# Patient Record
Sex: MALE | Race: WHITE | NOT HISPANIC OR LATINO | Employment: FULL TIME | URBAN - METROPOLITAN AREA
[De-identification: names, ages, dates, MRNs, and addresses within clinical notes are randomized per-mention and may not be internally consistent; named-entity substitution may affect disease eponyms.]

---

## 2017-03-06 DIAGNOSIS — F33.1 MODERATE EPISODE OF RECURRENT MAJOR DEPRESSIVE DISORDER (H): ICD-10-CM

## 2017-03-06 NOTE — TELEPHONE ENCOUNTER
Medication Detail      Disp Refills Start End CRISTELA   buPROPion (WELLBUTRIN SR) 150 MG 12 hr tablet 60 tablet 3 9/29/2016  --   Sig: Take 1 tablet once daily and increase to 1 tablet twice daily after 4 to 7 days   Class: Fax     Last Office Visit with [facility], [facility] or [facility] prescribing provider:  12/22/2016   Next 5 appointments (look out 90 days)     Mar 23, 2017  6:00 PM CDT   SHORT with Clarence Park MD   [facility]    [address] [place] [id number] [phone]                   Last PHQ-9 score on record=   PHQ-9 SCORE 12/22/2016   Total Score 3       No results found for: AST  Lab Results   Component Value Date    ALT 35 05/23/2011

## 2017-03-07 RX ORDER — BUPROPION HYDROCHLORIDE 150 MG/1
TABLET, EXTENDED RELEASE ORAL
Qty: 60 TABLET | Refills: 0 | Status: SHIPPED | OUTPATIENT
Start: 2017-03-07 | End: 2017-03-23

## 2017-03-23 ENCOUNTER — OFFICE VISIT (OUTPATIENT)
Dept: FAMILY MEDICINE | Facility: CLINIC | Age: 47
End: 2017-03-23

## 2017-03-23 VITALS
HEIGHT: 70 IN | SYSTOLIC BLOOD PRESSURE: 122 MMHG | DIASTOLIC BLOOD PRESSURE: 70 MMHG | HEART RATE: 72 BPM | TEMPERATURE: 97.9 F

## 2017-03-23 DIAGNOSIS — G89.21 CHRONIC PAIN DUE TO TRAUMA: ICD-10-CM

## 2017-03-23 DIAGNOSIS — M54.9 BACK PAIN WITH RADIATION: ICD-10-CM

## 2017-03-23 DIAGNOSIS — F33.1 MODERATE EPISODE OF RECURRENT MAJOR DEPRESSIVE DISORDER (H): ICD-10-CM

## 2017-03-23 DIAGNOSIS — M12.579 TRAUMATIC ARTHRITIS OF ANKLE, UNSPECIFIED LATERALITY: ICD-10-CM

## 2017-03-23 DIAGNOSIS — I10 ESSENTIAL HYPERTENSION WITH GOAL BLOOD PRESSURE LESS THAN 140/90: ICD-10-CM

## 2017-03-23 DIAGNOSIS — K21.00 GASTROESOPHAGEAL REFLUX DISEASE WITH ESOPHAGITIS: ICD-10-CM

## 2017-03-23 PROCEDURE — 99213 OFFICE O/P EST LOW 20 MIN: CPT | Performed by: FAMILY MEDICINE

## 2017-03-23 RX ORDER — HYDROCODONE BITARTRATE AND ACETAMINOPHEN 5; 325 MG/1; MG/1
1 TABLET ORAL EVERY 6 HOURS PRN
Qty: 60 TABLET | Refills: 0 | Status: SHIPPED | OUTPATIENT
Start: 2017-05-24 | End: 2017-07-06

## 2017-03-23 RX ORDER — METOPROLOL SUCCINATE 50 MG/1
50 TABLET, EXTENDED RELEASE ORAL DAILY
Qty: 30 TABLET | Refills: 12 | Status: SHIPPED | OUTPATIENT
Start: 2017-03-23 | End: 2018-03-08

## 2017-03-23 RX ORDER — HYDROCODONE BITARTRATE AND ACETAMINOPHEN 5; 325 MG/1; MG/1
1 TABLET ORAL EVERY 6 HOURS PRN
Qty: 60 TABLET | Refills: 0 | Status: SHIPPED | OUTPATIENT
Start: 2017-03-23 | End: 2017-07-06

## 2017-03-23 RX ORDER — BUPROPION HYDROCHLORIDE 150 MG/1
150 TABLET, EXTENDED RELEASE ORAL 2 TIMES DAILY
Qty: 180 TABLET | Refills: 3 | Status: CANCELLED | OUTPATIENT
Start: 2017-03-23

## 2017-03-23 RX ORDER — HYDROCODONE BITARTRATE AND ACETAMINOPHEN 5; 325 MG/1; MG/1
1 TABLET ORAL EVERY 6 HOURS PRN
Qty: 60 TABLET | Refills: 0 | Status: SHIPPED | OUTPATIENT
Start: 2017-04-24 | End: 2017-07-06

## 2017-03-23 NOTE — PATIENT INSTRUCTIONS
-Try taking extra strength tylenol two tabs three times a day. You can take up to 3000 mg a day    -Follow up in three months

## 2017-03-23 NOTE — PROGRESS NOTES
"  SUBJECTIVE:                                                    Rakesh Carey is a 47 year old male who presents to clinic today for the following health issues:    Depression. Patient states that he has started decreasing dose of Wellbutrin. He had started taking Wellbutrin after a \"meltdown\" secondary to several situational stressors. He confirms that he's completely over that period of aguish.     Past/recent records reviewed and discussed for -- medications.      Chronic Pain Follow-Up       Analgesia/pain control:       Recent changes:  same      Overall control: Comfortably manageable  Activity level/function:      Daily activities:  Able to do all daily activities    Work:  Patient is working full time  Adverse effects:  No  Adherance    Taking medication as directed?  Yes    Participating in other treatments: yes  Risk Factors:    Sleep:  Good    Mood/anxiety:  controlled    Recent family or social stressors:  none noted    Other aggravating factors: none  PHQ-9 SCORE 12/17/2015 9/29/2016 12/22/2016   Total Score 1 15 3     THOMAS-7 SCORE 12/17/2015 12/22/2016   Total Score 3 3     Encounter-Level CSA - 03/24/2016:                 Controlled Substance Agreement - Scan on 4/14/2016  5:08 PM : CONTROLLED SUBSTANCE AGREEMENT, 03/24/16 (below)               Controlled Substance Agreement - Scan on 3/25/2016 11:19 AM : CONTROLLED SUBSTANCE AGREEMENT (below)                       Problem list and histories reviewed & adjusted, as indicated.  Additional history: as documented    Labs reviewed in EPIC    Reviewed and updated as needed this visit by clinical staff       Reviewed and updated as needed this visit by Provider         ROS:  Constitutional, HEENT, cardiovascular, pulmonary, GI, , musculoskeletal, neuro, skin, endocrine and psych systems are negative, except as otherwise noted.    This document serves as a record of the services and decisions personally performed and made by Chao Park MD. It was " "created on their behalf by Mauricio Brownlee, a trained medical scribe. The creation of this document is based the provider's statements to the medical scribe.  Mauricio Brownlee March 23, 2017 5:32 PM         OBJECTIVE:                                                    /70 (BP Location: Right arm, Cuff Size: Adult Large)  Pulse 72  Temp 97.9  F (36.6  C) (Oral)  Ht 1.778 m (5' 10\")  There is no height or weight on file to calculate BMI.  GENERAL: healthy, alert and no distress  HENT: ear canals and TM's normal, nose and mouth without ulcers or lesions  RESP: lungs clear to auscultation - no rales, rhonchi or wheezes  CV: regular rate and rhythm, normal S1 S2, no S3 or S4, no murmur, click or rub, no peripheral edema   MS: no gross musculoskeletal defects noted, no edema -- severe arthritis of right ankle  SKIN: no suspicious lesions or rashes  PSYCH: mentation appears normal, affect normal/bright    Diagnostic Test Results:  none      ASSESSMENT/PLAN:                                                    (M12.579) Traumatic arthritis of ankle, right  Comment: stable  Plan: HYDROcodone-acetaminophen (NORCO) 5-325 MG per         tablet, HYDROcodone-acetaminophen (NORCO) 5-325        MG per tablet        Medications refilled, continue present medications    (G89.21) Chronic pain due to trauma  Comment: stable  Plan: HYDROcodone-acetaminophen (NORCO) 5-325 MG per         tablet, HYDROcodone-acetaminophen (NORCO) 5-325        MG per tablet, HYDROcodone-acetaminophen         (NORCO) 5-325 MG per tablet        Continue present medications    (M54.9) Back pain with radiation  Comment: Stable  Plan: HYDROcodone-acetaminophen (NORCO) 5-325 MG per         tablet, HYDROcodone-acetaminophen (NORCO) 5-325        MG per tablet        Continue present medications    (I10) Essential hypertension with goal blood pressure less than 140/90  Comment: controlled  Plan: metoprolol (TOPROL-XL) 50 MG 24 hr tablet        Continue present " medications, medications refilled    (F33.1) Moderate episode of recurrent major depressive disorder (H)  Comment: stable.   Plan: Follow up as needed    (M12.579) Traumatic arthritis of ankle, unspecified laterality  Comment: stable  Plan: HYDROcodone-acetaminophen (NORCO) 5-325 MG per         tablet        Continue present medications, medications refilled    (K21.0) Gastroesophageal reflux disease with esophagitis  Comment: stable  Plan: omeprazole (PRILOSEC) 20 MG CR capsule        Continue present medications, medications refilled    Patient Instructions   -Try taking extra strength tylenol two tabs three times a day. You can take up to 3000 mg a day    -Follow up in three months      Clarence Park MD, MD  [facility]

## 2017-03-23 NOTE — MR AVS SNAPSHOT
After Visit Summary   3/23/2017    Rakesh Carey    MRN: [id number]           Patient Information     Date Of Birth          1970        Visit Information        Provider Department      3/23/2017 6:00 PM Clarence Park MD [facility]        Today's Diagnoses     Traumatic arthritis of ankle, right        Chronic pain due to trauma        Back pain with radiation        Essential hypertension with goal blood pressure less than 140/90        Moderate episode of recurrent major depressive disorder (H)        Traumatic arthritis of ankle, unspecified laterality        Gastroesophageal reflux disease with esophagitis          Care Instructions    -Try taking extra strength tylenol two tabs three times a day. You can take up to 3000 mg a day    -Follow up in three months        Follow-ups after your visit        Your next 10 appointments already scheduled     Mar 23, 2017  6:00 PM CDT   SHORT with Clarence Park MD   [facility]    [address] [place] [id number] [phone]              Who to contact     If you have questions or need follow up information about today's clinic visit or your schedule please contact [facility] directly at [phone].  Normal or non-critical lab and imaging results will be communicated to you by MyChart, letter or phone within 4 business days after the clinic has received the results. If you do not hear from us within 7 days, please contact the clinic through [facility] or phone. If you have a critical or abnormal lab result, we will notify you by phone as soon as possible.  Submit refill requests through [facility] or call your pharmacy and they will forward the refill request to us. Please allow 3 business days for your refill to be completed.          Additional Information About Your Visit        MyChart Information     [facility] lets you send  "messages to your doctor, view your test results, renew your prescriptions, schedule appointments and more. To sign up, go to [email or web address] . Click on \"Log in\" on the left side of the screen, which will take you to the Welcome page. Then click on \"Sign up Now\" on the right side of the page.     You will be asked to enter the access code listed below, as well as some personal information. Please follow the directions to create your username and password.     Your access code is: [email or web address]  Expires: 2017  5:41 PM     Your access code will  in 90 days. If you need help or a new code, please call your [facility] clinic or [phone].        Care EveryWhere ID     This is your Care EveryWhere ID. This could be used by other organizations to access your [facility] medical records  [id number]        Your Vitals Were     Pulse Temperature Height             72 97.9  F (36.6  C) (Oral) 5' 10\" (1.778 m)          Blood Pressure from Last 3 Encounters:   17 122/70   16 122/84   16 126/74    Weight from Last 3 Encounters:   16 275 lb (124.7 kg)   16 278 lb (126.1 kg)   16 285 lb (129.3 kg)              Today, you had the following     No orders found for display         Today's Medication Changes          These changes are accurate as of: 3/23/17  5:41 PM.  If you have any questions, ask your nurse or doctor.               These medicines have changed or have updated prescriptions.        Dose/Directions    * HYDROcodone-acetaminophen 5-325 MG per tablet   Commonly known as:  NORCO   This may have changed:  You were already taking a medication with the same name, and this prescription was added. Make sure you understand how and when to take each.   Used for:  Back pain with radiation, Traumatic arthritis of ankle, unspecified laterality, Chronic pain due to trauma   Changed by:  Clarence Park MD        Dose:  1 tablet   Take 1 tablet by mouth every 6 hours as " needed for pain   Quantity:  60 tablet   Refills:  0       * HYDROcodone-acetaminophen 5-325 MG per tablet   Commonly known as:  NORCO   This may have changed:  Another medication with the same name was added. Make sure you understand how and when to take each.   Used for:  Back pain with radiation, Traumatic arthritis of ankle, unspecified laterality, Chronic pain due to trauma   Changed by:  Clarence Park MD        Dose:  1 tablet   Start taking on:  4/24/2017   Take 1 tablet by mouth every 6 hours as needed for pain   Quantity:  60 tablet   Refills:  0       * HYDROcodone-acetaminophen 5-325 MG per tablet   Commonly known as:  NORCO   This may have changed:  Another medication with the same name was added. Make sure you understand how and when to take each.   Used for:  Traumatic arthritis of ankle, unspecified laterality, Chronic pain due to trauma   Changed by:  Clarence Park MD        Dose:  1 tablet   Start taking on:  5/24/2017   Take 1 tablet by mouth every 6 hours as needed for pain   Quantity:  60 tablet   Refills:  0       * Notice:  This list has 3 medication(s) that are the same as other medications prescribed for you. Read the directions carefully, and ask your doctor or other care provider to review them with you.         Where to get your medicines      These medications were sent to [facility]pharmacy [id number]  [facility] [place] [facility] [address] [address]     Phone:  [phone]     metoprolol 50 MG 24 hr tablet    omeprazole 20 MG CR capsule         Some of these will need a paper prescription and others can be bought over the counter.  Ask your nurse if you have questions.     Bring a paper prescription for each of these medications     HYDROcodone-acetaminophen 5-325 MG per tablet    HYDROcodone-acetaminophen 5-325 MG per tablet    HYDROcodone-acetaminophen 5-325 MG per tablet                Primary Care Provider Office Phone # Fax #    Clarence  Tawanda Park [phone]       [facility] [address] [facility] [address]        Thank you!     Thank you for choosing [facility]  for your care. Our goal is always to provide you with excellent care. Hearing back from our patients is one way we can continue to improve our services. Please take a few minutes to complete the written survey that you may receive in the mail after your visit with us. Thank you!             Your Updated Medication List - Protect others around you: Learn how to safely use, store and throw away your medicines at www.disposemymeds.org.          This list is accurate as of: 3/23/17  5:41 PM.  Always use your most recent med list.                   Brand Name Dispense Instructions for use    cyclobenzaprine 10 MG tablet    FLEXERIL    90 tablet    Take 1 tablet (10 mg) by mouth 3 times daily as needed for muscle spasms       * HYDROcodone-acetaminophen 5-325 MG per tablet    NORCO    60 tablet    Take 1 tablet by mouth every 6 hours as needed for pain       * HYDROcodone-acetaminophen 5-325 MG per tablet   Start taking on:  4/24/2017    NORCO    60 tablet    Take 1 tablet by mouth every 6 hours as needed for pain       * HYDROcodone-acetaminophen 5-325 MG per tablet   Start taking on:  5/24/2017    NORCO    60 tablet    Take 1 tablet by mouth every 6 hours as needed for pain       ibuprofen 800 MG tablet    ADVIL/MOTRIN    100 tablet    Take 1 tablet by mouth 3 times daily as needed for pain       metoprolol 50 MG 24 hr tablet    TOPROL-XL    30 tablet    Take 1 tablet (50 mg) by mouth daily       omeprazole 20 MG CR capsule    priLOSEC    60 capsule    Take 1 capsule (20 mg) by mouth 2 times daily       * Notice:  This list has 3 medication(s) that are the same as other medications prescribed for you. Read the directions carefully, and ask your doctor or other care provider to review them with you.

## 2017-03-23 NOTE — NURSING NOTE
"Chief Complaint   Patient presents with     Pain Management       Initial /70 (BP Location: Right arm, Cuff Size: Adult Large)  Pulse 72  Temp 97.9  F (36.6  C) (Oral)  Ht 5' 10\" (1.778 m) Estimated body mass index is 39.46 kg/(m^2) as calculated from the following:    Height as of 9/8/16: 5' 10\" (1.778 m).    Weight as of 9/8/16: 275 lb (124.7 kg).  Medication Reconciliation: complete     Marcela Gallegos MA     "

## 2017-07-06 ENCOUNTER — OFFICE VISIT (OUTPATIENT)
Dept: FAMILY MEDICINE | Facility: CLINIC | Age: 47
End: 2017-07-06
Payer: COMMERCIAL

## 2017-07-06 VITALS
HEIGHT: 70 IN | HEART RATE: 80 BPM | SYSTOLIC BLOOD PRESSURE: 138 MMHG | WEIGHT: 295 LBS | TEMPERATURE: 98.3 F | BODY MASS INDEX: 42.23 KG/M2 | DIASTOLIC BLOOD PRESSURE: 88 MMHG

## 2017-07-06 DIAGNOSIS — Z00.00 ROUTINE HISTORY AND PHYSICAL EXAMINATION OF ADULT: ICD-10-CM

## 2017-07-06 DIAGNOSIS — L97.521 FOOT ULCER, LEFT, LIMITED TO BREAKDOWN OF SKIN (H): ICD-10-CM

## 2017-07-06 DIAGNOSIS — I10 HYPERTENSION GOAL BP (BLOOD PRESSURE) < 140/90: ICD-10-CM

## 2017-07-06 DIAGNOSIS — E78.5 HYPERLIPIDEMIA WITH TARGET LDL LESS THAN 130: ICD-10-CM

## 2017-07-06 DIAGNOSIS — G89.21 CHRONIC PAIN DUE TO TRAUMA: ICD-10-CM

## 2017-07-06 DIAGNOSIS — M54.9 BACK PAIN WITH RADIATION: ICD-10-CM

## 2017-07-06 DIAGNOSIS — M12.579 TRAUMATIC ARTHRITIS OF ANKLE, UNSPECIFIED LATERALITY: ICD-10-CM

## 2017-07-06 DIAGNOSIS — E66.01 MORBID OBESITY DUE TO EXCESS CALORIES (H): ICD-10-CM

## 2017-07-06 DIAGNOSIS — Z00.01 ENCOUNTER FOR ROUTINE ADULT MEDICAL EXAM WITH ABNORMAL FINDINGS: Primary | ICD-10-CM

## 2017-07-06 PROCEDURE — 99213 OFFICE O/P EST LOW 20 MIN: CPT | Mod: 25 | Performed by: FAMILY MEDICINE

## 2017-07-06 PROCEDURE — 36415 COLL VENOUS BLD VENIPUNCTURE: CPT | Performed by: FAMILY MEDICINE

## 2017-07-06 PROCEDURE — 99396 PREV VISIT EST AGE 40-64: CPT | Performed by: FAMILY MEDICINE

## 2017-07-06 PROCEDURE — 80048 BASIC METABOLIC PNL TOTAL CA: CPT | Performed by: FAMILY MEDICINE

## 2017-07-06 PROCEDURE — 80061 LIPID PANEL: CPT | Performed by: FAMILY MEDICINE

## 2017-07-06 RX ORDER — CEPHALEXIN 500 MG/1
500 CAPSULE ORAL 3 TIMES DAILY
Qty: 30 CAPSULE | Refills: 0 | Status: SHIPPED | OUTPATIENT
Start: 2017-07-06 | End: 2017-09-28

## 2017-07-06 RX ORDER — HYDROCODONE BITARTRATE AND ACETAMINOPHEN 5; 325 MG/1; MG/1
1 TABLET ORAL EVERY 6 HOURS PRN
Qty: 60 TABLET | Refills: 0 | Status: SHIPPED | OUTPATIENT
Start: 2017-07-06 | End: 2017-09-28

## 2017-07-06 RX ORDER — HYDROCODONE BITARTRATE AND ACETAMINOPHEN 5; 325 MG/1; MG/1
1 TABLET ORAL EVERY 6 HOURS PRN
Qty: 60 TABLET | Refills: 0 | Status: SHIPPED | OUTPATIENT
Start: 2017-09-04 | End: 2017-09-28

## 2017-07-06 RX ORDER — HYDROCODONE BITARTRATE AND ACETAMINOPHEN 5; 325 MG/1; MG/1
1 TABLET ORAL EVERY 6 HOURS PRN
Qty: 60 TABLET | Refills: 0 | Status: SHIPPED | OUTPATIENT
Start: 2017-08-05 | End: 2017-09-28

## 2017-07-06 NOTE — LETTER
"[facility]  [address] [place] [id number] [phone]      July 14, 2017      Rakesh Carey  [address]   [facility] [address]          Dear Rakesh,      The results of your recent lab tests showed a normal blood sugar and kidney test. Your cholesterol was mildly elevated. Diet and weight loss is very important.  Lipid goals:    Cholesterol: Desirable is less than 200, Borderline is 200-240  HDL (Good Cholesterol): Desirable is greater than 40  LDL (Bad Cholesterol): Desirable is less than 130, Borderline 130-160  Triglycerides: Desirable is less than 150,  Borderline is 150-400      Ways to improve your cholesterol...    1- Eats less saturated fats (including avoiding \"trans\" fats).    2 - Eat more unsaturated fats  - found in vegetables, grains, and tree nuts.  Also by replacing butter with canola oil or olive oil.    3 - Eat more nuts.  1-2 ounces (a small handful) of almonds, walnuts, hazelnuts or pecans once a day in place of other less healthy snacks.    4 - Eat more high fiber foods - vegetables and whole grains including oat bran, oats, beans, peas, and flax seed.    5 - Eat more fish - such as salmon, tuna, mackerel, and sardines.  1 or 2 six ounce servings per week is a healthy replacement for other proteins.    6 - Exercise for at least 120 minutes per week - which is equal to 30 minutes 4 days per week.       Enclosed is a copy of these results.  If you have any further questions or problems, please contact our office.    Sincerely,    Clarence Park MD    Results for orders placed or performed in visit on 07/06/17   Basic metabolic panel   Result Value Ref Range    Sodium 142 133 - 144 mmol/L    Potassium 3.9 3.4 - 5.3 mmol/L    Chloride 106 94 - 109 mmol/L    Carbon Dioxide 30 20 - 32 mmol/L    Anion Gap 6 3 - 14 mmol/L    Glucose 81 70 - 99 mg/dL    Urea Nitrogen 12 7 - 30 mg/dL    Creatinine 0.88 0.66 - 1.25 mg/dL    GFR Estimate >90  Non  " American GFR Calc   >60 mL/min/1.7m2    GFR Estimate If Black >90   GFR Calc   >60 mL/min/1.7m2    Calcium 9.3 8.5 - 10.1 mg/dL   Lipid panel reflex to direct LDL   Result Value Ref Range    Cholesterol 223 (H) <200 mg/dL    Triglycerides 152 (H) <150 mg/dL    HDL Cholesterol 46 >39 mg/dL    LDL Cholesterol Calculated 147 (H) <100 mg/dL    Non HDL Cholesterol 177 (H) <130 mg/dL

## 2017-07-06 NOTE — PATIENT INSTRUCTIONS
Preventive Health Recommendations  Male Ages 40 to 49    Yearly exam:             See your health care provider every year in order to  o   Review health changes.   o   Discuss preventive care.    o   Review your medicines if your doctor has prescribed any.    You should be tested each year for STDs (sexually transmitted diseases) if you re at risk.     Have a cholesterol test every 5 years.     Have a colonoscopy (test for colon cancer) if someone in your family has had colon cancer or polyps before age 50.     After age 45, have a diabetes test (fasting glucose). If you are at risk for diabetes, you should have this test every 3 years.      Talk with your health care provider about whether or not a prostate cancer screening test (PSA) is right for you.    Shots: Get a flu shot each year. Get a tetanus shot every 10 years.     Nutrition:    Eat at least 5 servings of fruits and vegetables daily.     Eat whole-grain bread, whole-wheat pasta and brown rice instead of white grains and rice.     Talk to your provider about Calcium and Vitamin D.     Lifestyle    Exercise for at least 150 minutes a week (30 minutes a day, 5 days a week). This will help you control your weight and prevent disease.     Limit alcohol to one drink per day.     No smoking.     Wear sunscreen to prevent skin cancer.     See your dentist every six months for an exam and cleaning.                                     Dietary Approaches to Stop Hypertension (The DASH Diet)   What is hypertension?   Hypertension is blood pressure that keeps being higher than normal. Blood pressure is the force of blood against artery walls as the heart pumps blood through the body. Blood pressure can be unhealthy if it is above 120/80. The higher your blood pressure, the greater the health risk.   High blood pressure can be controlled if you take these steps:   Maintain a healthy weight.   Are physically active.   Follow a healthy eating plan, which includes  foods that do not have a lot of salt and sodium.   Do not drink a lot of alcohol.   Diet affects high blood pressure. Following the DASH diet and reducing the amount of sodium in your diet will help lower your blood pressure. It will also help prevent high blood pressure.   What is the DASH diet?   Dietary Approaches to Stop Hypertension (DASH) is a diet that is low in saturated fat, cholesterol, and total fat. It emphasizes fruits, vegetables, and low-fat dairy foods. The DASH diet also includes whole-grain products, fish, poultry, and nuts. It encourages fewer servings of red meat, sweets, and sugar-containing beverages. It is rich in magnesium, potassium, and calcium, as well as protein and fiber.   How do I get started on the DASH diet?   The DASH diet requires no special foods and has no hard-to-follow recipes. Start by seeing how DASH compares with your current eating habits.   The DASH eating plan illustrated below is based on a diet of 2,000 calories a day. Your healthcare provider or a dietitian can help you determine how many calories a day you need. Most adults need somewhere between 1600 and 2800 calories a day. Serving sizes for different foods vary from 1/2 cup to 1 and 1/4 cups. Check product nutrition labels for serving sizes and the number of calories per serving.                      Number of        Examples of  Food Group      servings         serving size  ----------------------------------------------------------------    Grains and      7 to 8 per day   1 slice of bread  grain products                   1 cup ready-to-eat cold cereal                                   1/2 cup cooked rice, pasta,                                   or cereal    Vegetables      4 to 5 per day   1 cup raw leafy vegetable                                   1/2 cup cooked vegetable                                   6 ounces (oz) vegetable juice      Fruits          4 to 5 per day   1 medium fruit                                    1/4 cup dried fruit                                   1/2 cup fresh, frozen, or                                   canned fruit                                   6 oz fruit juice      Low-fat or      2 to 3 per day   8 oz milk  fat-free                         1 cup yogurt  dairy foods                      1 and 1/2 oz cheese    Lean meats,  poultry,        2 or fewer per   3 oz cooked lean meat,  or fish         day              skinless poultry, or fish    Nuts, seeds,    4 to 5 per week  1/3 cup or 1 and 1/2 oz nuts  and dry beans                    1 tablespoon or 1/2 oz seeds                                   1/2 cup cooked dry beans    Fats and oils   2 to 3 per day   1 teaspoon soft margarine                                   1 tablespoon low-fat mayonnaise                                   2 tablespoons light salad                                   dressing                                   1 teaspoon vegetable oil    Sweets          5 per week       1 tablespoon sugar                                   1 tablespoon jelly or jam                                   1/2 oz jelly beans                                   8 oz lemonade  ----------------------------------------------------------------  Make changes gradually. Here are some suggestions that might help:   If you now eat 1 or 2 servings of vegetables a day, add a serving at lunch and another at dinner.   If you have not been eating fruit regularly, or have only juice at breakfast, add a serving to your meals or have it as a snack.   Drink milk or water with lunch or dinner instead of soda, sugar-sweetened tea, or alcohol. Choose low-fat (1%) or fat-free (nonfat) dairy products so that you are eating fewer calories and less saturated fat, total fat, and cholesterol.   Read food labels on margarines and salad dressings to choose products lowest in fat.   If you now eat large portions of meat, slowly cut back--by a half or a third at each meal.  Limit meat to 6 ounces a day (two 3-ounce servings). Three to 4 ounces is about the size of a deck of cards.   Have 2 or more meatless meals each week. Increase servings of vegetables, rice, pasta, and beans in all meals. Try casseroles, pasta, and stir-fontanez dishes, which have less meat and more vegetables, grains, and beans.   Use fruits canned in their own juice. Fresh fruits require little or no preparation. Dried fruits are a good choice to carry with you or to have ready in the car.   Try these snacks ideas: unsalted pretzels or nuts mixed with raisins, sonal crackers, low-fat and fat-free yogurt or frozen yogurt, popcorn with no salt or butter added, and raw vegetables.   Choose whole-grain foods to get more nutrients, including minerals and fiber. For example, choose whole-wheat bread, whole-grain cereals, or brown rice.   Use fresh, frozen, or no-salt-added canned vegetables.   Remember to also reduce the salt and sodium in your diet. Try to have no more than 2000 milligrams (mg) of sodium per day, with a goal of further reducing the sodium to 1500 mg per day. Three important ways to reduce sodium are:   Eat food products with reduced-sodium or no salt added.   Use less salt when you prepare foods and do not add salt to your food at the table.   Read food labels. Aim for foods that contain less than 5% of the daily value of sodium.   The DASH eating plan is not designed for weight loss. But it contains many lower-calorie foods, such as fruits and vegetables. You can make it lower in calories by replacing high-calorie foods with more fruits and vegetables. Some ideas to increase fruits and vegetables and decrease calories include:   Eat a medium apple instead of 4 shortbread cookies. You'll save 80 calories.   Eat 1/4 cup of dried apricots instead of a 2-ounce bag of pork rinds. You'll save 230 calories.   Have a hamburger that's 3 ounces instead of 6 ounces. Add a 1/2 cup serving of carrots and a 1/2 cup  serving of spinach. You'll save more than 200 calories.   Instead of 5 ounces of chicken, have a stir fontanez with 2 ounces of chicken and 1 and 1/2 cups of raw vegetables. Use just a small amount of vegetable oil. You'll save 50 calories.   Have a 1/2 cup serving of low-fat frozen yogurt instead of a 1-and-1/2-ounce chocolate bar. You'll save about 110 calories.   Use low-fat or fat-free condiments, such as fat-free salad dressings.   Eat smaller portions. Cut back gradually.   Use food labels to compare fat and calorie content in packaged foods. Items marked low fat or fat free may be lower in fat but not lower in calories than their regular versions.   Limit foods with lots of added sugar, such as pies, flavored yogurts, candy bars, ice cream, sherbet, regular soft drinks, and fruit drinks.   Drink water or club soda instead of cola or other soda drinks.     For more information, see the National Heart, Lung, and [employer] [place] Web site at: http://www.nhlbi.nih.gov/health/public/heart/hbp/dash/.   [facility]   Discharged by : Lorena LERMA CMA ([facility])    Paper scripts provided to patient : [employer] (3)   If you have any questions regarding to your visit please contact your care team:   [facility] Hours Telephone Number   DORENE Gil Dr., PA-C    7am-7pm Monday - Thursday   7am-5pm Fridays  [phone]   (Appointment scheduling available 24/7)   RN Line   [phone] option 2       What options do I have for visits at the clinic other than the traditional office visit?   To expand how we care for you, many of our providers are utilizing electronic visits (e-visits) and telephone visits, when medically appropriate, for interactions with their patients rather than a visit in the clinic. We also offer nurse visits for many medical concerns. Just like any other service, we will bill your insurance company for this type of visit based on time  spent on the phone with your provider. Not all insurance companies cover these visits. Please check with your medical insurance if this type of visit is covered. You will be responsible for any charges that are not paid by your insurance.     E-visits via [employer]: generally incur a $35.00 fee.     Telephone visits:   Time spent on the phone: *charged based on time that is spent on the phone in increments of 10 minutes. Estimated cost:   5-10 mins $30.00   11-20 mins. $59.00   21-30 mins. $85.00   Use [employer] (secure email communication and access to your chart) to send your primary care provider a message or make an appointment. Ask someone on your Team how to sign up for [employer].   For a Price Quote for your services, please call our Consumer Price Line at [phone].   As always, Thank you for trusting us with your health care needs!                [facility]:    Scheduling Appointments  [place]  Call: [phone]    [place] [facility] [facility]  Call: [phone]    [facility]  Call: [phone]

## 2017-07-06 NOTE — PROGRESS NOTES
SUBJECTIVE:   CC: Rakesh Carey is an 47 year old male who presents for preventative health visit.     Healthy Habits:    Do you get at least three servings of calcium containing foods daily (dairy, green leafy vegetables, etc.)? yes    Amount of exercise or daily activities, outside of work: 5-7 day(s) per week    Problems taking medications regularly No    Medication side effects: No    Have you had an eye exam in the past two years? no    Do you see a dentist twice per year? yes    Do you have sleep apnea, excessive snoring or daytime drowsiness?no    Wife had been diagnosed with a fatty liver consequently adopting a very grueling no-carbohydrate diet. He's planning on imitating her dietary habits.     Skin changes. He notes new development of lesion on his foot about a month ago. Patient has been using neosporin with some improvement, but notes occasional purulent drainage. He thinks it was irritation from steel toe boot.       Today's PHQ-2 Score:   PHQ-2 ( 1999 [employer]) 7/6/2017 6/30/2016   Q1: Little interest or pleasure in doing things 0 0   Q2: Feeling down, depressed or hopeless 0 0   PHQ-2 Score 0 0       Abuse: Current or Past(Physical, Sexual or Emotional)- No  Do you feel safe in your environment - Yes    Social History   Substance Use Topics     Smoking status: Former Smoker     Packs/day: 0.25     Years: 20.00     Types: Cigarettes     Quit date: 5/14/2013     Smokeless tobacco: Never Used      Comment: <1/2 ppd     Alcohol use Yes      Comment: 5 glasses of wine on weekend     The patient does not drink >3 drinks per day nor >7 drinks per week.    Last PSA: No results found for: PSA    Reviewed orders with patient. Reviewed health maintenance and updated orders accordingly - Yes  Labs reviewed in EPIC    Reviewed and updated as needed this visit by clinical staff  Tobacco  Allergies  Meds  Med Hx  Surg Hx  Fam Hx  Soc Hx        Reviewed and updated as needed this visit by Provider       "      ROS:  C: NEGATIVE for fever, chills, change in weight  I: NEGATIVE for worrisome moles. Positive for left foot lesion.   E: NEGATIVE for vision changes or irritation  ENT: NEGATIVE for ear, mouth and throat problems  R: NEGATIVE for significant cough or SOB  CV: NEGATIVE for chest pain, palpitations or peripheral edema  GI: NEGATIVE for nausea, abdominal pain, heartburn, or change in bowel habits   male: negative for dysuria, hematuria, decreased urinary stream, erectile dysfunction, urethral discharge  M: NEGATIVE for significant arthralgias or myalgia  N: NEGATIVE for weakness, dizziness or paresthesias  P: NEGATIVE for changes in mood or affect    This document serves as a record of the services and decisions personally performed and made by Chao Park MD. It was created on their behalf by Mauricio Brownlee, a trained medical scribe. The creation of this document is based the provider's statements to the medical scribe.  Mauricio Brownlee July 6, 2017 6:20 PM         OBJECTIVE:   /88  Pulse 80  Temp 98.3  F (36.8  C) (Oral)  Ht 1.778 m (5' 10\")  Wt 133.8 kg (295 lb)  BMI 42.33 kg/m2  EXAM:  GENERAL: overweight, alert and no distress  EYES: Eyes grossly normal to inspection, PERRL and conjunctivae and sclerae normal  HENT: ear canals and TM's normal, nose and mouth without ulcers or lesions  NECK: no adenopathy, no asymmetry, masses, or scars and thyroid normal to palpation  RESP: lungs clear to auscultation - no rales, rhonchi or wheezes  CV: regular rate and rhythm, normal S1 S2, no S3 or S4, no murmur, click or rub, no peripheral edema and peripheral pulses strong  ABDOMEN: soft, nontender, no hepatosplenomegaly, no masses and bowel sounds normal  MS: no gross musculoskeletal defects noted, no edema  SKIN: 3-4 mm ulceration at the 4th MTP joint with minimal purulent discharge  NEURO: Normal strength and tone, mentation intact and speech normal  PSYCH: mentation appears normal, affect " normal/bright    ASSESSMENT/PLAN:   (Z00.01) Encounter for routine adult medical exam with abnormal findings  (primary encounter diagnosis)  Comment: Morbidly obese based on BMI. Gave counseling on achieving positive behavioral eating habits and portion size control. Patient committed to incorporating a more robust diet. Outline of DASH diet was provided which could inversely improve his blood pressure.   Plan: Basic metabolic panel, Lipid panel reflex to         direct LDL        -follow up, pending results        -lifestyle modifications    (I10) Hypertension goal BP (blood pressure) < 140/90  Comment: see above  Plan: Basic metabolic panel, Lipid panel reflex to         direct LDL        See above    (E66.01) Overweigiht BMI>40  Comment:   Plan: wife has lost weight with low carb. Diet. He is planning to start. Advised follow up 3 months for recheck    (E78.5) Hyperlipidemia with target LDL less than 130  Comment: stable from previous workups  Plan: Lipid panel reflex to direct LDL        -follow up, pending results    (M12.579) Traumatic arthritis of ankle, right  Comment: stable  Plan: HYDROcodone-acetaminophen (NORCO) 5-325 MG per         tablet, HYDROcodone-acetaminophen (NORCO) 5-325        MG per tablet        Medications refilled, continue present medications    (G89.21) Chronic pain due to trauma  Comment:   Plan: HYDROcodone-acetaminophen (NORCO) 5-325 MG per         tablet, HYDROcodone-acetaminophen (NORCO) 5-325        MG per tablet, HYDROcodone-acetaminophen         (NORCO) 5-325 MG per tablet            (M54.9) Back pain with radiation  Comment: chronic  Plan: HYDROcodone-acetaminophen (NORCO) 5-325 MG per         tablet, HYDROcodone-acetaminophen (NORCO) 5-325        MG per tablet        -medications refilled, continue present medications    (M12.579) Traumatic arthritis of ankle, unspecified laterality  Comment:   Plan: HYDROcodone-acetaminophen (NORCO) 5-325 MG per         tablet             (D27.52) Foot ulcer, left, limited to breakdown of skin (H)  Comment: 3-4 mm ulceration at the 4th MTP joint with minimal purulent discharge  Plan: cephALEXin (KEFLEX) 500 MG capsule        -follow up if not improved    Patient Instructions       Preventive Health Recommendations  Male Ages 40 to 49    Yearly exam:             See your health care provider every year in order to  o   Review health changes.   o   Discuss preventive care.    o   Review your medicines if your doctor has prescribed any.    You should be tested each year for STDs (sexually transmitted diseases) if you re at risk.     Have a cholesterol test every 5 years.     Have a colonoscopy (test for colon cancer) if someone in your family has had colon cancer or polyps before age 50.     After age 45, have a diabetes test (fasting glucose). If you are at risk for diabetes, you should have this test every 3 years.      Talk with your health care provider about whether or not a prostate cancer screening test (PSA) is right for you.    Shots: Get a flu shot each year. Get a tetanus shot every 10 years.     Nutrition:    Eat at least 5 servings of fruits and vegetables daily.     Eat whole-grain bread, whole-wheat pasta and brown rice instead of white grains and rice.     Talk to your provider about Calcium and Vitamin D.     Lifestyle    Exercise for at least 150 minutes a week (30 minutes a day, 5 days a week). This will help you control your weight and prevent disease.     Limit alcohol to one drink per day.     No smoking.     Wear sunscreen to prevent skin cancer.     See your dentist every six months for an exam and cleaning.                                     Dietary Approaches to Stop Hypertension (The DASH Diet)   What is hypertension?   Hypertension is blood pressure that keeps being higher than normal. Blood pressure is the force of blood against artery walls as the heart pumps blood through the body. Blood pressure can be unhealthy if  it is above 120/80. The higher your blood pressure, the greater the health risk.   High blood pressure can be controlled if you take these steps:   Maintain a healthy weight.   Are physically active.   Follow a healthy eating plan, which includes foods that do not have a lot of salt and sodium.   Do not drink a lot of alcohol.   Diet affects high blood pressure. Following the DASH diet and reducing the amount of sodium in your diet will help lower your blood pressure. It will also help prevent high blood pressure.   What is the DASH diet?   Dietary Approaches to Stop Hypertension (DASH) is a diet that is low in saturated fat, cholesterol, and total fat. It emphasizes fruits, vegetables, and low-fat dairy foods. The DASH diet also includes whole-grain products, fish, poultry, and nuts. It encourages fewer servings of red meat, sweets, and sugar-containing beverages. It is rich in magnesium, potassium, and calcium, as well as protein and fiber.   How do I get started on the DASH diet?   The DASH diet requires no special foods and has no hard-to-follow recipes. Start by seeing how DASH compares with your current eating habits.   The DASH eating plan illustrated below is based on a diet of 2,000 calories a day. Your healthcare provider or a dietitian can help you determine how many calories a day you need. Most adults need somewhere between 1600 and 2800 calories a day. Serving sizes for different foods vary from 1/2 cup to 1 and 1/4 cups. Check product nutrition labels for serving sizes and the number of calories per serving.                      Number of        Examples of  Food Group      servings         serving size  ----------------------------------------------------------------    Grains and      7 to 8 per day   1 slice of bread  grain products                   1 cup ready-to-eat cold cereal                                   1/2 cup cooked rice, pasta,                                   or cereal    Vegetables       4 to 5 per day   1 cup raw leafy vegetable                                   1/2 cup cooked vegetable                                   6 ounces (oz) vegetable juice      Fruits          4 to 5 per day   1 medium fruit                                   1/4 cup dried fruit                                   1/2 cup fresh, frozen, or                                   canned fruit                                   6 oz fruit juice      Low-fat or      2 to 3 per day   8 oz milk  fat-free                         1 cup yogurt  dairy foods                      1 and 1/2 oz cheese    Lean meats,  poultry,        2 or fewer per   3 oz cooked lean meat,  or fish         day              skinless poultry, or fish    Nuts, seeds,    4 to 5 per week  1/3 cup or 1 and 1/2 oz nuts  and dry beans                    1 tablespoon or 1/2 oz seeds                                   1/2 cup cooked dry beans    Fats and oils   2 to 3 per day   1 teaspoon soft margarine                                   1 tablespoon low-fat mayonnaise                                   2 tablespoons light salad                                   dressing                                   1 teaspoon vegetable oil    Sweets          5 per week       1 tablespoon sugar                                   1 tablespoon jelly or jam                                   1/2 oz jelly beans                                   8 oz lemonade  ----------------------------------------------------------------  Make changes gradually. Here are some suggestions that might help:   If you now eat 1 or 2 servings of vegetables a day, add a serving at lunch and another at dinner.   If you have not been eating fruit regularly, or have only juice at breakfast, add a serving to your meals or have it as a snack.   Drink milk or water with lunch or dinner instead of soda, sugar-sweetened tea, or alcohol. Choose low-fat (1%) or fat-free (nonfat) dairy products so that you are eating  fewer calories and less saturated fat, total fat, and cholesterol.   Read food labels on margarines and salad dressings to choose products lowest in fat.   If you now eat large portions of meat, slowly cut back--by a half or a third at each meal. Limit meat to 6 ounces a day (two 3-ounce servings). Three to 4 ounces is about the size of a deck of cards.   Have 2 or more meatless meals each week. Increase servings of vegetables, rice, pasta, and beans in all meals. Try casseroles, pasta, and stir-fontanez dishes, which have less meat and more vegetables, grains, and beans.   Use fruits canned in their own juice. Fresh fruits require little or no preparation. Dried fruits are a good choice to carry with you or to have ready in the car.   Try these snacks ideas: unsalted pretzels or nuts mixed with raisins, sonal crackers, low-fat and fat-free yogurt or frozen yogurt, popcorn with no salt or butter added, and raw vegetables.   Choose whole-grain foods to get more nutrients, including minerals and fiber. For example, choose whole-wheat bread, whole-grain cereals, or brown rice.   Use fresh, frozen, or no-salt-added canned vegetables.   Remember to also reduce the salt and sodium in your diet. Try to have no more than 2000 milligrams (mg) of sodium per day, with a goal of further reducing the sodium to 1500 mg per day. Three important ways to reduce sodium are:   Eat food products with reduced-sodium or no salt added.   Use less salt when you prepare foods and do not add salt to your food at the table.   Read food labels. Aim for foods that contain less than 5% of the daily value of sodium.   The DASH eating plan is not designed for weight loss. But it contains many lower-calorie foods, such as fruits and vegetables. You can make it lower in calories by replacing high-calorie foods with more fruits and vegetables. Some ideas to increase fruits and vegetables and decrease calories include:   Eat a medium apple instead of 4  shortbread cookies. You'll save 80 calories.   Eat 1/4 cup of dried apricots instead of a 2-ounce bag of pork rinds. You'll save 230 calories.   Have a hamburger that's 3 ounces instead of 6 ounces. Add a 1/2 cup serving of carrots and a 1/2 cup serving of spinach. You'll save more than 200 calories.   Instead of 5 ounces of chicken, have a stir fontanez with 2 ounces of chicken and 1 and 1/2 cups of raw vegetables. Use just a small amount of vegetable oil. You'll save 50 calories.   Have a 1/2 cup serving of low-fat frozen yogurt instead of a 1-and-1/2-ounce chocolate bar. You'll save about 110 calories.   Use low-fat or fat-free condiments, such as fat-free salad dressings.   Eat smaller portions. Cut back gradually.   Use food labels to compare fat and calorie content in packaged foods. Items marked low fat or fat free may be lower in fat but not lower in calories than their regular versions.   Limit foods with lots of added sugar, such as pies, flavored yogurts, candy bars, ice cream, sherbet, regular soft drinks, and fruit drinks.   Drink water or club soda instead of cola or other soda drinks.     For more information, see the National Heart, Lung, and Blood [place] Web site at: http://www.nhlbi.nih.gov/health/public/heart/hbp/dash/.   [facility]   Discharged by : Lorena LERMA CMA ([facility])    Paper scripts provided to patient : Norco (3)   If you have any questions regarding to your visit please contact your care team:   [facility] Hours Telephone Number   DORENE Gil Dr., PA-C    7am-7pm Monday - Thursday   7am-5pm Fridays  [phone]   (Appointment scheduling available 24/7)   RN Line   [phone] option 2       What options do I have for visits at the clinic other than the traditional office visit?   To expand how we care for you, many of our providers are utilizing electronic visits (e-visits) and telephone visits, when  "medically appropriate, for interactions with their patients rather than a visit in the clinic. We also offer nurse visits for many medical concerns. Just like any other service, we will bill your insurance company for this type of visit based on time spent on the phone with your provider. Not all insurance companies cover these visits. Please check with your medical insurance if this type of visit is covered. You will be responsible for any charges that are not paid by your insurance.     E-visits via [employer]: generally incur a $35.00 fee.     Telephone visits:   Time spent on the phone: *charged based on time that is spent on the phone in increments of 10 minutes. Estimated cost:   5-10 mins $30.00   11-20 mins. $59.00   21-30 mins. $85.00   Use e(ye)BRAIN (secure email communication and access to your chart) to send your primary care provider a message or make an appointment. Ask someone on your Team how to sign up for e(ye)BRAIN.   For a Price Quote for your services, please call our Consumer Price Line at [phone].   As always, Thank you for trusting us with your health care needs!                [facility]:    Scheduling Appointments  [facility]  Call: [phone]    [facility]  Call: [phone]    [facility]  Call: [phone]                      COUNSELING:  Reviewed preventive health counseling, as reflected in patient instructions       Regular exercise       Healthy diet/nutrition         reports that he quit smoking about 4 years ago. His smoking use included Cigarettes. He has a 5.00 pack-year smoking history. He has never used smokeless tobacco.    Estimated body mass index is 42.33 kg/(m^2) as calculated from the following:    Height as of this encounter: 5' 10\" (1.778 m).    Weight as of this encounter: 295 lb (133.8 kg).   Weight management plan: Discussed healthy diet and exercise guidelines and " patient will follow up in 12 months in clinic to re-evaluate.    Counseling Resources:  ATP IV Guidelines  Pooled Cohorts Equation Calculator  FRAX Risk Assessment  ICSI Preventive Guidelines  Dietary Guidelines for Americans, 2010  USDA's MyPlate  ASA Prophylaxis  Lung CA Screening    The information in this document, created by the medical scribe for me, accurately reflects the services I personally performed and the decisions made by me. I have reviewed and approved this document for accuracy prior to leaving the patient care area.    Clarence Park MD, MD  [facility]

## 2017-07-06 NOTE — NURSING NOTE
"Chief Complaint   Patient presents with     Physical       Initial /88  Pulse 80  Temp 98.3  F (36.8  C) (Oral)  Ht 5' 10\" (1.778 m)  Wt 295 lb (133.8 kg)  BMI 42.33 kg/m2 Estimated body mass index is 42.33 kg/(m^2) as calculated from the following:    Height as of this encounter: 5' 10\" (1.778 m).    Weight as of this encounter: 295 lb (133.8 kg).  Medication Reconciliation: complete    "

## 2017-07-06 NOTE — MR AVS SNAPSHOT
After Visit Summary   7/6/2017    Rakesh Carey    MRN: [id number]           Patient Information     Date Of Birth          1970        Visit Information        Provider Department      7/6/2017 6:00 PM Clarence Prak MD [facility]        Today's Diagnoses     Routine history and physical examination of adult    -  1    Hypertension goal BP (blood pressure) < 140/90        Overweigiht BMI>40        Hyperlipidemia with target LDL less than 130        Traumatic arthritis of ankle, right        Chronic pain due to trauma        Back pain with radiation        Traumatic arthritis of ankle, unspecified laterality        Foot ulcer, left, limited to breakdown of skin (H)          Care Instructions      Preventive Health Recommendations  Male Ages 40 to 49    Yearly exam:             See your health care provider every year in order to  o   Review health changes.   o   Discuss preventive care.    o   Review your medicines if your doctor has prescribed any.    You should be tested each year for STDs (sexually transmitted diseases) if you re at risk.     Have a cholesterol test every 5 years.     Have a colonoscopy (test for colon cancer) if someone in your family has had colon cancer or polyps before age 50.     After age 45, have a diabetes test (fasting glucose). If you are at risk for diabetes, you should have this test every 3 years.      Talk with your health care provider about whether or not a prostate cancer screening test (PSA) is right for you.    Shots: Get a flu shot each year. Get a tetanus shot every 10 years.     Nutrition:    Eat at least 5 servings of fruits and vegetables daily.     Eat whole-grain bread, whole-wheat pasta and brown rice instead of white grains and rice.     Talk to your provider about Calcium and Vitamin D.     Lifestyle    Exercise for at least 150 minutes a week (30 minutes a day, 5 days a week). This will help you control your weight and  prevent disease.     Limit alcohol to one drink per day.     No smoking.     Wear sunscreen to prevent skin cancer.     See your dentist every six months for an exam and cleaning.                                     Dietary Approaches to Stop Hypertension (The DASH Diet)   What is hypertension?   Hypertension is blood pressure that keeps being higher than normal. Blood pressure is the force of blood against artery walls as the heart pumps blood through the body. Blood pressure can be unhealthy if it is above 120/80. The higher your blood pressure, the greater the health risk.   High blood pressure can be controlled if you take these steps:   Maintain a healthy weight.   Are physically active.   Follow a healthy eating plan, which includes foods that do not have a lot of salt and sodium.   Do not drink a lot of alcohol.   Diet affects high blood pressure. Following the DASH diet and reducing the amount of sodium in your diet will help lower your blood pressure. It will also help prevent high blood pressure.   What is the DASH diet?   Dietary Approaches to Stop Hypertension (DASH) is a diet that is low in saturated fat, cholesterol, and total fat. It emphasizes fruits, vegetables, and low-fat dairy foods. The DASH diet also includes whole-grain products, fish, poultry, and nuts. It encourages fewer servings of red meat, sweets, and sugar-containing beverages. It is rich in magnesium, potassium, and calcium, as well as protein and fiber.   How do I get started on the DASH diet?   The DASH diet requires no special foods and has no hard-to-follow recipes. Start by seeing how DASH compares with your current eating habits.   The DASH eating plan illustrated below is based on a diet of 2,000 calories a day. Your healthcare provider or a dietitian can help you determine how many calories a day you need. Most adults need somewhere between 1600 and 2800 calories a day. Serving sizes for different foods vary from 1/2 cup to 1  and 1/4 cups. Check product nutrition labels for serving sizes and the number of calories per serving.                      Number of        Examples of  Food Group      servings         serving size  ----------------------------------------------------------------    Grains and      7 to 8 per day   1 slice of bread  grain products                   1 cup ready-to-eat cold cereal                                   1/2 cup cooked rice, pasta,                                   or cereal    Vegetables      4 to 5 per day   1 cup raw leafy vegetable                                   1/2 cup cooked vegetable                                   6 ounces (oz) vegetable juice      Fruits          4 to 5 per day   1 medium fruit                                   1/4 cup dried fruit                                   1/2 cup fresh, frozen, or                                   canned fruit                                   6 oz fruit juice      Low-fat or      2 to 3 per day   8 oz milk  fat-free                         1 cup yogurt  dairy foods                      1 and 1/2 oz cheese    Lean meats,  poultry,        2 or fewer per   3 oz cooked lean meat,  or fish         day              skinless poultry, or fish    Nuts, seeds,    4 to 5 per week  1/3 cup or 1 and 1/2 oz nuts  and dry beans                    1 tablespoon or 1/2 oz seeds                                   1/2 cup cooked dry beans    Fats and oils   2 to 3 per day   1 teaspoon soft margarine                                   1 tablespoon low-fat mayonnaise                                   2 tablespoons light salad                                   dressing                                   1 teaspoon vegetable oil    Sweets          5 per week       1 tablespoon sugar                                   1 tablespoon jelly or jam                                   1/2 oz jelly beans                                   8 oz  lemonade  ----------------------------------------------------------------  Make changes gradually. Here are some suggestions that might help:   If you now eat 1 or 2 servings of vegetables a day, add a serving at lunch and another at dinner.   If you have not been eating fruit regularly, or have only juice at breakfast, add a serving to your meals or have it as a snack.   Drink milk or water with lunch or dinner instead of soda, sugar-sweetened tea, or alcohol. Choose low-fat (1%) or fat-free (nonfat) dairy products so that you are eating fewer calories and less saturated fat, total fat, and cholesterol.   Read food labels on margarines and salad dressings to choose products lowest in fat.   If you now eat large portions of meat, slowly cut back--by a half or a third at each meal. Limit meat to 6 ounces a day (two 3-ounce servings). Three to 4 ounces is about the size of a deck of cards.   Have 2 or more meatless meals each week. Increase servings of vegetables, rice, pasta, and beans in all meals. Try casseroles, pasta, and stir-fontanez dishes, which have less meat and more vegetables, grains, and beans.   Use fruits canned in their own juice. Fresh fruits require little or no preparation. Dried fruits are a good choice to carry with you or to have ready in the car.   Try these snacks ideas: unsalted pretzels or nuts mixed with raisins, sonal crackers, low-fat and fat-free yogurt or frozen yogurt, popcorn with no salt or butter added, and raw vegetables.   Choose whole-grain foods to get more nutrients, including minerals and fiber. For example, choose whole-wheat bread, whole-grain cereals, or brown rice.   Use fresh, frozen, or no-salt-added canned vegetables.   Remember to also reduce the salt and sodium in your diet. Try to have no more than 2000 milligrams (mg) of sodium per day, with a goal of further reducing the sodium to 1500 mg per day. Three important ways to reduce sodium are:   Eat food products with  reduced-sodium or no salt added.   Use less salt when you prepare foods and do not add salt to your food at the table.   Read food labels. Aim for foods that contain less than 5% of the daily value of sodium.   The DASH eating plan is not designed for weight loss. But it contains many lower-calorie foods, such as fruits and vegetables. You can make it lower in calories by replacing high-calorie foods with more fruits and vegetables. Some ideas to increase fruits and vegetables and decrease calories include:   Eat a medium apple instead of 4 shortbread cookies. You'll save 80 calories.   Eat 1/4 cup of dried apricots instead of a 2-ounce bag of pork rinds. You'll save 230 calories.   Have a hamburger that's 3 ounces instead of 6 ounces. Add a 1/2 cup serving of carrots and a 1/2 cup serving of spinach. You'll save more than 200 calories.   Instead of 5 ounces of chicken, have a stir fontanez with 2 ounces of chicken and 1 and 1/2 cups of raw vegetables. Use just a small amount of vegetable oil. You'll save 50 calories.   Have a 1/2 cup serving of low-fat frozen yogurt instead of a 1-and-1/2-ounce chocolate bar. You'll save about 110 calories.   Use low-fat or fat-free condiments, such as fat-free salad dressings.   Eat smaller portions. Cut back gradually.   Use food labels to compare fat and calorie content in packaged foods. Items marked low fat or fat free may be lower in fat but not lower in calories than their regular versions.   Limit foods with lots of added sugar, such as pies, flavored yogurts, candy bars, ice cream, sherbet, regular soft drinks, and fruit drinks.   Drink water or club soda instead of cola or other soda drinks.     For more information, see the National Heart, Lung, and Blood [place] Web site at: http://www.nhlbi.nih.gov/health/public/heart/hbp/dash/.   [facility]   Discharged by : Lorena LERMA CMA ([facility])    Paper scripts provided to patient : Norco (3)   If you have any  questions regarding to your visit please contact your care team:   [facility] Hours Telephone Number   DORENE Gil Dr., PA-C    7am-7pm Monday - Thursday   7am-5pm Fridays  [phone]   (Appointment scheduling available 24/7)   RN Line   [phone] option 2       What options do I have for visits at the clinic other than the traditional office visit?   To expand how we care for you, many of our providers are utilizing electronic visits (e-visits) and telephone visits, when medically appropriate, for interactions with their patients rather than a visit in the clinic. We also offer nurse visits for many medical concerns. Just like any other service, we will bill your insurance company for this type of visit based on time spent on the phone with your provider. Not all insurance companies cover these visits. Please check with your medical insurance if this type of visit is covered. You will be responsible for any charges that are not paid by your insurance.     E-visits via fromAtoB: generally incur a $35.00 fee.     Telephone visits:   Time spent on the phone: *charged based on time that is spent on the phone in increments of 10 minutes. Estimated cost:   5-10 mins $30.00   11-20 mins. $59.00   21-30 mins. $85.00   Use Go800t (secure email communication and access to your chart) to send your primary care provider a message or make an appointment. Ask someone on your Team how to sign up for fromAtoB.   For a Price Quote for your services, please call our Consumer Price Line at [phone].   As always, Thank you for trusting us with your health care needs!                [facility]:    Scheduling Appointments  [facility]  Call: [phone]    [facility]  Call: [phone]    [facility]  Call: [phone]                    Follow-ups after your visit    "     Who to contact     If you have questions or need follow up information about today's clinic visit or your schedule please contact [facility] directly at [phone].  Normal or non-critical lab and imaging results will be communicated to you by MyChart, letter or phone within 4 business days after the clinic has received the results. If you do not hear from us within 7 days, please contact the clinic through MyChart or phone. If you have a critical or abnormal lab result, we will notify you by phone as soon as possible.  Submit refill requests through Tamago or call your pharmacy and they will forward the refill request to us. Please allow 3 business days for your refill to be completed.          Additional Information About Your Visit        [facility] Information     Tamago lets you send messages to your doctor, view your test results, renew your prescriptions, schedule appointments and more. To sign up, go to [email or web address] . Click on \"Log in\" on the left side of the screen, which will take you to the Welcome page. Then click on \"Sign up Now\" on the right side of the page.     You will be asked to enter the access code listed below, as well as some personal information. Please follow the directions to create your username and password.     Your access code is: [email or web address]  Expires: 10/4/2017  6:24 PM     Your access code will  in 90 days. If you need help or a new code, please call your [place] clinic or [phone].        Care EveryWhere ID     This is your Care EveryWhere ID. This could be used by other organizations to access your [place] medical records  [id number]        Your Vitals Were     Pulse Temperature Height BMI (Body Mass Index)          80 98.3  F (36.8  C) (Oral) 5' 10\" (1.778 m) 42.33 kg/m2         Blood Pressure from Last 3 Encounters:   17 138/88   17 122/70   16 122/84    Weight from Last 3 Encounters:   17 295 lb (133.8 kg) "   09/08/16 275 lb (124.7 kg)   06/30/16 278 lb (126.1 kg)              We Performed the Following     Basic metabolic panel     Lipid panel reflex to direct LDL          Today's Medication Changes          These changes are accurate as of: 7/6/17  6:24 PM.  If you have any questions, ask your nurse or doctor.               Start taking these medicines.        Dose/Directions    cephALEXin 500 MG capsule   Commonly known as:  KEFLEX   Used for:  Foot ulcer, left, limited to breakdown of skin (H)   Started by:  Clarence Park MD        Dose:  500 mg   Take 1 capsule (500 mg) by mouth 3 times daily   Quantity:  30 capsule   Refills:  0            Where to get your medicines      These medications were sent to [facility]/pharmacy #[id number] - [place], [place] - [address]  [address] [address]     Phone:  [phone]     cephALEXin 500 MG capsule         Some of these will need a paper prescription and others can be bought over the counter.  Ask your nurse if you have questions.     Bring a paper prescription for each of these medications     HYDROcodone-acetaminophen 5-325 MG per tablet    HYDROcodone-acetaminophen 5-325 MG per tablet    HYDROcodone-acetaminophen 5-325 MG per tablet                Primary Care Provider Office Phone # Fax #    Clarence Park [phone]       [address] [address]        Equal Access to Services     JASMIN ZEE AH: Hadjacob nava hadasho Somikeali, waaxda luqadaha, qaybta kaalmada tom, humza trujillo. So [facility] [phone].    ATENCIÓN: Si habla español, tiene a lovett disposición servicios gratuitos de asistencia lingüística. Hawa al [phone].    We comply with applicable federal civil rights laws and [place] laws. We do not discriminate on the basis of race, color, national origin, age, disability sex, sexual orientation or gender identity.             Thank you!     Thank you for choosing [facility]  for your care. Our goal is always to provide you with excellent care. Hearing back from our patients is one way we can continue to improve our services. Please take a few minutes to complete the written survey that you may receive in the mail after your visit with us. Thank you!             Your Updated Medication List - Protect others around you: Learn how to safely use, store and throw away your medicines at www.disposemymeds.org.          This list is accurate as of: 7/6/17  6:24 PM.  Always use your most recent med list.                   Brand Name Dispense Instructions for use Diagnosis    cephALEXin 500 MG capsule    KEFLEX    30 capsule    Take 1 capsule (500 mg) by mouth 3 times daily    Foot ulcer, left, limited to breakdown of skin (H)       cyclobenzaprine 10 MG tablet    FLEXERIL    90 tablet    Take 1 tablet (10 mg) by mouth 3 times daily as needed for muscle spasms    Back pain with radiation       * HYDROcodone-acetaminophen 5-325 MG per tablet    NORCO    60 tablet    Take 1 tablet by mouth every 6 hours as needed for pain    Back pain with radiation, Traumatic arthritis of ankle, unspecified laterality, Chronic pain due to trauma       * HYDROcodone-acetaminophen 5-325 MG per tablet   Start taking on:  8/5/2017    NORCO    60 tablet    Take 1 tablet by mouth every 6 hours as needed for pain    Back pain with radiation, Traumatic arthritis of ankle, unspecified laterality, Chronic pain due to trauma       * HYDROcodone-acetaminophen 5-325 MG per tablet   Start taking on:  9/4/2017    NORCO    60 tablet    Take 1 tablet by mouth every 6 hours as needed for pain    Traumatic arthritis of ankle, unspecified laterality, Chronic pain due to trauma       ibuprofen 800 MG tablet    ADVIL/MOTRIN    100 tablet    Take 1 tablet by mouth 3 times daily as needed for pain    Traumatic arthritis of ankle, right       metoprolol 50 MG 24 hr  tablet    TOPROL-XL    30 tablet    Take 1 tablet (50 mg) by mouth daily    Essential hypertension with goal blood pressure less than 140/90       omeprazole 20 MG CR capsule    priLOSEC    60 capsule    Take 1 capsule (20 mg) by mouth 2 times daily    Gastroesophageal reflux disease with esophagitis       * Notice:  This list has 3 medication(s) that are the same as other medications prescribed for you. Read the directions carefully, and ask your doctor or other care provider to review them with you.

## 2017-07-07 LAB
ANION GAP SERPL CALCULATED.3IONS-SCNC: 6 MMOL/L (ref 3–14)
BUN SERPL-MCNC: 12 MG/DL (ref 7–30)
CALCIUM SERPL-MCNC: 9.3 MG/DL (ref 8.5–10.1)
CHLORIDE SERPL-SCNC: 106 MMOL/L (ref 94–109)
CHOLEST SERPL-MCNC: 223 MG/DL
CO2 SERPL-SCNC: 30 MMOL/L (ref 20–32)
CREAT SERPL-MCNC: 0.88 MG/DL (ref 0.66–1.25)
GFR SERPL CREATININE-BSD FRML MDRD: NORMAL ML/MIN/1.7M2
GLUCOSE SERPL-MCNC: 81 MG/DL (ref 70–99)
HDLC SERPL-MCNC: 46 MG/DL
LDLC SERPL CALC-MCNC: 147 MG/DL
NONHDLC SERPL-MCNC: 177 MG/DL
POTASSIUM SERPL-SCNC: 3.9 MMOL/L (ref 3.4–5.3)
SODIUM SERPL-SCNC: 142 MMOL/L (ref 133–144)
TRIGL SERPL-MCNC: 152 MG/DL

## 2017-07-18 ENCOUNTER — TELEPHONE (OUTPATIENT)
Dept: FAMILY MEDICINE | Facility: CLINIC | Age: 47
End: 2017-07-18

## 2017-07-18 DIAGNOSIS — L02.619 CELLULITIS AND ABSCESS OF FOOT EXCLUDING TOE: Primary | ICD-10-CM

## 2017-07-18 DIAGNOSIS — L03.119 CELLULITIS AND ABSCESS OF FOOT EXCLUDING TOE: Primary | ICD-10-CM

## 2017-07-18 RX ORDER — SULFAMETHOXAZOLE/TRIMETHOPRIM 800-160 MG
1 TABLET ORAL 2 TIMES DAILY
Qty: 14 TABLET | Refills: 0 | Status: SHIPPED | OUTPATIENT
Start: 2017-07-18 | End: 2017-09-28

## 2017-07-18 NOTE — TELEPHONE ENCOUNTER
We can change antibiotic to one that covers resistent bacteria but if not improved he needs to be seen again and I would recommend see podiatry. He could see Dr Moura here on a Wednesday but since he lives in [place] he could see someone in the [facility].     Order pended for Bactrim. Ok to send when pharmacy clarified  Order placed for podiatry. Give referral number to patient.    : G: [facility] [phone]   [email or web address]  FMG: [facility] [phone]   [email or web address]      Chao Park MD

## 2017-07-18 NOTE — TELEPHONE ENCOUNTER
Attempt # 1    Called patient at home number.     Was call answered?  No.  Left message on voicemail with information to call me back.    Gianni Lan RN

## 2017-07-18 NOTE — TELEPHONE ENCOUNTER
Called and gave patient message below, he verbalized understanding. Order signed. Gave referral numbers.    Gianni Lan RN

## 2017-07-18 NOTE — TELEPHONE ENCOUNTER
Reason for call:  Patient reporting a symptom    Symptom or request: open sore on top of foot/ recently saw Dr Park and he had prescribed antibiotic for him and he has finished the medication but the sore is still there.  Patient works 2 jobs so it is difficult to come back in.  Patient is wondering if Dr Park can prescribe something else for him.    Duration (how long have symptoms been present): about 3 months    Have you been treated for this before? No    Additional comments:     Phone Number patient can be reached at:  Home number on file [phone] (home)    Best Time:  any    Can we leave a detailed message on this number:  YES    Call taken on 7/18/2017 at 9:22 AM by Starr Richard

## 2017-07-18 NOTE — TELEPHONE ENCOUNTER
Called and spoke with wife as patient is not available. The sore is the same-he's been putting on an antibiotic gel cream on it and covering it. Small amount of discharge that's he's always had-not oozing. It was getting better the first 5 days on antibiotics, but then went back to the way it was when he saw Dr. Park. No increased redness, swelling, discharge, fever etc. Please advise.    Gianni Lan RN

## 2017-07-19 DIAGNOSIS — K21.00 GASTROESOPHAGEAL REFLUX DISEASE WITH ESOPHAGITIS: ICD-10-CM

## 2017-07-19 NOTE — TELEPHONE ENCOUNTER
Patient had a years worth of refills in March. Called and left  with wife to return call.    Gianni Lan RN

## 2017-07-19 NOTE — TELEPHONE ENCOUNTER
Medication Detail      Disp Refills Start End CRISTELA   omeprazole (PRILOSEC) 20 MG CR capsule 60 capsule 12 3/23/2017  No   Sig: Take 1 capsule (20 mg) by mouth 2 times daily   Class: E-Prescribe   Route: Oral   Order: [id number]        Last Office Visit with FMMAGDALENO, VALERIO or [facility] prescribing provider: 7/6/2017

## 2017-07-21 DIAGNOSIS — K21.00 GASTROESOPHAGEAL REFLUX DISEASE WITH ESOPHAGITIS: ICD-10-CM

## 2017-07-21 NOTE — TELEPHONE ENCOUNTER
pharmacy note: WAS NOT RECEIVED . THE ONLY ONE WE HAVE WAS WRITTEN IN 06-16      omeprazole (PRILOSEC) 20 MG CR capsule   20 mg, 2 TIMES DAILY 12 ordered  Reorder     Summary: Take 1 capsule (20 mg) by mouth 2 times daily, Disp-60 capsule, R-12, E-Prescribe   Dose, Route, Frequency: 20 mg, Oral, 2 TIMES DAILY  Start: 3/23/2017  Ord/Sold: 3/23/2017 (O)  Report  Taking:   Long-term:   Pharmacy: [facility]/pharmacy #[id number] - [place], [place] - [address] Dose History  ChangeDiscontinue       Patient Sig: Take 1 capsule (20 mg) by mouth 2 times daily       Ordered on: 3/23/2017       Authorized by: RADU GARNETT       Dispense: 60 capsule          Last Office Visit with FMMAGDALENO, KLARISSAP or [facility] prescribing provider: 7-6-2017

## 2017-07-24 NOTE — TELEPHONE ENCOUNTER
Prescription refill for omeprazole approved for 9 mos. The RX was sent in March 2017 but [facility] reports they never received it. The # of refills was adjusted to reflect when Rakesh should be seen in clinic again with Dr Park.   Yeimy Moura RN  Triage  [facility]  [phone]

## 2017-07-31 ENCOUNTER — OFFICE VISIT (OUTPATIENT)
Dept: PODIATRY | Facility: CLINIC | Age: 47
End: 2017-07-31
Payer: COMMERCIAL

## 2017-07-31 VITALS
SYSTOLIC BLOOD PRESSURE: 122 MMHG | WEIGHT: 295 LBS | BODY MASS INDEX: 42.23 KG/M2 | HEIGHT: 70 IN | DIASTOLIC BLOOD PRESSURE: 68 MMHG

## 2017-07-31 DIAGNOSIS — B35.3 TINEA PEDIS OF LEFT FOOT: Primary | ICD-10-CM

## 2017-07-31 DIAGNOSIS — L03.116 CELLULITIS OF FOOT, LEFT: ICD-10-CM

## 2017-07-31 PROCEDURE — 99203 OFFICE O/P NEW LOW 30 MIN: CPT | Performed by: PODIATRIST

## 2017-07-31 NOTE — NURSING NOTE
"Chief Complaint   Patient presents with     Foot Problems     swelling and redness of left foot, PCP has tried anticiotics to treat, with out much relief        Initial /68  Ht 5' 10\" (1.778 m)  Wt 295 lb (133.8 kg)  BMI 42.33 kg/m2 Estimated body mass index is 42.33 kg/(m^2) as calculated from the following:    Height as of this encounter: 5' 10\" (1.778 m).    Weight as of this encounter: 295 lb (133.8 kg).  Medication Reconciliation: complete   Nica Hays MA      "

## 2017-07-31 NOTE — MR AVS SNAPSHOT
"              After Visit Summary   2017    Rakesh Carey    MRN: [id number]           Patient Information     Date Of Birth          1970        Visit Information        Provider Department      2017 4:00 PM Elian Borja DPM [facility]        Today's Diagnoses     Tinea pedis of left foot    -  1    Cellulitis of foot, left           Follow-ups after your visit        Who to contact     If you have questions or need follow up information about today's clinic visit or your schedule please contact [facility] directly at [phone].  Normal or non-critical lab and imaging results will be communicated to you by Qvolvehart, letter or phone within 4 business days after the clinic has received the results. If you do not hear from us within 7 days, please contact the clinic through Qvolvehart or phone. If you have a critical or abnormal lab result, we will notify you by phone as soon as possible.  Submit refill requests through Fluorofinder or call your pharmacy and they will forward the refill request to us. Please allow 3 business days for your refill to be completed.          Additional Information About Your Visit        MyChart Information     Fluorofinder lets you send messages to your doctor, view your test results, renew your prescriptions, schedule appointments and more. To sign up, go to [email or web address] . Click on \"Log in\" on the left side of the screen, which will take you to the Welcome page. Then click on \"Sign up Now\" on the right side of the page.     You will be asked to enter the access code listed below, as well as some personal information. Please follow the directions to create your username and password.     Your access code is: [email or web address]  Expires: 10/4/2017  6:24 PM     Your access code will  in 90 days. If you need help or a new code, please call your [facility] or [phone].        Care EveryWhere ID     This is " "your Care EveryWhere ID. This could be used by other organizations to access your [place] medical records  [id number]        Your Vitals Were     Height BMI (Body Mass Index)                5' 10\" (1.778 m) 42.33 kg/m2           Blood Pressure from Last 3 Encounters:   07/31/17 122/68   07/06/17 138/88   03/23/17 122/70    Weight from Last 3 Encounters:   07/31/17 295 lb (133.8 kg)   07/06/17 295 lb (133.8 kg)   09/08/16 275 lb (124.7 kg)              Today, you had the following     No orders found for display       Primary Care Provider Office Phone # Fax #    Clarence Park [phone]       [address] [address]        Equal Access to Services     JASMIN ZEE : Hadii aad brandy hadasho Soomaali, waaxda luqadaha, qaybta kaalmada adeegyada, humza andersonin hayaaabe carias . So [facility] [phone].    ATENCIÓN: Si habla español, tiene a lovett disposición servicios gratuitos de asistencia lingüística. Hawa al [phone].    We comply with applicable federal civil rights laws and [place] laws. We do not discriminate on the basis of race, color, national origin, age, disability sex, sexual orientation or gender identity.            Thank you!     Thank you for choosing [facility]  for your care. Our goal is always to provide you with excellent care. Hearing back from our patients is one way we can continue to improve our services. Please take a few minutes to complete the written survey that you may receive in the mail after your visit with us. Thank you!             Your Updated Medication List - Protect others around you: Learn how to safely use, store and throw away your medicines at www.disposemymeds.org.          This list is accurate as of: 7/31/17  4:34 PM.  Always use your most recent med list.                   Brand Name Dispense Instructions for use Diagnosis    cephALEXin 500 MG capsule    KEFLEX    " 30 capsule    Take 1 capsule (500 mg) by mouth 3 times daily    Foot ulcer, left, limited to breakdown of skin (H)       cyclobenzaprine 10 MG tablet    FLEXERIL    90 tablet    Take 1 tablet (10 mg) by mouth 3 times daily as needed for muscle spasms    Back pain with radiation       * HYDROcodone-acetaminophen 5-325 MG per tablet    NORCO    60 tablet    Take 1 tablet by mouth every 6 hours as needed for pain    Back pain with radiation, Traumatic arthritis of ankle, unspecified laterality, Chronic pain due to trauma       * HYDROcodone-acetaminophen 5-325 MG per tablet   Start taking on:  8/5/2017    NORCO    60 tablet    Take 1 tablet by mouth every 6 hours as needed for pain    Back pain with radiation, Traumatic arthritis of ankle, unspecified laterality, Chronic pain due to trauma       * HYDROcodone-acetaminophen 5-325 MG per tablet   Start taking on:  9/4/2017    NORCO    60 tablet    Take 1 tablet by mouth every 6 hours as needed for pain    Traumatic arthritis of ankle, unspecified laterality, Chronic pain due to trauma       ibuprofen 800 MG tablet    ADVIL/MOTRIN    100 tablet    Take 1 tablet by mouth 3 times daily as needed for pain    Traumatic arthritis of ankle, right       metoprolol 50 MG 24 hr tablet    TOPROL-XL    30 tablet    Take 1 tablet (50 mg) by mouth daily    Essential hypertension with goal blood pressure less than 140/90       * omeprazole 20 MG CR capsule    priLOSEC    60 capsule    Take 1 capsule (20 mg) by mouth 2 times daily    Gastroesophageal reflux disease with esophagitis       * omeprazole 20 MG CR capsule    priLOSEC    60 capsule    TAKE 1 CAPSULE (20 MG) BY MOUTH 2 TIMES DAILY    Gastroesophageal reflux disease with esophagitis       sulfamethoxazole-trimethoprim 800-160 MG per tablet    BACTRIM DS/SEPTRA DS    14 tablet    Take 1 tablet by mouth 2 times daily    Cellulitis and abscess of foot excluding toe       * Notice:  This list has 5 medication(s) that are the same  as other medications prescribed for you. Read the directions carefully, and ask your doctor or other care provider to review them with you.
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
## 2017-08-22 ENCOUNTER — TELEPHONE (OUTPATIENT)
Dept: NURSING | Facility: CLINIC | Age: 47
End: 2017-08-22

## 2017-08-22 NOTE — TELEPHONE ENCOUNTER
Patients wife was transferred to me.    Spoke with wife, Patient is having surgery at [facility], they need all of his past records sent on.    Wife states she will have  stop in at the [facility] location and have him fill out JESSICA, then fax to Medical Records directly.    Yessenia Tabares,

## 2017-08-22 NOTE — TELEPHONE ENCOUNTER
Reason for Call:  Wife of the patient called at stated that she talked to someone that told her they would fax the patient a release form and he never got it i'm not sure if she was talking to records but she stated that she called the clinic and she is very up set that someone told her the form would be faxed and It wasn't, she would like Call back as soon as possible.     Phone Number Patient can be reached at: Home number on file [phone] (home)    Best Time: any    Can we leave a detailed message on this number? YES    Call taken on 8/22/2017 at 1:23 PM by Naty Hammond

## 2017-09-28 ENCOUNTER — OFFICE VISIT (OUTPATIENT)
Dept: FAMILY MEDICINE | Facility: CLINIC | Age: 47
End: 2017-09-28
Payer: COMMERCIAL

## 2017-09-28 VITALS
HEIGHT: 70 IN | TEMPERATURE: 98.2 F | WEIGHT: 293.5 LBS | DIASTOLIC BLOOD PRESSURE: 76 MMHG | HEART RATE: 72 BPM | SYSTOLIC BLOOD PRESSURE: 126 MMHG | BODY MASS INDEX: 42.02 KG/M2

## 2017-09-28 DIAGNOSIS — M12.579 TRAUMATIC ARTHRITIS OF ANKLE, UNSPECIFIED LATERALITY: ICD-10-CM

## 2017-09-28 DIAGNOSIS — M54.9 BACK PAIN WITH RADIATION: ICD-10-CM

## 2017-09-28 DIAGNOSIS — G89.21 CHRONIC PAIN DUE TO TRAUMA: ICD-10-CM

## 2017-09-28 PROCEDURE — 99213 OFFICE O/P EST LOW 20 MIN: CPT | Performed by: FAMILY MEDICINE

## 2017-09-28 RX ORDER — HYDROCODONE BITARTRATE AND ACETAMINOPHEN 5; 325 MG/1; MG/1
1 TABLET ORAL EVERY 6 HOURS PRN
Qty: 60 TABLET | Refills: 0 | Status: SHIPPED | OUTPATIENT
Start: 2017-10-29 | End: 2017-12-07

## 2017-09-28 RX ORDER — HYDROCODONE BITARTRATE AND ACETAMINOPHEN 5; 325 MG/1; MG/1
1 TABLET ORAL EVERY 6 HOURS PRN
Qty: 60 TABLET | Refills: 0 | Status: SHIPPED | OUTPATIENT
Start: 2017-09-28 | End: 2017-12-07

## 2017-09-28 RX ORDER — HYDROCODONE BITARTRATE AND ACETAMINOPHEN 5; 325 MG/1; MG/1
1 TABLET ORAL EVERY 6 HOURS PRN
Qty: 60 TABLET | Refills: 0 | Status: SHIPPED | OUTPATIENT
Start: 2017-11-29 | End: 2017-12-07

## 2017-09-28 NOTE — NURSING NOTE
Handed patient 3 printed out prescription for:    3 for:  HYDROcodone-acetaminophen (NORCO) 5-325 MG per tablet    Ginger Aguilar CMA

## 2017-09-28 NOTE — PROGRESS NOTES
SUBJECTIVE:   Rakesh Carey is a 47 year old male who presents to clinic today for the following health issues:      Patient presents to the clinic for med refills.     Problem list and histories reviewed & adjusted, as indicated.  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 No family hx of      DIABETES No family hx of      Hypertension No family hx of      CEREBROVASCULAR DISEASE No family hx of      Breast Cancer No family hx of          Current Outpatient Prescriptions   Medication Sig Dispense Refill     omeprazole (PRILOSEC) 20 MG CR capsule TAKE 1 CAPSULE (20 MG) BY MOUTH 2 TIMES DAILY 60 capsule 9     HYDROcodone-acetaminophen (NORCO) 5-325 MG per tablet Take 1 tablet by mouth every 6 hours as needed for pain 60 tablet 0     metoprolol (TOPROL-XL) 50 MG 24 hr tablet Take 1 tablet (50 mg) by mouth daily 30 tablet 12     ibuprofen (ADVIL/MOTRIN) 800 MG tablet Take 1 tablet by mouth 3 times daily as needed for pain 100 tablet 1     cyclobenzaprine (FLEXERIL) 10 MG tablet  Take 1 tablet (10 mg) by mouth 3 times daily as needed for muscle spasms 90 tablet 3     HYDROcodone-acetaminophen (NORCO) 5-325 MG per tablet Take 1 tablet by mouth every 6 hours as needed for pain 60 tablet 0     HYDROcodone-acetaminophen (NORCO) 5-325 MG per tablet Take 1 tablet by mouth every 6 hours as needed for pain 60 tablet 0     omeprazole (PRILOSEC) 20 MG CR capsule Take 1 capsule (20 mg) by mouth 2 times daily 60 capsule 12     No Known Allergies  Recent Labs   Lab Test  07/06/17   1826  06/30/16   1833  06/25/15   1833   05/23/11   1738  05/07/10   0723   A1C   --    --    --    --   5.6  5.7   LDL  147*  149*  127   < >  131*  154*   HDL  46  42  37*   < >  47  29*   TRIG  152*  78  96   < >  285*  131   ALT   --    --    --    --   35   --    CR  0.88  0.73  0.77   < >  0.92   --    GFRESTIMATED  >90  Non  GFR Calc    >90  Non  GFR Calc    >90  Non  GFR Calc     < >  >90   --    GFRESTBLACK  >90   GFR Calc    >90   GFR Calc    >90   GFR Calc     < >  >90   --    POTASSIUM  3.9  4.0  4.2   < >  4.0   --    TSH   --    --    --    --   2.67  1.21    < > = values in this interval not displayed.      BP Readings from Last 3 Encounters:   09/28/17 126/76   07/31/17 122/68   07/06/17 138/88    Wt Readings from Last 3 Encounters:   09/28/17 133.1 kg (293 lb 8 oz)   07/31/17 133.8 kg (295 lb)   07/06/17 133.8 kg (295 lb)                  Labs reviewed in EPIC          Reviewed and updated as needed this visit by clinical staff       Reviewed and updated as needed this visit by Provider         ROS:  Constitutional, HEENT, cardiovascular, pulmonary, GI, , musculoskeletal, neuro, skin, endocrine and psych systems are negative, except as otherwise noted.    This document serves as a record of the services and decisions personally performed and made by Chao Park MD. It was created on their behalf by jarvis Downey  "trained medical scribe. The creation of this document is based the provider's statements to the medical scribe.  Mauricio Brownlee September 28, 2017 5:11 PM         OBJECTIVE:   /76 (BP Location: Right arm, Cuff Size: Adult Large)  Pulse 72  Temp 98.2  F (36.8  C) (Oral)  Ht 1.778 m (5' 10\")  Wt 133.1 kg (293 lb 8 oz)  BMI 42.11 kg/m2  Body mass index is 42.11 kg/(m^2).  GENERAL: healthy, alert and no distress  HENT: ear canals and TM's normal, nose and mouth without ulcers or lesions  NECK: no adenopathy, no asymmetry, masses, or scars and thyroid normal to palpation  RESP: lungs clear to auscultation - no rales, rhonchi or wheezes  CV: regular rate and rhythm, normal S1 S2, no S3 or S4, no murmur, click or rub  MS: no gross musculoskeletal defects noted, no edema -- right ankle: wearing braces  SKIN: no suspicious lesions or rashes  PSYCH: mentation appears normal, affect normal/bright    Weight has decreased seven pounds    Diagnostic Test Results:  none     ASSESSMENT/PLAN:   (M12.579) Traumatic arthritis of ankle, right  Comment: controlled. Saw orthopedist, Dr. Darling. Planning on potential surgery in a year.   Plan: HYDROcodone-acetaminophen (NORCO) 5-325 MG per         tablet, HYDROcodone-acetaminophen (NORCO) 5-325        MG per tablet        -medications refilled, continue present medications    (G89.21) Chronic pain due to trauma  Comment: controlled  Plan: HYDROcodone-acetaminophen (NORCO) 5-325 MG per         tablet, HYDROcodone-acetaminophen (NORCO) 5-325        MG per tablet, HYDROcodone-acetaminophen         (NORCO) 5-325 MG per tablet            (M54.9) Back pain with radiation  Comment: controlled  Plan: HYDROcodone-acetaminophen (NORCO) 5-325 MG per         tablet, HYDROcodone-acetaminophen (NORCO) 5-325        MG per tablet            (M12.579) Traumatic arthritis of ankle, unspecified laterality  Comment: controlled  Plan: HYDROcodone-acetaminophen (NORCO) 5-325 MG per         tablet        "     Patient Instructions     Orders Placed This Encounter     HYDROcodone-acetaminophen (NORCO) 5-325 MG per tablet     Sig: Take 1 tablet by mouth every 6 hours as needed for pain     Dispense:  60 tablet     Refill:  0     HYDROcodone-acetaminophen (NORCO) 5-325 MG per tablet     Sig: Take 1 tablet by mouth every 6 hours as needed for pain     Dispense:  60 tablet     Refill:  0     HYDROcodone-acetaminophen (NORCO) 5-325 MG per tablet     Sig: Take 1 tablet by mouth every 6 hours as needed for pain     Dispense:  60 tablet     Refill:  0               The information in this document, created by the medical scribe for me, accurately reflects the services I personally performed and the decisions made by me. I have reviewed and approved this document for accuracy prior to leaving the patient care area.    Clarence Park MD, MD  [facility]

## 2017-09-28 NOTE — MR AVS SNAPSHOT
After Visit Summary   9/28/2017    Rakesh Carey    MRN: [id number]           Patient Information     Date Of Birth          1970        Visit Information        Provider Department      9/28/2017 5:20 PM Clarence Park MD [facility]        Today's Diagnoses     Traumatic arthritis of ankle, right        Chronic pain due to trauma        Back pain with radiation        Traumatic arthritis of ankle, unspecified laterality          Care Instructions    Orders Placed This Encounter     HYDROcodone-acetaminophen (NORCO) 5-325 MG per tablet     Sig: Take 1 tablet by mouth every 6 hours as needed for pain     Dispense:  60 tablet     Refill:  0     HYDROcodone-acetaminophen (NORCO) 5-325 MG per tablet     Sig: Take 1 tablet by mouth every 6 hours as needed for pain     Dispense:  60 tablet     Refill:  0     HYDROcodone-acetaminophen (NORCO) 5-325 MG per tablet     Sig: Take 1 tablet by mouth every 6 hours as needed for pain     Dispense:  60 tablet     Refill:  0               Follow-ups after your visit        Your next 10 appointments already scheduled     Sep 28, 2017  5:20 PM CDT   SHORT with Clarence Park MD   [facility] ([facility])    [address] [place] [id number] [phone]              Who to contact     If you have questions or need follow up information about today's clinic visit or your schedule please contact [facility] directly at [phone].  Normal or non-critical lab and imaging results will be communicated to you by MyChart, letter or phone within 4 business days after the clinic has received the results. If you do not hear from us within 7 days, please contact the clinic through MyChart or phone. If you have a critical or abnormal lab result, we will notify you by phone as soon as possible.  Submit refill requests through [employer] or call your pharmacy and they will  "forward the refill request to us. Please allow 3 business days for your refill to be completed.          Additional Information About Your Visit        [place] [employer] Information     [employer] lets you send messages to your doctor, view your test results, renew your prescriptions, schedule appointments and more. To sign up, go to [email or web address] . Click on \"Log in\" on the left side of the screen, which will take you to the Welcome page. Then click on \"Sign up Now\" on the right side of the page.     You will be asked to enter the access code listed below, as well as some personal information. Please follow the directions to create your username and password.     Your access code is: [email or web address]  Expires: 10/4/2017  6:24 PM     Your access code will  in 90 days. If you need help or a new code, please call your [place] clinic or [phone].        Care EveryWhere ID     This is your Care EveryWhere ID. This could be used by other organizations to access your [place] medical records  [id number]        Your Vitals Were     Pulse Temperature Height BMI (Body Mass Index)          72 98.2  F (36.8  C) (Oral) 5' 10\" (1.778 m) 42.11 kg/m2         Blood Pressure from Last 3 Encounters:   17 126/76   17 122/68   17 138/88    Weight from Last 3 Encounters:   17 293 lb 8 oz (133.1 kg)   17 295 lb (133.8 kg)   17 295 lb (133.8 kg)              Today, you had the following     No orders found for display         Where to get your medicines      Some of these will need a paper prescription and others can be bought over the counter.  Ask your nurse if you have questions.     Bring a paper prescription for each of these medications     HYDROcodone-acetaminophen 5-325 MG per tablet    HYDROcodone-acetaminophen 5-325 MG per tablet    HYDROcodone-acetaminophen 5-325 MG per tablet          Primary Care Provider Office Phone # Fax #    Clarence Park [phone]       " [address] [facility] [address]        Equal Access to Services     JASMIN ZEE : Hadii aad ku hadfilipedaisy Sotona, walacyda luqadaha, qaybta kamargeoleg santos, humza mckinneycarsonalmas trujillo. So [facility] [phone].    ATENCIÓN: Si habla español, tiene a lovett disposición servicios gratuitos de asistencia lingüística. Llame al [phone].    We comply with applicable federal civil rights laws and [place] laws. We do not discriminate on the basis of race, color, national origin, age, disability sex, sexual orientation or gender identity.            Thank you!     Thank you for choosing [facility]  for your care. Our goal is always to provide you with excellent care. Hearing back from our patients is one way we can continue to improve our services. Please take a few minutes to complete the written survey that you may receive in the mail after your visit with us. Thank you!             Your Updated Medication List - Protect others around you: Learn how to safely use, store and throw away your medicines at www.disposemymeds.org.          This list is accurate as of: 9/28/17  5:16 PM.  Always use your most recent med list.                   Brand Name Dispense Instructions for use Diagnosis    cyclobenzaprine 10 MG tablet    FLEXERIL    90 tablet    Take 1 tablet (10 mg) by mouth 3 times daily as needed for muscle spasms    Back pain with radiation       * HYDROcodone-acetaminophen 5-325 MG per tablet    NORCO    60 tablet    Take 1 tablet by mouth every 6 hours as needed for pain    Back pain with radiation, Traumatic arthritis of ankle, unspecified laterality, Chronic pain due to trauma       * HYDROcodone-acetaminophen 5-325 MG per tablet   Start taking on:  10/29/2017    NORCO    60 tablet    Take 1 tablet by mouth every 6 hours as needed for pain    Back pain with radiation, Traumatic arthritis of ankle, unspecified laterality, Chronic pain due to trauma       *  HYDROcodone-acetaminophen 5-325 MG per tablet   Start taking on:  11/29/2017    NORCO    60 tablet    Take 1 tablet by mouth every 6 hours as needed for pain    Traumatic arthritis of ankle, unspecified laterality, Chronic pain due to trauma       ibuprofen 800 MG tablet    ADVIL/MOTRIN    100 tablet    Take 1 tablet by mouth 3 times daily as needed for pain    Traumatic arthritis of ankle, right       metoprolol 50 MG 24 hr tablet    TOPROL-XL    30 tablet    Take 1 tablet (50 mg) by mouth daily    Essential hypertension with goal blood pressure less than 140/90       * omeprazole 20 MG CR capsule    priLOSEC    60 capsule    Take 1 capsule (20 mg) by mouth 2 times daily    Gastroesophageal reflux disease with esophagitis       * omeprazole 20 MG CR capsule    priLOSEC    60 capsule    TAKE 1 CAPSULE (20 MG) BY MOUTH 2 TIMES DAILY    Gastroesophageal reflux disease with esophagitis       * Notice:  This list has 5 medication(s) that are the same as other medications prescribed for you. Read the directions carefully, and ask your doctor or other care provider to review them with you.

## 2017-09-28 NOTE — NURSING NOTE
"Chief Complaint   Patient presents with     Ankle Trauma     follow up on right ankle pain       Initial There were no vitals taken for this visit. Estimated body mass index is 42.33 kg/(m^2) as calculated from the following:    Height as of 7/31/17: 5' 10\" (1.778 m).    Weight as of 7/31/17: 295 lb (133.8 kg).  Medication Reconciliation: complete   Ginger Aguilar CMA      "

## 2017-11-17 DIAGNOSIS — M12.571 TRAUMATIC ARTHRITIS OF ANKLE, RIGHT: ICD-10-CM

## 2017-11-22 RX ORDER — IBUPROFEN 800 MG/1
TABLET, FILM COATED ORAL
Qty: 100 TABLET | Refills: 6 | Status: SHIPPED | OUTPATIENT
Start: 2017-11-22 | End: 2018-03-08

## 2017-11-22 NOTE — TELEPHONE ENCOUNTER
Routing refill request to provider for review/approval because: 800mg Ibuprofen  Dose not in protocol do you wish to refill?  Cleo Tinsley,Clinic Rn  [facility]bell

## 2017-12-07 ENCOUNTER — OFFICE VISIT (OUTPATIENT)
Dept: FAMILY MEDICINE | Facility: CLINIC | Age: 47
End: 2017-12-07
Payer: COMMERCIAL

## 2017-12-07 VITALS
DIASTOLIC BLOOD PRESSURE: 82 MMHG | TEMPERATURE: 98.2 F | HEART RATE: 76 BPM | HEIGHT: 70 IN | WEIGHT: 296 LBS | SYSTOLIC BLOOD PRESSURE: 142 MMHG | BODY MASS INDEX: 42.37 KG/M2

## 2017-12-07 DIAGNOSIS — M12.579 TRAUMATIC ARTHRITIS OF ANKLE, UNSPECIFIED LATERALITY: ICD-10-CM

## 2017-12-07 DIAGNOSIS — G89.21 CHRONIC PAIN DUE TO TRAUMA: ICD-10-CM

## 2017-12-07 DIAGNOSIS — B00.1 RECURRENT COLD SORES: Primary | ICD-10-CM

## 2017-12-07 DIAGNOSIS — M54.9 BACK PAIN WITH RADIATION: ICD-10-CM

## 2017-12-07 PROCEDURE — 99213 OFFICE O/P EST LOW 20 MIN: CPT | Performed by: FAMILY MEDICINE

## 2017-12-07 RX ORDER — HYDROCODONE BITARTRATE AND ACETAMINOPHEN 5; 325 MG/1; MG/1
1 TABLET ORAL EVERY 6 HOURS PRN
Qty: 60 TABLET | Refills: 0 | Status: SHIPPED | OUTPATIENT
Start: 2018-02-28 | End: 2018-03-08

## 2017-12-07 RX ORDER — ACYCLOVIR 400 MG/1
400 TABLET ORAL 3 TIMES DAILY
Qty: 30 TABLET | Refills: 5 | Status: SHIPPED | OUTPATIENT
Start: 2017-12-07 | End: 2019-06-27

## 2017-12-07 RX ORDER — HYDROCODONE BITARTRATE AND ACETAMINOPHEN 5; 325 MG/1; MG/1
1 TABLET ORAL EVERY 6 HOURS PRN
Qty: 60 TABLET | Refills: 0 | Status: SHIPPED | OUTPATIENT
Start: 2018-01-28 | End: 2018-03-08

## 2017-12-07 RX ORDER — HYDROCODONE BITARTRATE AND ACETAMINOPHEN 5; 325 MG/1; MG/1
1 TABLET ORAL EVERY 6 HOURS PRN
Qty: 60 TABLET | Refills: 0 | Status: SHIPPED | OUTPATIENT
Start: 2017-12-29 | End: 2018-03-08

## 2017-12-07 NOTE — MR AVS SNAPSHOT
After Visit Summary   12/7/2017    Rakesh Carey    MRN: [id number]           Patient Information     Date Of Birth          1970        Visit Information        Provider Department      12/7/2017 6:20 PM Clarence Park MD [facility]        Today's Diagnoses     Recurrent cold sores    -  1    Traumatic arthritis of ankle, right        Chronic pain due to trauma        Back pain with radiation        Traumatic arthritis of ankle, unspecified laterality          Care Instructions    Orders Placed This Encounter     HYDROcodone-acetaminophen (NORCO) 5-325 MG per tablet     Sig: Take 1 tablet by mouth every 6 hours as needed for pain     Dispense:  60 tablet     Refill:  0     HYDROcodone-acetaminophen (NORCO) 5-325 MG per tablet     Sig: Take 1 tablet by mouth every 6 hours as needed for pain     Dispense:  60 tablet     Refill:  0     HYDROcodone-acetaminophen (NORCO) 5-325 MG per tablet     Sig: Take 1 tablet by mouth every 6 hours as needed for pain     Dispense:  60 tablet     Refill:  0         acyclovir (ZOVIRAX) 400 MG tablet     Sig: Take 1 tablet (400 mg) by mouth 3 times daily     Dispense:  30 tablet     Refill:  5               Follow-ups after your visit        Who to contact     If you have questions or need follow up information about today's clinic visit or your schedule please contact [facility] directly at [phone].  Normal or non-critical lab and imaging results will be communicated to you by MyChart, letter or phone within 4 business days after the clinic has received the results. If you do not hear from us within 7 days, please contact the clinic through MyChart or phone. If you have a critical or abnormal lab result, we will notify you by phone as soon as possible.  Submit refill requests through [employer] or call your pharmacy and they will forward the refill request to us. Please allow 3 business days for your refill to be  "completed.          Additional Information About Your Visit        [employer] Information     [employer] lets you send messages to your doctor, view your test results, renew your prescriptions, schedule appointments and more. To sign up, go to [email or web address] . Click on \"Log in\" on the left side of the screen, which will take you to the Welcome page. Then click on \"Sign up Now\" on the right side of the page.     You will be asked to enter the access code listed below, as well as some personal information. Please follow the directions to create your username and password.     Your access code is: [email or web address]  Expires: 3/7/2018  7:09 PM     Your access code will  in 90 days. If you need help or a new code, please call your [place] clinic or [phone].        Care EveryWhere ID     This is your Care EveryWhere ID. This could be used by other organizations to access your [place] medical records  [id number]        Your Vitals Were     Pulse Temperature Height BMI (Body Mass Index)          76 98.2  F (36.8  C) (Oral) 5' 10\" (1.778 m) 42.47 kg/m2         Blood Pressure from Last 3 Encounters:   17 142/82   17 126/76   17 122/68    Weight from Last 3 Encounters:   17 296 lb (134.3 kg)   17 293 lb 8 oz (133.1 kg)   17 295 lb (133.8 kg)              Today, you had the following     No orders found for display         Today's Medication Changes          These changes are accurate as of: 17  7:09 PM.  If you have any questions, ask your nurse or doctor.               Start taking these medicines.        Dose/Directions    acyclovir 400 MG tablet   Commonly known as:  ZOVIRAX   Used for:  Recurrent cold sores   Started by:  Clarence Park MD        Dose:  400 mg   Take 1 tablet (400 mg) by mouth 3 times daily   Quantity:  30 tablet   Refills:  5            Where to get your medicines      These medications were sent to [id number] - [place], [place] - [address] [facility]  [address] [place] [address]     Phone:  [phone]     acyclovir 400 MG tablet         Some of these will need a paper prescription and others can be bought over the counter.  Ask your nurse if you have questions.     Bring a paper prescription for each of these medications     HYDROcodone-acetaminophen 5-325 MG per tablet    HYDROcodone-acetaminophen 5-325 MG per tablet    HYDROcodone-acetaminophen 5-325 MG per tablet                Primary Care Provider Office Phone # Fax #    Clarence Park [phone]       [facility] [address]        Equal Access to Services     JASMIN ZEE : Hadii jessica nava hadasho Soomaali, waaxda luqadaha, qaybta kaalmada adeegyada, humza carias . So [facility] [phone].    ATENCIÓN: Si habla español, tiene a lovett disposición servicios gratuitos de asistencia lingüística. Llame al [phone].    We comply with applicable federal civil rights laws and [place] laws. We do not discriminate on the basis of race, color, national origin, age, disability, sex, sexual orientation, or gender identity.            Thank you!     Thank you for choosing [facility]  for your care. Our goal is always to provide you with excellent care. Hearing back from our patients is one way we can continue to improve our services. Please take a few minutes to complete the written survey that you may receive in the mail after your visit with us. Thank you!             Your Updated Medication List - Protect others around you: Learn how to safely use, store and throw away your medicines at www.disposemymeds.org.          This list is accurate as of: 12/7/17  7:09 PM.  Always use your most recent med list.                   Brand Name Dispense Instructions for use Diagnosis    acyclovir 400 MG tablet    ZOVIRAX    30 tablet    Take 1 tablet (400 mg) by mouth 3 times daily    Recurrent cold sores        cyclobenzaprine 10 MG tablet    FLEXERIL    90 tablet    Take 1 tablet (10 mg) by mouth 3 times daily as needed for muscle spasms    Back pain with radiation       * HYDROcodone-acetaminophen 5-325 MG per tablet   Start taking on:  12/29/2017    NORCO    60 tablet    Take 1 tablet by mouth every 6 hours as needed for pain    Back pain with radiation, Traumatic arthritis of ankle, unspecified laterality, Chronic pain due to trauma       * HYDROcodone-acetaminophen 5-325 MG per tablet   Start taking on:  1/28/2018    NORCO    60 tablet    Take 1 tablet by mouth every 6 hours as needed for pain    Back pain with radiation, Traumatic arthritis of ankle, unspecified laterality, Chronic pain due to trauma       * HYDROcodone-acetaminophen 5-325 MG per tablet   Start taking on:  2/28/2018    NORCO    60 tablet    Take 1 tablet by mouth every 6 hours as needed for pain    Traumatic arthritis of ankle, unspecified laterality, Chronic pain due to trauma       ibuprofen 800 MG tablet    ADVIL/MOTRIN    100 tablet    TAKE 1 TABLET BY MOUTH 3 TIMES DAILY AS NEEDED FOR PAIN    Traumatic arthritis of ankle, right       metoprolol 50 MG 24 hr tablet    TOPROL-XL    30 tablet    Take 1 tablet (50 mg) by mouth daily    Essential hypertension with goal blood pressure less than 140/90       * omeprazole 20 MG CR capsule    priLOSEC    60 capsule    Take 1 capsule (20 mg) by mouth 2 times daily    Gastroesophageal reflux disease with esophagitis       * omeprazole 20 MG CR capsule    priLOSEC    60 capsule    TAKE 1 CAPSULE (20 MG) BY MOUTH 2 TIMES DAILY    Gastroesophageal reflux disease with esophagitis       * Notice:  This list has 5 medication(s) that are the same as other medications prescribed for you. Read the directions carefully, and ask your doctor or other care provider to review them with you.

## 2017-12-08 NOTE — PATIENT INSTRUCTIONS
Orders Placed This Encounter     HYDROcodone-acetaminophen (NORCO) 5-325 MG per tablet     Sig: Take 1 tablet by mouth every 6 hours as needed for pain     Dispense:  60 tablet     Refill:  0     HYDROcodone-acetaminophen (NORCO) 5-325 MG per tablet     Sig: Take 1 tablet by mouth every 6 hours as needed for pain     Dispense:  60 tablet     Refill:  0     HYDROcodone-acetaminophen (NORCO) 5-325 MG per tablet     Sig: Take 1 tablet by mouth every 6 hours as needed for pain     Dispense:  60 tablet     Refill:  0         acyclovir (ZOVIRAX) 400 MG tablet     Sig: Take 1 tablet (400 mg) by mouth 3 times daily     Dispense:  30 tablet     Refill:  5

## 2017-12-08 NOTE — PROGRESS NOTES
SUBJECTIVE:   Rakesh Carey is a 47 year old male who presents to clinic today for the following health issues:      Chronic Pain Follow-Up       Analgesia/pain control:       Recent changes:  Worse over the last few days with the cold weather      Overall control: Tolerable with discomfort  Activity level/function:      Daily activities:  Able to do all daily activities    Work:  Pain does not limit any  work  Adverse effects:  No  Adherance    Taking medication as directed?  Yes    Participating in other treatments: yes - Going to [facility] for cortisone shots  Risk Factors:    Sleep:  Good    Mood/anxiety:  controlled    Recent family or social stressors:  none noted    Other aggravating factors: Doing too many things, overdueing it.  PHQ-9 SCORE 12/17/2015 9/29/2016 12/22/2016   Total Score 1 15 3     THOMAS-7 SCORE 12/17/2015 12/22/2016   Total Score 3 3     Encounter-Level CSA - 03/24/2016:          Controlled Substance Agreement - Scan on 4/14/2016  5:08 PM : CONTROLLED SUBSTANCE AGREEMENT, 03/24/16 (below)          Controlled Substance Agreement - Scan on 3/25/2016 11:19 AM : CONTROLLED SUBSTANCE AGREEMENT (below)                  Amount of exercise or physical activity: None outside of work.  Works a lot at a [email or web address].    Problems taking medications regularly: No    Medication side effects: none    Diet: regular (no restrictions)            Problem list and histories reviewed & adjusted, as indicated.  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 No family hx of      DIABETES No family hx of      Hypertension No family hx of      CEREBROVASCULAR DISEASE No family hx of      Breast Cancer No family hx of          Current Outpatient Prescriptions   Medication Sig Dispense Refill     ibuprofen (ADVIL/MOTRIN) 800 MG tablet TAKE 1 TABLET BY MOUTH 3 TIMES DAILY AS NEEDED FOR PAIN 100 tablet 6     HYDROcodone-acetaminophen (NORCO) 5-325 MG per tablet Take 1 tablet by mouth every 6 hours as needed for pain 60 tablet 0     omeprazole (PRILOSEC) 20 MG CR capsule TAKE 1 CAPSULE (20 MG) BY MOUTH 2 TIMES DAILY 60 capsule 9     metoprolol (TOPROL-XL) 50 MG 24 hr tablet Take 1 tablet (50 mg) by mouth daily 30 tablet 12     cyclobenzaprine (FLEXERIL) 10 MG tablet Take 1 tablet (10 mg) by mouth 3 times daily as needed for muscle spasms 90 tablet 3     HYDROcodone-acetaminophen (NORCO) 5-325 MG per tablet Take 1 tablet by mouth every 6 hours as needed for pain 60 tablet 0     HYDROcodone-acetaminophen (NORCO) 5-325 MG per tablet Take 1 tablet by mouth every 6 hours as needed for pain 60 tablet 0     omeprazole (PRILOSEC) 20 MG CR capsule Take 1 capsule (20 mg) by mouth 2 times daily 60 capsule 12     No Known Allergies  Recent Labs   Lab Test  07/06/17   1826  06/30/16   1833  06/25/15   1833   05/23/11   1738  05/07/10   0723   A1C   --    --    --    --   5.6  5.7   LDL  147*  149*  127   < >  131*  154*   HDL  46  42  37*   < >  47  29*   TRIG  152*  78  96   < >  285*  131   ALT   --    --     "--    --   35   --    CR  0.88  0.73  0.77   < >  0.92   --    GFRESTIMATED  >90  Non  GFR Calc    >90  Non  GFR Calc    >90  Non  GFR Calc     < >  >90   --    GFRESTBLACK  >90   GFR Calc    >90   GFR Calc    >90   GFR Calc     < >  >90   --    POTASSIUM  3.9  4.0  4.2   < >  4.0   --    TSH   --    --    --    --   2.67  1.21    < > = values in this interval not displayed.      BP Readings from Last 3 Encounters:   12/07/17 142/82   09/28/17 126/76   07/31/17 122/68    Wt Readings from Last 3 Encounters:   12/07/17 134.3 kg (296 lb)   09/28/17 133.1 kg (293 lb 8 oz)   07/31/17 133.8 kg (295 lb)                  Labs reviewed in EPIC          Reviewed and updated as needed this visit by clinical staff     Reviewed and updated as needed this visit by Provider         ROS:  Constitutional, HEENT, cardiovascular, pulmonary, GI, , musculoskeletal, neuro, skin, endocrine and psych systems are negative, except as otherwise noted.    This document serves as a record of the services and decisions personally performed and made by Chao Park MD. It was created on their behalf by Mauricio Brownlee, a trained medical scribe. The creation of this document is based the provider's statements to the medical scribe.  Mauricio Brownlee December 7, 2017 6:50 PM         OBJECTIVE:   /82 (BP Location: Right arm, Cuff Size: Adult Large)  Pulse 76  Temp 98.2  F (36.8  C) (Oral)  Ht 1.778 m (5' 10\")  Wt 134.3 kg (296 lb)  BMI 42.47 kg/m2  Body mass index is 42.47 kg/(m^2).  GENERAL: healthy, alert and no distress  HENT: ear canals and TM's normal, nose and mouth without ulcers or lesions  NECK: no adenopathy, no asymmetry, masses, or scars and thyroid normal to palpation  RESP: lungs clear to auscultation - no rales, rhonchi or wheezes  CV: regular rate and rhythm, normal S1 S2, no S3 or S4, no murmur, click or rub  MS: no gross " musculoskeletal defects noted, severe arthritic changes of ankles b/l  SKIN: healing cold sore on lower lip  PSYCH: mentation appears normal, affect normal/bright    Diagnostic Test Results:  none     ASSESSMENT/PLAN:   (B00.1) Recurrent cold sores  (primary encounter diagnosis)  Comment: recurrent 1-2x year, recommended use of acyclovir PRN vs prophylactically.    Plan: acyclovir (ZOVIRAX) 400 MG tablet        -follow up as needed    (M12.579) Traumatic arthritis of ankle, right  Comment: Controlled.  Needs medications refilled.   Plan: HYDROcodone-acetaminophen (NORCO) 5-325 MG per         tablet, HYDROcodone-acetaminophen (NORCO) 5-325        MG per tablet        -medications refilled, continue present medications    (G89.21) Chronic pain due to trauma  Comment: controlled  Plan: HYDROcodone-acetaminophen (NORCO) 5-325 MG per         tablet, HYDROcodone-acetaminophen (NORCO) 5-325        MG per tablet, HYDROcodone-acetaminophen         (NORCO) 5-325 MG per tablet            (M54.9) Back pain with radiation  Comment: controlled  Plan: HYDROcodone-acetaminophen (NORCO) 5-325 MG per         tablet, HYDROcodone-acetaminophen (NORCO) 5-325        MG per tablet            (M12.579) Traumatic arthritis of ankle, unspecified laterality  Comment: controlled  Plan: HYDROcodone-acetaminophen (NORCO) 5-325 MG per         tablet            The information in this document, created by the medical scribe for me, accurately reflects the services I personally performed and the decisions made by me. I have reviewed and approved this document for accuracy prior to leaving the patient care area.    Clarence Park MD, MD  [facility]

## 2018-03-08 ENCOUNTER — OFFICE VISIT (OUTPATIENT)
Dept: FAMILY MEDICINE | Facility: CLINIC | Age: 48
End: 2018-03-08
Payer: COMMERCIAL

## 2018-03-08 VITALS
TEMPERATURE: 98 F | SYSTOLIC BLOOD PRESSURE: 128 MMHG | WEIGHT: 287 LBS | DIASTOLIC BLOOD PRESSURE: 78 MMHG | BODY MASS INDEX: 41.09 KG/M2 | HEART RATE: 72 BPM | HEIGHT: 70 IN

## 2018-03-08 DIAGNOSIS — M12.571 TRAUMATIC ARTHRITIS OF ANKLE, RIGHT: ICD-10-CM

## 2018-03-08 DIAGNOSIS — M12.579 TRAUMATIC ARTHRITIS OF ANKLE, UNSPECIFIED LATERALITY: ICD-10-CM

## 2018-03-08 DIAGNOSIS — G89.21 CHRONIC PAIN DUE TO TRAUMA: ICD-10-CM

## 2018-03-08 DIAGNOSIS — M54.9 BACK PAIN WITH RADIATION: ICD-10-CM

## 2018-03-08 DIAGNOSIS — I10 ESSENTIAL HYPERTENSION WITH GOAL BLOOD PRESSURE LESS THAN 140/90: ICD-10-CM

## 2018-03-08 PROCEDURE — 99213 OFFICE O/P EST LOW 20 MIN: CPT | Performed by: FAMILY MEDICINE

## 2018-03-08 RX ORDER — HYDROCODONE BITARTRATE AND ACETAMINOPHEN 5; 325 MG/1; MG/1
1 TABLET ORAL EVERY 6 HOURS PRN
Qty: 90 TABLET | Refills: 0 | Status: SHIPPED | OUTPATIENT
Start: 2018-04-28 | End: 2018-06-28

## 2018-03-08 RX ORDER — IBUPROFEN 800 MG/1
TABLET, FILM COATED ORAL
Qty: 100 TABLET | Refills: 6 | Status: SHIPPED | OUTPATIENT
Start: 2018-03-08 | End: 2019-06-27

## 2018-03-08 RX ORDER — METOPROLOL SUCCINATE 50 MG/1
50 TABLET, EXTENDED RELEASE ORAL DAILY
Qty: 90 TABLET | Refills: 3 | Status: SHIPPED | OUTPATIENT
Start: 2018-03-08 | End: 2019-03-27

## 2018-03-08 RX ORDER — HYDROCODONE BITARTRATE AND ACETAMINOPHEN 5; 325 MG/1; MG/1
1 TABLET ORAL EVERY 6 HOURS PRN
Qty: 90 TABLET | Refills: 0 | Status: SHIPPED | OUTPATIENT
Start: 2018-03-28 | End: 2018-06-28

## 2018-03-08 RX ORDER — HYDROCODONE BITARTRATE AND ACETAMINOPHEN 5; 325 MG/1; MG/1
1 TABLET ORAL EVERY 6 HOURS PRN
Qty: 90 TABLET | Refills: 0 | Status: SHIPPED | OUTPATIENT
Start: 2018-05-28 | End: 2018-06-28

## 2018-03-08 NOTE — MR AVS SNAPSHOT
After Visit Summary   3/8/2018    Rakesh Carey    MRN: [id number]           Patient Information     Date Of Birth          1970        Visit Information        Provider Department      3/8/2018 6:00 PM Clarence Park MD [facility]        Today's Diagnoses     Essential hypertension with goal blood pressure less than 140/90        Traumatic arthritis of ankle, right        Traumatic arthritis of ankle, right        Chronic pain due to trauma        Back pain with radiation        Traumatic arthritis of ankle, unspecified laterality        Gastroesophageal reflux disease with esophagitis          Care Instructions    Orders Placed This Encounter     metoprolol succinate (TOPROL-XL) 50 MG 24 hr tablet     Sig: Take 1 tablet (50 mg) by mouth daily     Dispense:  90 tablet     Refill:  3     ibuprofen (ADVIL/MOTRIN) 800 MG tablet     Sig: TAKE 1 TABLET BY MOUTH 3 TIMES DAILY AS NEEDED FOR PAIN     Dispense:  100 tablet     Refill:  6     HYDROcodone-acetaminophen (NORCO) 5-325 MG per tablet     Sig: Take 1 tablet by mouth every 6 hours as needed for pain     Dispense:  90 tablet     Refill:  0     HYDROcodone-acetaminophen (NORCO) 5-325 MG per tablet     Sig: Take 1 tablet by mouth every 6 hours as needed for pain     Dispense:  90 tablet     Refill:  0     HYDROcodone-acetaminophen (NORCO) 5-325 MG per tablet     Sig: Take 1 tablet by mouth every 6 hours as needed for pain     Dispense:  90 tablet     Refill:  0               Follow-ups after your visit        Your next 10 appointments already scheduled     Jun 28, 2018  6:00 PM CDT   Pre-Op physical with Clarence Park MD   [facility] ([address] [id number] [phone]            Jul 12, 2018  6:00 PM CDT   PHYSICAL with Clarence Park MD   [facility] ([address]  "[facility] [id number] [phone]              Who to contact     If you have questions or need follow up information about today's clinic visit or your schedule please contact [facility] directly at [phone].  Normal or non-critical lab and imaging results will be communicated to you by MyChart, letter or phone within 4 business days after the clinic has received the results. If you do not hear from us within 7 days, please contact the clinic through MyChart or phone. If you have a critical or abnormal lab result, we will notify you by phone as soon as possible.  Submit refill requests through Private Outlet or call your pharmacy and they will forward the refill request to us. Please allow 3 business days for your refill to be completed.          Additional Information About Your Visit        [employer] Information     Private Outlet lets you send messages to your doctor, view your test results, renew your prescriptions, schedule appointments and more. To sign up, go to [email or web address] . Click on \"Log in\" on the left side of the screen, which will take you to the Welcome page. Then click on \"Sign up Now\" on the right side of the page.     You will be asked to enter the access code listed below, as well as some personal information. Please follow the directions to create your username and password.     Your access code is: [email or web address]  Expires: 2018  6:00 PM     Your access code will  in 90 days. If you need help or a new code, please call your [facility] or [phone].        Care EveryWhere ID     This is your Care EveryWhere ID. This could be used by other organizations to access your [facility] medical records  [id number]        Your Vitals Were     Pulse Temperature Height BMI (Body Mass Index)          72 98  F (36.7  C) (Oral) 5' 10\" (1.778 m) 41.18 kg/m2         Blood Pressure from Last 3 Encounters:   18 128/78   17 142/82   17 126/76    Weight " from Last 3 Encounters:   03/08/18 287 lb (130.2 kg)   12/07/17 296 lb (134.3 kg)   09/28/17 293 lb 8 oz (133.1 kg)              Today, you had the following     No orders found for display         Today's Medication Changes          These changes are accurate as of 3/8/18  6:00 PM.  If you have any questions, ask your nurse or doctor.               These medicines have changed or have updated prescriptions.        Dose/Directions    ibuprofen 800 MG tablet   Commonly known as:  ADVIL/MOTRIN   This may have changed:  See the new instructions.   Used for:  Traumatic arthritis of ankle, right   Changed by:  Clarence Park MD        TAKE 1 TABLET BY MOUTH 3 TIMES DAILY AS NEEDED FOR PAIN   Quantity:  100 tablet   Refills:  6            Where to get your medicines      These medications were sent to [facility]/pharmacy #[id number] - [place], [place] - [address]  [address] [address]     Phone:  [phone]     ibuprofen 800 MG tablet    metoprolol succinate 50 MG 24 hr tablet         Some of these will need a paper prescription and others can be bought over the counter.  Ask your nurse if you have questions.     Bring a paper prescription for each of these medications     HYDROcodone-acetaminophen 5-325 MG per tablet    HYDROcodone-acetaminophen 5-325 MG per tablet    HYDROcodone-acetaminophen 5-325 MG per tablet                Primary Care Provider Office Phone # Fax #    Clarence Park [phone]       [address] [place] [address]        Equal Access to Services     JASMIN ZEE AH: Genaro narvaezo Sotona, waaxda luqadaha, qaybta kaalmada adeegyada, humza trujillo. So [facility] [phone].    ATENCIÓN: Si habla español, tiene a lovett disposición servicios gratuitos de asistencia lingüística. Llame al [phone].    We comply with applicable federal civil rights laws and [place] laws. We do not discriminate on the basis of  race, color, national origin, age, disability, sex, sexual orientation, or gender identity.            Thank you!     Thank you for choosing [facility]  for your care. Our goal is always to provide you with excellent care. Hearing back from our patients is one way we can continue to improve our services. Please take a few minutes to complete the written survey that you may receive in the mail after your visit with us. Thank you!             Your Updated Medication List - Protect others around you: Learn how to safely use, store and throw away your medicines at www.disposemymeds.org.          This list is accurate as of 3/8/18  6:00 PM.  Always use your most recent med list.                   Brand Name Dispense Instructions for use Diagnosis    acyclovir 400 MG tablet    ZOVIRAX    30 tablet    Take 1 tablet (400 mg) by mouth 3 times daily    Recurrent cold sores       cyclobenzaprine 10 MG tablet    FLEXERIL    90 tablet    Take 1 tablet (10 mg) by mouth 3 times daily as needed for muscle spasms    Back pain with radiation       * HYDROcodone-acetaminophen 5-325 MG per tablet   Start taking on:  3/28/2018    NORCO    90 tablet    Take 1 tablet by mouth every 6 hours as needed for pain    Back pain with radiation, Traumatic arthritis of ankle, unspecified laterality, Chronic pain due to trauma       * HYDROcodone-acetaminophen 5-325 MG per tablet   Start taking on:  4/28/2018    NORCO    90 tablet    Take 1 tablet by mouth every 6 hours as needed for pain    Back pain with radiation, Traumatic arthritis of ankle, unspecified laterality, Chronic pain due to trauma       * HYDROcodone-acetaminophen 5-325 MG per tablet   Start taking on:  5/28/2018    NORCO    90 tablet    Take 1 tablet by mouth every 6 hours as needed for pain    Traumatic arthritis of ankle, unspecified laterality, Chronic pain due to trauma       ibuprofen 800 MG tablet    ADVIL/MOTRIN    100 tablet    TAKE 1 TABLET BY MOUTH 3  TIMES DAILY AS NEEDED FOR PAIN    Traumatic arthritis of ankle, right       metoprolol succinate 50 MG 24 hr tablet    TOPROL-XL    90 tablet    Take 1 tablet (50 mg) by mouth daily    Essential hypertension with goal blood pressure less than 140/90       * omeprazole 20 MG CR capsule    priLOSEC    60 capsule    Take 1 capsule (20 mg) by mouth 2 times daily    Gastroesophageal reflux disease with esophagitis       * omeprazole 20 MG CR capsule    priLOSEC    60 capsule    TAKE 1 CAPSULE (20 MG) BY MOUTH 2 TIMES DAILY    Gastroesophageal reflux disease with esophagitis       * Notice:  This list has 5 medication(s) that are the same as other medications prescribed for you. Read the directions carefully, and ask your doctor or other care provider to review them with you.

## 2018-03-08 NOTE — PATIENT INSTRUCTIONS
Orders Placed This Encounter     metoprolol succinate (TOPROL-XL) 50 MG 24 hr tablet     Sig: Take 1 tablet (50 mg) by mouth daily     Dispense:  90 tablet     Refill:  3     ibuprofen (ADVIL/MOTRIN) 800 MG tablet     Sig: TAKE 1 TABLET BY MOUTH 3 TIMES DAILY AS NEEDED FOR PAIN     Dispense:  100 tablet     Refill:  6     HYDROcodone-acetaminophen (NORCO) 5-325 MG per tablet     Sig: Take 1 tablet by mouth every 6 hours as needed for pain     Dispense:  90 tablet     Refill:  0     HYDROcodone-acetaminophen (NORCO) 5-325 MG per tablet     Sig: Take 1 tablet by mouth every 6 hours as needed for pain     Dispense:  90 tablet     Refill:  0     HYDROcodone-acetaminophen (NORCO) 5-325 MG per tablet     Sig: Take 1 tablet by mouth every 6 hours as needed for pain     Dispense:  90 tablet     Refill:  0

## 2018-03-08 NOTE — PROGRESS NOTES
SUBJECTIVE:   Rakesh Carey is a 47 year old male who presents to clinic today for the following health issues:      Hypertension Follow-up      Outpatient blood pressures are not being checked.    Low Salt Diet: no added salt    Chronic Pain Follow-Up       Type / Location of Pain: right ankle  Analgesia/pain control:       Recent changes:  worse      Overall control: Inadequate pain control  Activity level/function:      Daily activities:  Able to do all daily activities    Work:  not applicable  Adverse effects:  No  Adherance    Taking medication as directed?  Yes    Participating in other treatments: No  Risk Factors:    Sleep:  Fair to poor    Mood/anxiety:  controlled    Recent family or social stressors:  none noted    Other aggravating factors: none  PHQ-9 SCORE 12/17/2015 9/29/2016 12/22/2016   Total Score 1 15 3     THOMAS-7 SCORE 12/17/2015 12/22/2016   Total Score 3 3     Encounter-Level CSA - 03/24/2016:          Controlled Substance Agreement - Scan on 4/14/2016  5:08 PM : CONTROLLED SUBSTANCE AGREEMENT, 03/24/16 (below)          Controlled Substance Agreement - Scan on 3/25/2016 11:19 AM : CONTROLLED SUBSTANCE AGREEMENT (below)                Amount of exercise or physical activity:     Problems taking medications regularly: No    Medication side effects: none    Diet: low salt and low carbs      Pt scheduled for definitve surgery in July by Dr. Darling. Has scheduled preoperative physical in June. Has lost 10 lbs and will continue to work on losing weight for surgery. Reviewed with patient need to take care of dental procedures prior to joint replacement surgery.       Problem list and histories reviewed & adjusted, as indicated.  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 No family hx of      DIABETES No family hx of      Hypertension No family hx of      CEREBROVASCULAR DISEASE No family hx of      Breast Cancer No family hx of          Current Outpatient Prescriptions   Medication Sig Dispense Refill     metoprolol succinate (TOPROL-XL) 50 MG 24 hr tablet Take 1 tablet (50 mg) by mouth daily 90 tablet 3     ibuprofen (ADVIL/MOTRIN) 800 MG tablet TAKE 1 TABLET BY MOUTH 3 TIMES DAILY AS NEEDED FOR PAIN 100 tablet 6     [START ON 5/28/2018] HYDROcodone-acetaminophen (NORCO) 5-325 MG per tablet Take 1 tablet by mouth every 6 hours as needed for pain 90 tablet 0     [START ON 4/28/2018] HYDROcodone-acetaminophen (NORCO) 5-325 MG per tablet Take 1 tablet by mouth every 6 hours as needed for pain 90 tablet 0     [START ON 3/28/2018] HYDROcodone-acetaminophen (NORCO) 5-325 MG per tablet Take 1 tablet by mouth every 6 hours as needed for pain 90 tablet 0     acyclovir (ZOVIRAX) 400 MG tablet Take 1 tablet (400 mg) by mouth 3 times daily 30 tablet 5     omeprazole (PRILOSEC) 20 MG CR capsule TAKE 1 CAPSULE (20 MG) BY MOUTH 2 TIMES DAILY 60 capsule 9     cyclobenzaprine (FLEXERIL) 10 MG tablet Take 1 tablet (10 mg) by mouth 3 times daily as needed for muscle spasms 90 tablet 3      "omeprazole (PRILOSEC) 20 MG CR capsule Take 1 capsule (20 mg) by mouth 2 times daily 60 capsule 12     [DISCONTINUED] metoprolol (TOPROL-XL) 50 MG 24 hr tablet Take 1 tablet (50 mg) by mouth daily 30 tablet 12     No Known Allergies  Recent Labs   Lab Test  07/06/17   1826  06/30/16   1833  06/25/15   1833   05/23/11   1738  05/07/10   0723   A1C   --    --    --    --   5.6  5.7   LDL  147*  149*  127   < >  131*  154*   HDL  46  42  37*   < >  47  29*   TRIG  152*  78  96   < >  285*  131   ALT   --    --    --    --   35   --    CR  0.88  0.73  0.77   < >  0.92   --    GFRESTIMATED  >90  Non  GFR Calc    >90  Non  GFR Calc    >90  Non  GFR Calc     < >  >90   --    GFRESTBLACK  >90   GFR Calc    >90   GFR Calc    >90   GFR Calc     < >  >90   --    POTASSIUM  3.9  4.0  4.2   < >  4.0   --    TSH   --    --    --    --   2.67  1.21    < > = values in this interval not displayed.      BP Readings from Last 3 Encounters:   03/08/18 128/78   12/07/17 142/82   09/28/17 126/76    Wt Readings from Last 3 Encounters:   03/08/18 130.2 kg (287 lb)   12/07/17 134.3 kg (296 lb)   09/28/17 133.1 kg (293 lb 8 oz)                  Labs reviewed in EPIC    Reviewed and updated as needed this visit by clinical staff  Allergies       Reviewed and updated as needed this visit by Provider         ROS:  Constitutional, HEENT, cardiovascular, pulmonary, GI, , musculoskeletal, neuro, skin, endocrine and psych systems are negative, except as otherwise noted.    OBJECTIVE:     /78 (BP Location: Right arm, Cuff Size: Adult Large)  Pulse 72  Temp 98  F (36.7  C) (Oral)  Ht 1.778 m (5' 10\")  Wt 130.2 kg (287 lb)  BMI 41.18 kg/m2  Body mass index is 41.18 kg/(m^2).  GENERAL: healthy, alert and no distress  HENT: ear canals and TM's normal, nose and mouth without ulcers or lesions  NECK: no adenopathy, no asymmetry, masses, or scars " and thyroid normal to palpation  RESP: lungs clear to auscultation - no rales, rhonchi or wheezes  CV: regular rate and rhythm, normal S1 S2, no S3 or S4, no murmur, click or rub  MS: no gross musculoskeletal defects noted -- arthritic changes of ankles bilaterally  SKIN: no suspicious lesions or rashes  PSYCH: mentation appears normal, affect normal/bright    Diagnostic Test Results:  none     ASSESSMENT/PLAN:   (I10) Essential hypertension with goal blood pressure less than 140/90  Comment: controlled  Plan: metoprolol succinate (TOPROL-XL) 50 MG 24 hr         tablet        -medications refilled, continue present medications    (M12.571) Traumatic arthritis of ankle, right  Comment: controlled  Plan: ibuprofen (ADVIL/MOTRIN) 800 MG tablet        -scheduled for surgery in July and preoperative physical in June.     (M12.579) Traumatic arthritis of ankle, right  Comment:   Plan: HYDROcodone-acetaminophen (NORCO) 5-325 MG per         tablet, HYDROcodone-acetaminophen (NORCO) 5-325        MG per tablet            (G89.21) Chronic pain due to trauma  Comment:   Plan: HYDROcodone-acetaminophen (NORCO) 5-325 MG per         tablet, HYDROcodone-acetaminophen (NORCO) 5-325        MG per tablet, HYDROcodone-acetaminophen         (NORCO) 5-325 MG per tablet            (M54.9) Back pain with radiation  Comment:   Plan: HYDROcodone-acetaminophen (NORCO) 5-325 MG per         tablet, HYDROcodone-acetaminophen (NORCO) 5-325        MG per tablet            (M12.579) Traumatic arthritis of ankle, unspecified laterality  Comment:   Plan: HYDROcodone-acetaminophen (NORCO) 5-325 MG per         tablet            Clarence Park MD, MD  [facility]

## 2018-04-20 ENCOUNTER — TELEPHONE (OUTPATIENT)
Dept: FAMILY MEDICINE | Facility: CLINIC | Age: 48
End: 2018-04-20

## 2018-04-20 NOTE — TELEPHONE ENCOUNTER
Reason for Call:  Other Requesting    Detailed comments: Patient wife requesting if pre-op and physical appts can be combined. Please advise.     Phone Number Patient can be reached at: Home number on file [phone] (home)    Best Time: Anytime    Can we leave a detailed message on this number? YES    Call taken on 4/20/2018 at 8:46 AM by Beulah Hutchins

## 2018-04-23 NOTE — TELEPHONE ENCOUNTER
Called patients wife and she said that she wanted to keep the two separate appointments because there insurance isn't the greatest and she is afraid things will not get paid correctly.    Starr Richard

## 2018-06-28 ENCOUNTER — OFFICE VISIT (OUTPATIENT)
Dept: FAMILY MEDICINE | Facility: CLINIC | Age: 48
End: 2018-06-28
Payer: COMMERCIAL

## 2018-06-28 VITALS
DIASTOLIC BLOOD PRESSURE: 87 MMHG | SYSTOLIC BLOOD PRESSURE: 133 MMHG | OXYGEN SATURATION: 95 % | HEART RATE: 66 BPM | WEIGHT: 289.25 LBS | HEIGHT: 70 IN | BODY MASS INDEX: 41.41 KG/M2 | TEMPERATURE: 98.4 F

## 2018-06-28 DIAGNOSIS — Z01.818 PREOP GENERAL PHYSICAL EXAM: Primary | ICD-10-CM

## 2018-06-28 DIAGNOSIS — Z11.4 SCREENING FOR HIV (HUMAN IMMUNODEFICIENCY VIRUS): ICD-10-CM

## 2018-06-28 DIAGNOSIS — I10 HYPERTENSION GOAL BP (BLOOD PRESSURE) < 140/90: ICD-10-CM

## 2018-06-28 DIAGNOSIS — K21.00 GASTROESOPHAGEAL REFLUX DISEASE WITH ESOPHAGITIS: ICD-10-CM

## 2018-06-28 DIAGNOSIS — M54.9 BACK PAIN WITH RADIATION: ICD-10-CM

## 2018-06-28 DIAGNOSIS — M12.579 TRAUMATIC ARTHRITIS OF ANKLE, UNSPECIFIED LATERALITY: ICD-10-CM

## 2018-06-28 DIAGNOSIS — G89.21 CHRONIC PAIN DUE TO TRAUMA: ICD-10-CM

## 2018-06-28 LAB
ERYTHROCYTE [DISTWIDTH] IN BLOOD BY AUTOMATED COUNT: 14.4 % (ref 10–15)
HCT VFR BLD AUTO: 37.6 % (ref 40–53)
HGB BLD-MCNC: 12.7 G/DL (ref 13.3–17.7)
MCH RBC QN AUTO: 29 PG (ref 26.5–33)
MCHC RBC AUTO-ENTMCNC: 33.8 G/DL (ref 31.5–36.5)
MCV RBC AUTO: 86 FL (ref 78–100)
PLATELET # BLD AUTO: 197 10E9/L (ref 150–450)
RBC # BLD AUTO: 4.38 10E12/L (ref 4.4–5.9)
WBC # BLD AUTO: 6.8 10E9/L (ref 4–11)

## 2018-06-28 PROCEDURE — 87389 HIV-1 AG W/HIV-1&-2 AB AG IA: CPT | Performed by: FAMILY MEDICINE

## 2018-06-28 PROCEDURE — 85027 COMPLETE CBC AUTOMATED: CPT | Performed by: FAMILY MEDICINE

## 2018-06-28 PROCEDURE — 36415 COLL VENOUS BLD VENIPUNCTURE: CPT | Performed by: FAMILY MEDICINE

## 2018-06-28 PROCEDURE — 80061 LIPID PANEL: CPT | Performed by: FAMILY MEDICINE

## 2018-06-28 PROCEDURE — 99214 OFFICE O/P EST MOD 30 MIN: CPT | Performed by: FAMILY MEDICINE

## 2018-06-28 PROCEDURE — 80048 BASIC METABOLIC PNL TOTAL CA: CPT | Performed by: FAMILY MEDICINE

## 2018-06-28 PROCEDURE — 93000 ELECTROCARDIOGRAM COMPLETE: CPT | Performed by: FAMILY MEDICINE

## 2018-06-28 RX ORDER — HYDROCODONE BITARTRATE AND ACETAMINOPHEN 5; 325 MG/1; MG/1
1 TABLET ORAL EVERY 6 HOURS PRN
Qty: 90 TABLET | Refills: 0 | Status: SHIPPED | OUTPATIENT
Start: 2018-07-27 | End: 2018-12-20

## 2018-06-28 RX ORDER — HYDROCODONE BITARTRATE AND ACETAMINOPHEN 5; 325 MG/1; MG/1
1 TABLET ORAL EVERY 6 HOURS PRN
Qty: 90 TABLET | Refills: 0 | Status: SHIPPED | OUTPATIENT
Start: 2018-08-27 | End: 2018-12-20

## 2018-06-28 RX ORDER — HYDROCODONE BITARTRATE AND ACETAMINOPHEN 5; 325 MG/1; MG/1
1 TABLET ORAL EVERY 6 HOURS PRN
Qty: 90 TABLET | Refills: 0 | Status: SHIPPED | OUTPATIENT
Start: 2018-06-28 | End: 2018-12-20

## 2018-06-28 NOTE — LETTER
"July 5, 2018      Rakesh Carey  [address]   [facility] [facility] [address]        Dear Rakesh,       The results of your recent lab tests were overall within normal limits.  Your cholesterol is basically unchanged and mildly elevated.  Diet and exercise are recommended for improvement.     Lipid goals:     Cholesterol: Desirable is less than 200, Borderline is 200-240   HDL (Good Cholesterol): Desirable is greater than 40   LDL (Bad Cholesterol): Desirable is less than 130, Borderline 130-160   Triglycerides: Desirable is less than 150,  Borderline is 150-400     Ways to improve your cholesterol...     1- Eats less saturated fats (including avoiding \"trans\" fats).     2 - Eat more unsaturated fats  - found in vegetables, grains, and tree nuts.   Also by replacing butter with canola oil or olive oil.     3 - Eat more nuts.   1-2 ounces (a small handful) of almonds, walnuts, hazelnuts or pecans once a day in place of other less healthy snacks.     4 - Eat more high fiber foods - vegetables and whole grains including oat bran, oats, beans, peas, and flax seed.     5 - Eat more fish - such as salmon, tuna, mackerel, and sardines.  1 or 2 six ounce servings per week is a healthy replacement for other proteins.     6 - Exercise for at least 120 minutes per week - which is equal to 30 minutes 4 days per week.       Enclosed is a copy of these results.  If you have any further questions or problems, please contact our office.     Sincerely,        Clarence Park MD      Results for orders placed or performed in visit on 06/28/18   HIV Screening   Result Value Ref Range    HIV Antigen Antibody Combo Nonreactive NR^Nonreactive       Lipid panel reflex to direct LDL Fasting   Result Value Ref Range    Cholesterol 211 (H) <200 mg/dL    Triglycerides 101 <150 mg/dL    HDL Cholesterol 46 >39 mg/dL    LDL Cholesterol Calculated 145 (H) <100 mg/dL    Non HDL Cholesterol 165 (H) <130 mg/dL   Basic metabolic panel   Result " Value Ref Range    Sodium 140 133 - 144 mmol/L    Potassium 3.8 3.4 - 5.3 mmol/L    Chloride 106 94 - 109 mmol/L    Carbon Dioxide 23 20 - 32 mmol/L    Anion Gap 11 3 - 14 mmol/L    Glucose 77 70 - 99 mg/dL    Urea Nitrogen 13 7 - 30 mg/dL    Creatinine 0.80 0.66 - 1.25 mg/dL    GFR Estimate >90 >60 mL/min/1.7m2    GFR Estimate If Black >90 >60 mL/min/1.7m2    Calcium 8.9 8.5 - 10.1 mg/dL   CBC with platelets   Result Value Ref Range    WBC 6.8 4.0 - 11.0 10e9/L    RBC Count 4.38 (L) 4.4 - 5.9 10e12/L    Hemoglobin 12.7 (L) 13.3 - 17.7 g/dL    Hematocrit 37.6 (L) 40.0 - 53.0 %    MCV 86 78 - 100 fl    MCH 29.0 26.5 - 33.0 pg    MCHC 33.8 31.5 - 36.5 g/dL    RDW 14.4 10.0 - 15.0 %    Platelet Count 197 150 - 450 10e9/L
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
## 2018-06-28 NOTE — PATIENT INSTRUCTIONS
Before Your Surgery      Call your surgeon if there is any change in your health. This includes signs of a cold or flu (such as a sore throat, runny nose, cough, rash or fever).    Do not smoke, drink alcohol or take over the counter medicine (unless your surgeon or primary care doctor tells you to) for the 24 hours before and after surgery.    If you take prescribed drugs: Follow your doctor s orders about which medicines to take and which to stop until after surgery.    Eating and drinking prior to surgery: follow the instructions from your surgeon    Take a shower or bath the night before surgery. Use the soap your surgeon gave you to gently clean your skin. If you do not have soap from your surgeon, use your regular soap. Do not shave or scrub the surgery site.  Wear clean pajamas and have clean sheets on your bed.     Okay to take all your medications on the day of surgery    -avoid NSAID (ibuprofen, Aleve) and herbal supplements 5 days before surgery    Orders Placed This Encounter     HIV Screening     Lipid panel reflex to direct LDL Fasting     Last Lab Result: LDL Cholesterol Calculated (mg/dL)       Date                     Value                 07/06/2017               147 (H)          ----------     Order Specific Question:   Perform labs while fasting or non-fasting?     Answer:   Fasting     Basic metabolic panel     CBC with platelets     Last Lab Result: Hemoglobin (g/dL)       Date                     Value                 09/01/2010               14.7             ----------     HYDROcodone-acetaminophen (NORCO) 5-325 MG per tablet     Sig: Take 1 tablet by mouth every 6 hours as needed for pain     Dispense:  90 tablet     Refill:  0     HYDROcodone-acetaminophen (NORCO) 5-325 MG per tablet     Sig: Take 1 tablet by mouth every 6 hours as needed for pain     Dispense:  90 tablet     Refill:  0     HYDROcodone-acetaminophen (NORCO) 5-325 MG per tablet     Sig: Take 1 tablet by mouth every 6 hours  as needed for pain     Dispense:  90 tablet     Refill:  0     omeprazole (PRILOSEC) 20 MG CR capsule     Sig: TAKE 1 CAPSULE (20 MG) BY MOUTH 2 TIMES DAILY     Dispense:  60 capsule     Refill:  9

## 2018-06-28 NOTE — MR AVS SNAPSHOT
After Visit Summary   6/28/2018    Rakesh Carey    MRN: [id number]           Patient Information     Date Of Birth          1970        Visit Information        Provider Department      6/28/2018 6:00 PM Clarence Park MD [facility]        Today's Diagnoses     Preop general physical exam    -  1    Traumatic arthritis of ankle, right        Hypertension goal BP (blood pressure) < 140/90        BMI 40.0-44.9, adult (H)        Chronic pain due to trauma        Back pain with radiation        Traumatic arthritis of ankle, unspecified laterality        Screening for HIV (human immunodeficiency virus)        Gastroesophageal reflux disease with esophagitis          Care Instructions      Before Your Surgery      Call your surgeon if there is any change in your health. This includes signs of a cold or flu (such as a sore throat, runny nose, cough, rash or fever).    Do not smoke, drink alcohol or take over the counter medicine (unless your surgeon or primary care doctor tells you to) for the 24 hours before and after surgery.    If you take prescribed drugs: Follow your doctor s orders about which medicines to take and which to stop until after surgery.    Eating and drinking prior to surgery: follow the instructions from your surgeon    Take a shower or bath the night before surgery. Use the soap your surgeon gave you to gently clean your skin. If you do not have soap from your surgeon, use your regular soap. Do not shave or scrub the surgery site.  Wear clean pajamas and have clean sheets on your bed.     Okay to take all your medications on the day of surgery    -avoid NSAID (ibuprofen, Aleve) and herbal supplements 5 days before surgery    Orders Placed This Encounter     HIV Screening     Lipid panel reflex to direct LDL Fasting     Last Lab Result: LDL Cholesterol Calculated (mg/dL)       Date                     Value                 07/06/2017               147 (H)           ----------     Order Specific Question:   Perform labs while fasting or non-fasting?     Answer:   Fasting     Basic metabolic panel     CBC with platelets     Last Lab Result: Hemoglobin (g/dL)       Date                     Value                 09/01/2010               14.7             ----------     HYDROcodone-acetaminophen (NORCO) 5-325 MG per tablet     Sig: Take 1 tablet by mouth every 6 hours as needed for pain     Dispense:  90 tablet     Refill:  0     HYDROcodone-acetaminophen (NORCO) 5-325 MG per tablet     Sig: Take 1 tablet by mouth every 6 hours as needed for pain     Dispense:  90 tablet     Refill:  0     HYDROcodone-acetaminophen (NORCO) 5-325 MG per tablet     Sig: Take 1 tablet by mouth every 6 hours as needed for pain     Dispense:  90 tablet     Refill:  0     omeprazole (PRILOSEC) 20 MG CR capsule     Sig: TAKE 1 CAPSULE (20 MG) BY MOUTH 2 TIMES DAILY     Dispense:  60 capsule     Refill:  9               Follow-ups after your visit        Your next 10 appointments already scheduled     Jul 12, 2018  6:00 PM CDT   [facility]YS[facility] with Clarence Park MD   [facility]    [address] [place] [id number] [phone]              Who to contact     If you have questions or need follow up information about today's clinic visit or your schedule please contact [facility] directly at [phone].  Normal or non-critical lab and imaging results will be communicated to you by MyChart, letter or phone within 4 business days after the clinic has received the results. If you do not hear from us within 7 days, please contact the clinic through MyChart or phone. If you have a critical or abnormal lab result, we will notify you by phone as soon as possible.  Submit refill requests through [facility] or call your pharmacy and they will forward the refill request to us. Please allow 3 business days for your refill  "to be completed.          Additional Information About Your Visit        Care EveryWhere ID     This is your Care EveryWhere ID. This could be used by other organizations to access your [place] medical records  [id number]        Your Vitals Were     Pulse Temperature Height Pulse Oximetry BMI (Body Mass Index)       66 98.4  F (36.9  C) (Oral) 5' 10\" (1.778 m) 95% 41.5 kg/m2        Blood Pressure from Last 3 Encounters:   06/28/18 133/87   03/08/18 128/78   12/07/17 142/82    Weight from Last 3 Encounters:   06/28/18 289 lb 4 oz (131.2 kg)   03/08/18 287 lb (130.2 kg)   12/07/17 296 lb (134.3 kg)              We Performed the Following     Basic metabolic panel     CBC with platelets     EKG 12-lead complete w/read - Clinics     HIV Screening     Lipid panel reflex to direct LDL Fasting          Today's Medication Changes          These changes are accurate as of 6/28/18  6:27 PM.  If you have any questions, ask your nurse or doctor.               These medicines have changed or have updated prescriptions.        Dose/Directions    * HYDROcodone-acetaminophen 5-325 MG per tablet   Commonly known as:  NORCO   This may have changed:  Another medication with the same name was changed. Make sure you understand how and when to take each.   Used for:  Back pain with radiation, Traumatic arthritis of ankle, unspecified laterality, Chronic pain due to trauma   Changed by:  Clarence Park MD        Dose:  1 tablet   Take 1 tablet by mouth every 6 hours as needed for pain   Quantity:  90 tablet   Refills:  0       * HYDROcodone-acetaminophen 5-325 MG per tablet   Commonly known as:  NORCO   This may have changed:  These instructions start on 7/27/2018. If you are unsure what to do until then, ask your doctor or other care provider.   Used for:  Back pain with radiation, Traumatic arthritis of ankle, unspecified laterality, Chronic pain due to trauma   Changed by:  Clarence Park MD        Dose:  1 tablet "   Start taking on:  7/27/2018   Take 1 tablet by mouth every 6 hours as needed for pain   Quantity:  90 tablet   Refills:  0       * HYDROcodone-acetaminophen 5-325 MG per tablet   Commonly known as:  NORCO   This may have changed:  These instructions start on 8/27/2018. If you are unsure what to do until then, ask your doctor or other care provider.   Used for:  Traumatic arthritis of ankle, unspecified laterality, Chronic pain due to trauma   Changed by:  Clarence Park MD        Dose:  1 tablet   Start taking on:  8/27/2018   Take 1 tablet by mouth every 6 hours as needed for pain   Quantity:  90 tablet   Refills:  0       * Notice:  This list has 3 medication(s) that are the same as other medications prescribed for you. Read the directions carefully, and ask your doctor or other care provider to review them with you.         Where to get your medicines      These medications were sent to [facility]/pharmacy #[id number]  [place] - [address]  [address] [facility] [address] [facility] [address]     Phone:  [phone]     omeprazole 20 MG CR capsule         Some of these will need a paper prescription and others can be bought over the counter.  Ask your nurse if you have questions.     Bring a paper prescription for each of these medications     HYDROcodone-acetaminophen 5-325 MG per tablet    HYDROcodone-acetaminophen 5-325 MG per tablet    HYDROcodone-acetaminophen 5-325 MG per tablet               Information about OPIOIDS     PRESCRIPTION OPIOIDS: WHAT YOU NEED TO KNOW   We gave you an opioid (narcotic) pain medicine. It is important to manage your pain, but opioids are not always the best choice. You should first try all the other options your care team gave you. Take this medicine for as short a time (and as few doses) as possible.     These medicines have risks:    DO NOT drive when on new or higher doses of pain medicine. These medicines can affect your alertness and reaction times, and you could be  arrested for driving under the influence (DUI). If you need to use opioids long-term, talk to your care team about driving.    DO NOT operate heave machinery    DO NOT do any other dangerous activities while taking these medicines.     DO NOT drink any alcohol while taking these medicines.      If the opioid prescribed includes acetaminophen, DO NOT take with any other medicines that contain acetaminophen. Read all labels carefully. Look for the word  acetaminophen  or  Tylenol.  Ask your pharmacist if you have questions or are unsure.    You can get addicted to pain medicines, especially if you have a history of addiction (chemical, alcohol or substance dependence). Talk to your care team about ways to reduce this risk.    Store your pills in a secure place, locked if possible. We will not replace any lost or stolen medicine. If you don t finish your medicine, please throw away (dispose) as directed by your pharmacist. The [place] Pollution Control Agency has more information about safe disposal: https://www.pca.[facility].[place].us/living-green/managing-unwanted-medications.     All opioids tend to cause constipation. Drink plenty of water and eat foods that have a lot of fiber, such as fruits, vegetables, prune juice, apple juice and high-fiber cereal. Take a laxative (Miralax, milk of magnesia, Colace, Senna) if you don t move your bowels at least every other day.          Primary Care Provider Office Phone # Fax #    Clarence Park [phone]       [address] [place] [address]        Equal Access to Services     JASMIN ZEE : Hadii jessica Mishra, waaxda lumare, qaybta kaalmada tom, humza trujillo. So [facility] [phone].    ATENCIÓN: Si habla español, tiene a lovett disposición servicios gratuitos de asistencia lingüística. Llame al [phone].    We comply with applicable federal civil rights laws and [place] laws. We do not  discriminate on the basis of race, color, national origin, age, disability, sex, sexual orientation, or gender identity.            Thank you!     Thank you for choosing [facility]  for your care. Our goal is always to provide you with excellent care. Hearing back from our patients is one way we can continue to improve our services. Please take a few minutes to complete the written survey that you may receive in the mail after your visit with us. Thank you!             Your Updated Medication List - Protect others around you: Learn how to safely use, store and throw away your medicines at www.disposemymeds.org.          This list is accurate as of 6/28/18  6:27 PM.  Always use your most recent med list.                   Brand Name Dispense Instructions for use Diagnosis    acyclovir 400 MG tablet    ZOVIRAX    30 tablet    Take 1 tablet (400 mg) by mouth 3 times daily    Recurrent cold sores       cyclobenzaprine 10 MG tablet    FLEXERIL    90 tablet    Take 1 tablet (10 mg) by mouth 3 times daily as needed for muscle spasms    Back pain with radiation       * HYDROcodone-acetaminophen 5-325 MG per tablet    NORCO    90 tablet    Take 1 tablet by mouth every 6 hours as needed for pain    Back pain with radiation, Traumatic arthritis of ankle, unspecified laterality, Chronic pain due to trauma       * HYDROcodone-acetaminophen 5-325 MG per tablet   Start taking on:  7/27/2018    NORCO    90 tablet    Take 1 tablet by mouth every 6 hours as needed for pain    Back pain with radiation, Traumatic arthritis of ankle, unspecified laterality, Chronic pain due to trauma       * HYDROcodone-acetaminophen 5-325 MG per tablet   Start taking on:  8/27/2018    NORCO    90 tablet    Take 1 tablet by mouth every 6 hours as needed for pain    Traumatic arthritis of ankle, unspecified laterality, Chronic pain due to trauma       ibuprofen 800 MG tablet    ADVIL/MOTRIN    100 tablet    TAKE 1 TABLET BY MOUTH 3  TIMES DAILY AS NEEDED FOR PAIN    Traumatic arthritis of ankle, right       metoprolol succinate 50 MG 24 hr tablet    TOPROL-XL    90 tablet    Take 1 tablet (50 mg) by mouth daily    Essential hypertension with goal blood pressure less than 140/90       * omeprazole 20 MG CR capsule    priLOSEC    60 capsule    Take 1 capsule (20 mg) by mouth 2 times daily    Gastroesophageal reflux disease with esophagitis       * omeprazole 20 MG CR capsule    priLOSEC    60 capsule    TAKE 1 CAPSULE (20 MG) BY MOUTH 2 TIMES DAILY    Gastroesophageal reflux disease with esophagitis       * Notice:  This list has 5 medication(s) that are the same as other medications prescribed for you. Read the directions carefully, and ask your doctor or other care provider to review them with you.

## 2018-06-29 LAB
ANION GAP SERPL CALCULATED.3IONS-SCNC: 11 MMOL/L (ref 3–14)
BUN SERPL-MCNC: 13 MG/DL (ref 7–30)
CALCIUM SERPL-MCNC: 8.9 MG/DL (ref 8.5–10.1)
CHLORIDE SERPL-SCNC: 106 MMOL/L (ref 94–109)
CHOLEST SERPL-MCNC: 211 MG/DL
CO2 SERPL-SCNC: 23 MMOL/L (ref 20–32)
CREAT SERPL-MCNC: 0.8 MG/DL (ref 0.66–1.25)
GFR SERPL CREATININE-BSD FRML MDRD: >90 ML/MIN/1.7M2
GLUCOSE SERPL-MCNC: 77 MG/DL (ref 70–99)
HDLC SERPL-MCNC: 46 MG/DL
HIV 1+2 AB+HIV1 P24 AG SERPL QL IA: NONREACTIVE
LDLC SERPL CALC-MCNC: 145 MG/DL
NONHDLC SERPL-MCNC: 165 MG/DL
POTASSIUM SERPL-SCNC: 3.8 MMOL/L (ref 3.4–5.3)
SODIUM SERPL-SCNC: 140 MMOL/L (ref 133–144)
TRIGL SERPL-MCNC: 101 MG/DL

## 2018-06-29 NOTE — NURSING NOTE
Faxed PreOp to [facility]/Ortho, attn: Dr. Darling  Fax #[phone]    Ginger Aguilar, CMA

## 2018-07-12 ENCOUNTER — OFFICE VISIT (OUTPATIENT)
Dept: FAMILY MEDICINE | Facility: CLINIC | Age: 48
End: 2018-07-12
Payer: COMMERCIAL

## 2018-07-12 VITALS
DIASTOLIC BLOOD PRESSURE: 89 MMHG | HEART RATE: 84 BPM | BODY MASS INDEX: 40.8 KG/M2 | WEIGHT: 285 LBS | TEMPERATURE: 98.2 F | HEIGHT: 70 IN | SYSTOLIC BLOOD PRESSURE: 136 MMHG

## 2018-07-12 DIAGNOSIS — E78.5 HYPERLIPIDEMIA WITH TARGET LDL LESS THAN 130: ICD-10-CM

## 2018-07-12 DIAGNOSIS — D64.9 ANEMIA, UNSPECIFIED TYPE: ICD-10-CM

## 2018-07-12 DIAGNOSIS — E66.3 OVERWEIGHT: ICD-10-CM

## 2018-07-12 DIAGNOSIS — Z00.00 ROUTINE HISTORY AND PHYSICAL EXAMINATION OF ADULT: Primary | ICD-10-CM

## 2018-07-12 PROCEDURE — 99396 PREV VISIT EST AGE 40-64: CPT | Performed by: FAMILY MEDICINE

## 2018-07-12 ASSESSMENT — PATIENT HEALTH QUESTIONNAIRE - PHQ9: 5. POOR APPETITE OR OVEREATING: NOT AT ALL

## 2018-07-12 ASSESSMENT — ANXIETY QUESTIONNAIRES
2. NOT BEING ABLE TO STOP OR CONTROL WORRYING: SEVERAL DAYS
GAD7 TOTAL SCORE: 2
3. WORRYING TOO MUCH ABOUT DIFFERENT THINGS: NOT AT ALL
7. FEELING AFRAID AS IF SOMETHING AWFUL MIGHT HAPPEN: NOT AT ALL
5. BEING SO RESTLESS THAT IT IS HARD TO SIT STILL: NOT AT ALL
1. FEELING NERVOUS, ANXIOUS, OR ON EDGE: NOT AT ALL
6. BECOMING EASILY ANNOYED OR IRRITABLE: SEVERAL DAYS

## 2018-07-12 NOTE — PROGRESS NOTES
SUBJECTIVE:   CC: Rakesh Carey is an 48 year old male who presents for preventative health visit.     Physical   Annual:     Getting at least 3 servings of Calcium per day:  Yes    Bi-annual eye exam:  Yes    Dental care twice a year:  Yes    Sleep apnea or symptoms of sleep apnea:  None    Diet:  Low salt and Carbohydrate counting    Frequency of exercise:  None    Taking medications regularly:  Yes    Medication side effects:  None    Additional concerns today:  No    Difficulty hearing when there's background noise.     Prostate cancer screening. Reviewed recommendations for prostate cancer screening. No incontinence, hesitancy, or nocturia. No family history of prostate cancer.     Reviewed recent labs with patient. Lipid panel shows LDL at 145 and an HDL at 46. Both BMP and CBC were unremarkable with the exception of low hgb. He denies melanic stools, acid reflux symptoms, or hematochezia.      PHQ- 9 score of 1    THOMAS-7 score of 2      Today's PHQ-2 Score:   PHQ-2 ( 1999 [employer]) 7/12/2018   Q1: Little interest or pleasure in doing things 0   Q2: Feeling down, depressed or hopeless 0   PHQ-2 Score 0   Q1: Little interest or pleasure in doing things Not at all   Q2: Feeling down, depressed or hopeless Not at all   PHQ-2 Score 0       Abuse: Current or Past(Physical, Sexual or Emotional)- No  Do you feel safe in your environment - Yes    Social History   Substance Use Topics     Smoking status: Former Smoker     Packs/day: 0.25     Years: 20.00     Types: Cigarettes     Quit date: 5/14/2013     Smokeless tobacco: Never Used      Comment: <1/2 ppd     Alcohol use Yes      Comment: 5 glasses of wine on weekend     Alcohol Use 7/12/2018   If you drink alcohol do you typically have greater than 3 drinks per day OR greater than 7 drinks per week? No   No flowsheet data found.    Last PSA: No results found for: PSA    Reviewed orders with patient. Reviewed health maintenance and updated orders accordingly -  Yes  Labs reviewed in EPIC  BP Readings from Last 3 Encounters:   07/12/18 136/89   06/28/18 133/87   03/08/18 128/78    Wt Readings from Last 3 Encounters:   07/12/18 129.3 kg (285 lb)   06/28/18 131.2 kg (289 lb 4 oz)   03/08/18 130.2 kg (287 lb)                  Patient Active Problem List   Diagnosis     Vitamin D deficiency     Diverticulosis of colon     GERD (gastroesophageal reflux disease)     BMI 40.0-44.9, adult (H)     Hypertension goal BP (blood pressure) < 140/90     Hyperlipidemia with target LDL less than 130     Chronic pain     Chronic pain due to trauma     Traumatic arthritis of ankle, right     Overweigiht BMI>40     Past Surgical History:   Procedure Laterality Date     SURGICAL HISTORY OF -       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 No family hx of      Diabetes No family hx of      Hypertension No family hx of      Cerebrovascular Disease No family hx of      Breast Cancer No family hx of          Current Outpatient Prescriptions   Medication Sig Dispense Refill     acyclovir (ZOVIRAX) 400 MG tablet Take 1 tablet (400 mg) by mouth 3 times daily 30 tablet 5     cyclobenzaprine (FLEXERIL) 10 MG tablet Take 1 tablet (10 mg) by mouth 3 times daily as needed for muscle spasms 90 tablet 3     [START ON 8/27/2018] HYDROcodone-acetaminophen (NORCO) 5-325 MG per tablet Take 1 tablet by mouth every 6 hours as needed for pain 90 tablet 0     [START ON 7/27/2018] HYDROcodone-acetaminophen (NORCO) 5-325 MG per tablet Take 1 tablet by mouth every 6 hours  as needed for pain 90 tablet 0     HYDROcodone-acetaminophen (NORCO) 5-325 MG per tablet Take 1 tablet by mouth every 6 hours as needed for pain 90 tablet 0     ibuprofen (ADVIL/MOTRIN) 800 MG tablet TAKE 1 TABLET BY MOUTH 3 TIMES DAILY AS NEEDED FOR PAIN 100 tablet 6     metoprolol succinate (TOPROL-XL) 50 MG 24 hr tablet Take 1 tablet (50 mg) by mouth daily 90 tablet 3     omeprazole (PRILOSEC) 20 MG CR capsule TAKE 1 CAPSULE (20 MG) BY MOUTH 2 TIMES DAILY 60 capsule 9     No Known Allergies  Recent Labs   Lab Test  06/28/18   1829  07/06/17   1826  06/30/16   1833   05/23/11   1738  05/07/10   0723   A1C   --    --    --    --   5.6  5.7   LDL  145*  147*  149*   < >  131*  154*   HDL  46  46  42   < >  47  29*   TRIG  101  152*  78   < >  285*  131   ALT   --    --    --    --   35   --    CR  0.80  0.88  0.73   < >  0.92   --    GFRESTIMATED  >90  >90  Non African American GFR Calc    >90  Non  GFR Calc     < >  >90   --    GFRESTBLACK  >90  >90  African American GFR Calc    >90   GFR Calc     < >  >90   --    POTASSIUM  3.8  3.9  4.0   < >  4.0   --    TSH   --    --    --    --   2.67  1.21    < > = values in this interval not displayed.        Reviewed and updated as needed this visit by clinical staff  Tobacco  Allergies  Meds  Med Hx  Surg Hx  Fam Hx  Soc Hx        Reviewed and updated as needed this visit by Provider            Review of Systems  CONSTITUTIONAL: NEGATIVE for fever, chills, change in weight  INTEGUMENTARY/SKIN: NEGATIVE for worrisome rashes, moles or lesions  EYES: NEGATIVE for vision changes or irritation  ENT: NEGATIVE for ear, mouth and throat problems  RESP: NEGATIVE for significant cough or SOB  CV: NEGATIVE for chest pain, palpitations or peripheral edema  GI: NEGATIVE for nausea, abdominal pain, heartburn, or change in bowel habits   male: negative for dysuria, hematuria, decreased urinary stream, erectile dysfunction, urethral  "discharge  MUSCULOSKELETAL: NEGATIVE for significant arthralgias or myalgia  NEURO: NEGATIVE for weakness, dizziness or paresthesias  PSYCHIATRIC: NEGATIVE for changes in mood or affect    This document serves as a record of the services and decisions personally performed and made by Chao Park MD. It was created on their behalf by Mauricio Brownlee, a trained medical scribe. The creation of this document is based the provider's statements to the medical scribe.  Mauricio Brownlee July 12, 2018 7:00 PM       OBJECTIVE:   /89 (BP Location: Right arm, Cuff Size: Adult Large)  Pulse 84  Temp 98.2  F (36.8  C) (Oral)  Ht 1.778 m (5' 10\")  Wt 129.3 kg (285 lb)  BMI 40.89 kg/m2    Physical Exam  GENERAL: overweight alert and no distress  EYES: Eyes grossly normal to inspection, PERRL and conjunctivae and sclerae normal  HENT: ear canals and TM's normal, nose and mouth without ulcers or lesions  NECK: no adenopathy, no asymmetry, masses, or scars and thyroid normal to palpation  RESP: lungs clear to auscultation - no rales, rhonchi or wheezes  CV: regular rate and rhythm, normal S1 S2, no S3 or S4, no murmur, click or rub, no peripheral edema and peripheral pulses strong  ABDOMEN: soft, nontender, no hepatosplenomegaly, no masses and bowel sounds normal  MS: no gross musculoskeletal defects noted, no edema  SKIN: no suspicious lesions or rashes  NEURO: Normal strength and tone, mentation intact and speech normal  PSYCH: mentation appears normal, affect normal/bright  LYMPH: no cervical, supraclavicular, axillary, or inguinal adenopathy    Diagnostic Test Results:  none     ASSESSMENT/PLAN:   (Z00.00) Routine history and physical examination of adult  (primary encounter diagnosis)  Comment: screening recommendations related to health maintenance were reviewed. We'll start screening for both colon cancer and prostate cancer in two years in accordance with general guidelines.   Plan: see below    (D64.9) Anemia, unspecified " "type  Comment: Mild anemia with hgb at 12.7. No signs of overt GI bleed. Colonoscopy in 2010 was unremarkable. EGD two years ago did show gallo's esophagus. With his fast-approaching knee surgery, [facility] in two [facility].  Plan: recheck CBC in two months    (E78.5) Hyperlipidemia with target LDL less than 130  Comment: LDL at 145. Long discussion related to healthy diet and non-medication strategies to decrease cholesterol. Counseling done on eating high fiber foods, eating less saturated/trans fatty acids.   Plan: robust modifications of dietary habits    (E66.3) Overweight  Comment: reminded patient of active curtailment of caloric intake and aerobic physical exercise.   Plan: robust lifestyle modifications.       COUNSELING:   Reviewed preventive health counseling, as reflected in patient instructions       Regular exercise       Healthy diet/nutrition       Colon cancer screening       Prostate cancer screening    BP Readings from Last 1 Encounters:   07/12/18 136/89     Estimated body mass index is 40.89 kg/(m^2) as calculated from the following:    Height as of this encounter: 1.778 m (5' 10\").    Weight as of this encounter: 129.3 kg (285 lb).    BP Screening:   Last 3 BP Readings:    BP Readings from Last 3 Encounters:   07/12/18 136/89   06/28/18 133/87   03/08/18 128/78       The following was recommended to the patient:  Re-screen BP within a year and recommended lifestyle modifications  Weight management plan: Discussed healthy diet and exercise guidelines and patient will follow up in 6 months in clinic to re-evaluate.     reports that he quit smoking about 5 years ago. His smoking use included Cigarettes. He has a 5.00 pack-year smoking history. He has never used smokeless tobacco.      Counseling Resources:  ATP IV Guidelines  Pooled Cohorts Equation Calculator  FRAX Risk Assessment  ICSI Preventive Guidelines  Dietary Guidelines for Americans, " 2010  [employer]'s MyPlate  ASA Prophylaxis  Lung CA Screening    Clarence Park MD, MD  [facility]  Answers for HPI/ROS submitted by the patient on 7/12/2018   PHQ-2 Score: 0

## 2018-07-12 NOTE — MR AVS SNAPSHOT
"              After Visit Summary   7/12/2018    Rakesh Carey    MRN: [id number]           Patient Information     Date Of Birth          1970        Visit Information        Provider Department      7/12/2018 6:00 PM Clarence Park MD [facility]        Care Instructions      Preventive Health Recommendations  Male Ages 40 to 49    Yearly exam:             See your health care provider every year in order to  o   Review health changes.   o   Discuss preventive care.    o   Review your medicines if your doctor has prescribed any.    You should be tested each year for STDs (sexually transmitted diseases) if you re at risk.     Have a cholesterol test every 5 years.     Have a colonoscopy (test for colon cancer) if someone in your family has had colon cancer or polyps before age 50.     After age 45, have a diabetes test (fasting glucose). If you are at risk for diabetes, you should have this test every 3 years.      Talk with your health care provider about whether or not a prostate cancer screening test (PSA) is right for you.    Shots: Get a flu shot each year. Get a tetanus shot every 10 years.     Nutrition:    Eat at least 5 servings of fruits and vegetables daily.     Eat whole-grain bread, whole-wheat pasta and brown rice instead of white grains and rice.     Get adequate Calcium and Vitamin D.     Lifestyle    Exercise for at least 150 minutes a week (30 minutes a day, 5 days a week). This will help you control your weight and prevent disease.     Limit alcohol to one drink per day.     No smoking.     Wear sunscreen to prevent skin cancer.     See your dentist every six months for an exam and cleaning.      Ways to improve your cholesterol...    1- Eats less saturated fats (including avoiding \"trans\" fats).    2 - Eat more unsaturated fats  - found in vegetables, grains, and tree nuts.   Also by replacing butter with canola oil or olive oil.    3 - Eat more nuts.   1-2 " "ounces (a small handful) of almonds, walnuts, hazelnuts or pecans once a  day in place of other less healthy snacks.    4 - Eat more high fiber foods - vegetables and whole grains including oat bran, oats, beans, peas, and flax seed.    5 - Eat more fish - such as salmon, tuna, mackerel, and sardines.  1 or 2 six ounce servings per week is a healthy replacement for other proteins.    6 - Exercise for at least 120 minutes per week - which is equal to 30 minutes 4 days per week.      -book: reverse and prevent heart disease    -follow up in 2 months for recheck of hemoglobin           Follow-ups after your visit        Follow-up notes from your care team     Return in about 2 months (around 9/12/2018) for recheck of hemoglobin .      Who to contact     If you have questions or need follow up information about today's clinic visit or your schedule please contact [facility] directly at [phone].  Normal or non-critical lab and imaging results will be communicated to you by MyChart, letter or phone within 4 business days after the clinic has received the results. If you do not hear from us within 7 days, please contact the clinic through MyChart or phone. If you have a critical or abnormal lab result, we will notify you by phone as soon as possible.  Submit refill requests through [employer] or call your pharmacy and they will forward the refill request to us. Please allow 3 business days for your refill to be completed.          Additional Information About Your Visit        Care EveryWhere ID     This is your Care EveryWhere ID. This could be used by other organizations to access your [facility] medical records  [id number]        Your Vitals Were     Pulse Temperature Height BMI (Body Mass Index)          84 98.2  F (36.8  C) (Oral) 5' 10\" (1.778 m) 40.89 kg/m2         Blood Pressure from Last 3 Encounters:   07/12/18 136/89   06/28/18 133/87   03/08/18 128/78    Weight from Last 3 Encounters: "   07/12/18 285 lb (129.3 kg)   06/28/18 289 lb 4 oz (131.2 kg)   03/08/18 287 lb (130.2 kg)              Today, you had the following     No orders found for display       Primary Care Provider Office Phone # Fax #    Clarence Park [phone]       [address] [facility] [address]        Equal Access to Services     JASMIN ZEE : Hadii aad ku hadasho Soomaali, waaxda luqadaha, qaybta kaalmada adeegyada, waxay idiin hayaan adeeg khcarsonsh laaleisha . So [facility] [phone].    ATENCIÓN: Si sandi ro, tiene a lovett disposición servicios gratuitos de asistencia lingüística. Llame al [phone].    We comply with applicable federal civil rights laws and [place] laws. We do not discriminate on the basis of race, color, national origin, age, disability, sex, sexual orientation, or gender identity.            Thank you!     Thank you for choosing [facility]  for your care. Our goal is always to provide you with excellent care. Hearing back from our patients is one way we can continue to improve our services. Please take a few minutes to complete the written survey that you may receive in the mail after your visit with us. Thank you!             Your Updated Medication List - Protect others around you: Learn how to safely use, store and throw away your medicines at www.disposemymeds.org.          This list is accurate as of 7/12/18  6:55 PM.  Always use your most recent med list.                   Brand Name Dispense Instructions for use Diagnosis    acyclovir 400 MG tablet    ZOVIRAX    30 tablet    Take 1 tablet (400 mg) by mouth 3 times daily    Recurrent cold sores       cyclobenzaprine 10 MG tablet    FLEXERIL    90 tablet    Take 1 tablet (10 mg) by mouth 3 times daily as needed for muscle spasms    Back pain with radiation       * HYDROcodone-acetaminophen 5-325 MG per tablet    NORCO    90 tablet    Take 1 tablet by mouth every 6 hours as needed for pain    Back  pain with radiation, Traumatic arthritis of ankle, unspecified laterality, Chronic pain due to trauma       * HYDROcodone-acetaminophen 5-325 MG per tablet   Start taking on:  7/27/2018    NORCO    90 tablet    Take 1 tablet by mouth every 6 hours as needed for pain    Back pain with radiation, Traumatic arthritis of ankle, unspecified laterality, Chronic pain due to trauma       * HYDROcodone-acetaminophen 5-325 MG per tablet   Start taking on:  8/27/2018    NORCO    90 tablet    Take 1 tablet by mouth every 6 hours as needed for pain    Traumatic arthritis of ankle, unspecified laterality, Chronic pain due to trauma       ibuprofen 800 MG tablet    ADVIL/MOTRIN    100 tablet    TAKE 1 TABLET BY MOUTH 3 TIMES DAILY AS NEEDED FOR PAIN    Traumatic arthritis of ankle, right       metoprolol succinate 50 MG 24 hr tablet    TOPROL-XL    90 tablet    Take 1 tablet (50 mg) by mouth daily    Essential hypertension with goal blood pressure less than 140/90       omeprazole 20 MG CR capsule    priLOSEC    60 capsule    TAKE 1 CAPSULE (20 MG) BY MOUTH 2 TIMES DAILY    Gastroesophageal reflux disease with esophagitis       * Notice:  This list has 3 medication(s) that are the same as other medications prescribed for you. Read the directions carefully, and ask your doctor or other care provider to review them with you.

## 2018-07-12 NOTE — PATIENT INSTRUCTIONS
"  Preventive Health Recommendations  Male Ages 40 to 49    Yearly exam:             See your health care provider every year in order to  o   Review health changes.   o   Discuss preventive care.    o   Review your medicines if your doctor has prescribed any.    You should be tested each year for STDs (sexually transmitted diseases) if you re at risk.     Have a cholesterol test every 5 years.     Have a colonoscopy (test for colon cancer) if someone in your family has had colon cancer or polyps before age 50.     After age 45, have a diabetes test (fasting glucose). If you are at risk for diabetes, you should have this test every 3 years.      Talk with your health care provider about whether or not a prostate cancer screening test (PSA) is right for you.    Shots: Get a flu shot each year. Get a tetanus shot every 10 years.     Nutrition:    Eat at least 5 servings of fruits and vegetables daily.     Eat whole-grain bread, whole-wheat pasta and brown rice instead of white grains and rice.     Get adequate Calcium and Vitamin D.     Lifestyle    Exercise for at least 150 minutes a week (30 minutes a day, 5 days a week). This will help you control your weight and prevent disease.     Limit alcohol to one drink per day.     No smoking.     Wear sunscreen to prevent skin cancer.     See your dentist every six months for an exam and cleaning.      Ways to improve your cholesterol...    1- Eats less saturated fats (including avoiding \"trans\" fats).    2 - Eat more unsaturated fats  - found in vegetables, grains, and tree nuts.   Also by replacing butter with canola oil or olive oil.    3 - Eat more nuts.   1-2 ounces (a small handful) of almonds, walnuts, hazelnuts or pecans once a  day in place of other less healthy snacks.    4 - Eat more high fiber foods - vegetables and whole grains including oat bran, oats, beans, peas, and flax seed.    5 - Eat more fish - such as salmon, tuna, mackerel, and sardines.  1 or 2 six " ounce servings per week is a healthy replacement for other proteins.    6 - Exercise for at least 120 minutes per week - which is equal to 30 minutes 4 days per week.      -book: reverse and prevent heart disease    -follow up in 2 months for recheck of hemoglobin

## 2018-07-13 ASSESSMENT — ANXIETY QUESTIONNAIRES: GAD7 TOTAL SCORE: 2

## 2018-07-13 ASSESSMENT — PATIENT HEALTH QUESTIONNAIRE - PHQ9: SUM OF ALL RESPONSES TO PHQ QUESTIONS 1-9: 1

## 2018-07-20 ENCOUNTER — HOSPITAL ENCOUNTER (EMERGENCY)
Facility: CLINIC | Age: 48
Discharge: HOME | End: 2018-07-20
Attending: EMERGENCY MEDICINE | Admitting: EMERGENCY MEDICINE
Payer: COMMERCIAL

## 2018-07-20 ENCOUNTER — APPOINTMENT (OUTPATIENT)
Dept: ULTRASOUND IMAGING | Facility: CLINIC | Age: 48
End: 2018-07-20
Attending: EMERGENCY MEDICINE
Payer: COMMERCIAL

## 2018-07-20 VITALS
TEMPERATURE: 99.5 F | SYSTOLIC BLOOD PRESSURE: 137 MMHG | DIASTOLIC BLOOD PRESSURE: 82 MMHG | WEIGHT: 285 LBS | BODY MASS INDEX: 40.8 KG/M2 | OXYGEN SATURATION: 93 % | HEIGHT: 70 IN | RESPIRATION RATE: 16 BRPM

## 2018-07-20 DIAGNOSIS — M79.661 RIGHT CALF PAIN: ICD-10-CM

## 2018-07-20 PROCEDURE — 99284 EMERGENCY DEPT VISIT MOD MDM: CPT | Mod: 25

## 2018-07-20 PROCEDURE — 25000132 ZZH RX MED GY IP 250 OP 250 PS 637: Performed by: EMERGENCY MEDICINE

## 2018-07-20 PROCEDURE — 93971 EXTREMITY STUDY: CPT | Mod: RT

## 2018-07-20 RX ORDER — OXYCODONE HYDROCHLORIDE 5 MG/1
5 TABLET ORAL ONCE
Status: COMPLETED | OUTPATIENT
Start: 2018-07-20 | End: 2018-07-20

## 2018-07-20 RX ADMIN — OXYCODONE HYDROCHLORIDE 5 MG: 5 TABLET ORAL at 12:43

## 2018-07-20 ASSESSMENT — ENCOUNTER SYMPTOMS
SHORTNESS OF BREATH: 0
NEUROLOGICAL NEGATIVE: 1

## 2018-07-20 NOTE — ED NOTES
Bed: ED02  Expected date: 7/20/18  Expected time: 12:05 PM  Means of arrival:   Comments:  Triage

## 2018-07-20 NOTE — ED AVS SNAPSHOT
Emergency Department    [address] [place] [address]    Phone:  [phone]    Fax:  [phone]                                       Rakesh Carey   MRN: [id number]    Department:   Emergency Department   Date of Visit:  7/20/2018           Patient Information     Date Of Birth          1970        Your diagnoses for this visit were:     Right calf pain        You were seen by Barb Adam MD.      Follow-up Information     Follow up with Clarence Park MD.    Specialty:  Family Practice    Contact information:    [address] [facility] [address]  [phone]          Discharge Instructions       Elevate, ice, follow-up with your doctor next week if you continue to have calf pain and or if the swelling gets worse.        Your next 10 appointments already scheduled     Sep 06, 2018  6:00 PM CDT   SHORT with Clarence Park MD   [facility]    [address] [place] [address] [phone]              24 Hour Appointment Hotline       To make an appointment at any [facility], call [phone] ([phone]). If you don't have a family doctor or clinic, we will help you find one. [facility] are conveniently located to serve the needs of you and your family.             Review of your medicines      Our records show that you are taking the medicines listed below. If these are incorrect, please call your family doctor or clinic.        Dose / Directions Last dose taken    acyclovir 400 MG tablet   Commonly known as:  ZOVIRAX   Dose:  400 mg   Quantity:  30 tablet        Take 1 tablet (400 mg) by mouth 3 times daily   Refills:  5        cyclobenzaprine 10 MG tablet   Commonly known as:  FLEXERIL   Dose:  10 mg   Quantity:  90 tablet        Take 1 tablet (10 mg) by mouth 3 times daily as needed for muscle spasms   Refills:  3        * HYDROcodone-acetaminophen 5-325 MG per  tablet   Commonly known as:  NORCO   Dose:  1 tablet   Quantity:  90 tablet        Take 1 tablet by mouth every 6 hours as needed for pain   Refills:  0        * HYDROcodone-acetaminophen 5-325 MG per tablet   Commonly known as:  NORCO   Dose:  1 tablet   Quantity:  90 tablet   Start taking on:  7/27/2018        Take 1 tablet by mouth every 6 hours as needed for pain   Refills:  0        * HYDROcodone-acetaminophen 5-325 MG per tablet   Commonly known as:  NORCO   Dose:  1 tablet   Quantity:  90 tablet   Start taking on:  8/27/2018        Take 1 tablet by mouth every 6 hours as needed for pain   Refills:  0        HYDROXYZINE HCL PO   Dose:  25 mg        Take 25 mg by mouth every 6 hours as needed for itching   Refills:  0        ibuprofen 800 MG tablet   Commonly known as:  ADVIL/MOTRIN   Quantity:  100 tablet        TAKE 1 TABLET BY MOUTH 3 TIMES DAILY AS NEEDED FOR PAIN   Refills:  6        metoprolol succinate 50 MG 24 hr tablet   Commonly known as:  TOPROL-XL   Dose:  50 mg   Quantity:  90 tablet        Take 1 tablet (50 mg) by mouth daily   Refills:  3        omeprazole 20 MG CR capsule   Commonly known as:  priLOSEC   Quantity:  60 capsule        TAKE 1 CAPSULE (20 MG) BY MOUTH 2 TIMES DAILY   Refills:  9        * Notice:  This list has 3 medication(s) that are the same as other medications prescribed for you. Read the directions carefully, and ask your doctor or other care provider to review them with you.            Procedures and tests performed during your visit     US Lower Extremity Venous Duplex Right      Orders Needing Specimen Collection     None      Pending Results     No orders found from 7/18/2018 to 7/21/2018.            Pending Culture Results     No orders found from 7/18/2018 to 7/21/2018.            Pending Results Instructions     If you had any lab results that were not finalized at the time of your Discharge, you can call the ED Lab Result RN at [phone]. You will be contacted by  this team for any positive Lab results or changes in treatment. The nurses are available 7 days a week from 10A to 6:30P.  You can leave a message 24 hours per day and they will return your call.        Test Results From Your Hospital Stay        7/20/2018  1:02 PM      Narrative     US LOWER EXTREMITY VENOUS DUPLEX RIGHT   7/20/2018 12:59 PM     HISTORY: Right calf pain and swelling.    COMPARISON: None.    FINDINGS: Gray-scale, color and Doppler spectral analysis ultrasound  was performed of the right leg. Compression and augmentation imaging  was performed.    There is no evidence for deep venous thrombosis. The veins compress  and augment normally.        Impression     IMPRESSION: No DVT.    JULIA HAYES MD                Clinical Quality Measure: Blood Pressure Screening     Your blood pressure was checked while you were in the emergency department today. The last reading we obtained was  BP: 137/82 . Please read the guidelines below about what these numbers mean and what you should do about them.  If your systolic blood pressure (the top number) is less than 120 and your diastolic blood pressure (the bottom number) is less than 80, then your blood pressure is normal. There is nothing more that you need to do about it.  If your systolic blood pressure (the top number) is 120-139 or your diastolic blood pressure (the bottom number) is 80-89, your blood pressure may be higher than it should be. You should have your blood pressure rechecked within a year by a primary care provider.  If your systolic blood pressure (the top number) is 140 or greater or your diastolic blood pressure (the bottom number) is 90 or greater, you may have high blood pressure. High blood pressure is treatable, but if left untreated over time it can put you at risk for heart attack, stroke, or kidney failure. You should have your blood pressure rechecked by a primary care provider within the next 4 weeks.  If your provider in the  emergency department today gave you specific instructions to follow-up with your doctor or provider even sooner than that, you should follow that instruction and not wait for up to 4 weeks for your follow-up visit.        Thank you for choosing [facility]       Thank you for choosing [facility] for your care. Our goal is always to provide you with excellent care. Hearing back from our patients is one way we can continue to improve our services. Please take a few minutes to complete the written survey that you may receive in the mail after you visit with us. Thank you!        Care EveryWhere ID     This is your Care EveryWhere ID. This could be used by other organizations to access your [facility] medical records  [id number]        Equal Access to Services     JASMIN ZEE : Genaro Mishra, clay donovan, nathalie santos, humza trujillo. So [facility] [phone].    ATENCIÓN: Si habla español, tiene a lovett disposición servicios gratuitos de asistencia lingüística. Ll[facility] [phone].    We comply with applicable federal civil rights laws and [place] laws. We do not discriminate on the basis of race, color, national origin, age, disability, sex, sexual orientation, or gender identity.            After Visit Summary       This is your record. Keep this with you and show to your community pharmacist(s) and doctor(s) at your next visit.

## 2018-07-20 NOTE — ED PROVIDER NOTES
"  History     Chief Complaint:  Leg pain       HPI   Rakesh Carey is a 48 year old male who presents for evaluation of leg pain. The patient recently had right ankle fusion two days ago at [facility], returned yesterday for recasting after some post-op bleeding, and then returned again today to [facility] for another recasting due to ongoing right calf pain and swelling. They were concerned and referred him here for DVT rule out. No left-sided symptoms, chest pain, dyspnea, fevers, or any other acute symptoms. He took Norco x1 at home around 0800 this morning for pain.      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 No pertinent family history.      Social History:  Former smoker, 5pack year history, quit 2013.  Marital Status:   [2]  Social drinker.        Review of Systems   Respiratory: Negative for shortness of breath.    Cardiovascular: Positive for leg swelling. Negative for chest pain.   Neurological: Negative.    All other systems reviewed and are negative.      Physical Exam     Patient Vitals for the past 24 hrs:   BP Temp Temp src Heart Rate Resp SpO2 Height Weight   07/20/18 1230 137/82 - - - - 93 % - -   07/20/18 1213 154/89 99.5  F (37.5  C) Oral 93 16 95 % 1.778 m (5' 10\") 129.3 kg (285 lb)        Physical Exam  Nursing note and vitals reviewed.    Constitutional:  Appears well-developed and well-nourished, comfortable. "    Cardiovascular:  Normal rate, regular rhythm.     Peripheral pulse 2+ in bilateral lower extremities.  Cap refill less than 2 seconds.  Musculoskeletal:  Dressing in situ to right lower leg (not undressed, but appears clean/dry/intact). Mild swelling of the right leg below the knee. Tenderness over the medial thigh and calf. Neurovascularly intact distally.   Neurological:   Alert and oriented. Exhibits good muscle tone.      Sensation in the affected extremity normal.     GCS eye subscore is 4. GCS verbal subscore is 5.      GCS motor subscore is 6.   Skin:    Skin is warm and dry. No rash noted. No bruising.     No erythema. No pallor.  No lesions.   Psychiatric:   Behavior is normal. Appropriate mood and affect.     Judgment and thought content normal.         Emergency Department Course   Imaging:  Radiographic findings were communicated with the patient who voiced understanding of the findings.  US Venous Duplex right lower extremity:  No DVT.  Results per Radiology.      Interventions:  Oxycodone 5mg, PO    Emergency Department Course:  Past medical records, nursing notes, and vitals reviewed.   1235: I performed an exam of the patient as documented above.   The above imaging study was obtained. Results above.    1309: I rechecked patient.    I discussed the treatment plan with the patient, who expressed understanding of this plan and consented to discharge. He will be discharged home with instructions for care and follow up. In addition, the patient will return to the emergency department if symptoms persist, worsen, if new symptoms arise or if there is any concern.  All questions were answered.      Impression & Plan      Medical Decision Making:  Rakesh Carey is a 48 year old male who presents today with calf tenderness and pain concerning for a possible DVT.  Ultrasound showed no evidence of DVT.  The patient is otherwise low risk for blood clots and vital signs are stable and I see no evidence of  cellulitis, myositis, arterial or vascular insufficiency.  I feel the patient is stable for discharge.  The patient was instructed to follow-up with his orthopedist in 1 week and to return for symptoms of shortness of breath, chest pain or any other new or worsening symptoms at which time he may need a repeat ultrasound.  I explained to him that sometimes a clot lower in the calf may not be seen on initial ultrasound.    Diagnosis:    ICD-10-CM    1. Right calf pain M79.661        Disposition:   discharged to home     Elevate, ice, follow-up with your doctor next week if you continue to have calf pain and or if the swelling gets worse.     Scribe Disclosure:  I, Mason Randolph, am serving as a scribe at 12:20 PM on 7/20/2018 to document services personally performed by Barb Adam MD based on my observations and the provider's statements to me.    Mason Randolph  7/20/2018   EMERGENCY DEPARTMENT       Barb Adam MD  07/20/18 [id number]

## 2018-07-20 NOTE — DISCHARGE INSTRUCTIONS
Elevate, ice, follow-up with your doctor next week if you continue to have calf pain and or if the swelling gets worse.

## 2018-07-20 NOTE — ED AVS SNAPSHOT
Emergency Department    [address] [place] [address]    Phone:  [phone]    Fax:  [phone]                                       Rakesh Carey   MRN: [id number]    Department:   Emergency Department   Date of Visit:  7/20/2018           After Visit Summary Signature Page     I have received my discharge instructions, and my questions have been answered. I have discussed any challenges I see with this plan with the nurse or doctor.    ..........................................................................................................................................  Patient/Patient Representative Signature      ..........................................................................................................................................  Patient Representative Print Name and Relationship to Patient    ..................................................               ................................................  Date                                            Time    ..........................................................................................................................................  Reviewed by Signature/Title    ...................................................              ..............................................  Date                                                            Time

## 2018-08-06 ENCOUNTER — TELEPHONE (OUTPATIENT)
Dept: FAMILY MEDICINE | Facility: CLINIC | Age: 48
End: 2018-08-06

## 2018-08-06 DIAGNOSIS — L03.019 CELLULITIS OF FINGER, UNSPECIFIED LATERALITY: Primary | ICD-10-CM

## 2018-08-06 NOTE — TELEPHONE ENCOUNTER
"Called and spoke with patient. He says he had this same thing a few years ago. It's on his left foot. When treated previously, he thought he had gotten what looked like a foot fungus but it had gotten infection and was put on an abx which treated it. It's in the same exact same spot this time as it was before. It's in the area right before pinkie toe-kind of swollen, red, puffy, inflamed, painful. Serosanguinous discharge, and has been present for about a week and a half. He says it appeared right after his ankle surgery.     I recommended that patient come in for an appointment, but he requests a phone visit because he recently had surgery on ankle, and is \"pretty much laid up\". Will route to provider to advise.     Gianni Lan RN    "

## 2018-08-06 NOTE — TELEPHONE ENCOUNTER
Reason for Call:  Other call back    Detailed comments: Patient is calling to schedule a phone visit with Dr Park.  Patient has an infection on his foot that Dr Park has prescribed antibiotics before in the past.      Phone Number Patient can be reached at: Home number on file [phone] (home)    Best Time: Any    Can we leave a detailed message on this number? YES    Call taken on 8/6/2018 at 8:40 AM by Alanna Rogers

## 2018-08-08 RX ORDER — CEPHALEXIN 500 MG/1
500 CAPSULE ORAL 3 TIMES DAILY
Qty: 30 CAPSULE | Refills: 0 | Status: SHIPPED | OUTPATIENT
Start: 2018-08-08 | End: 2018-12-20

## 2018-08-08 NOTE — TELEPHONE ENCOUNTER
Called patient. He said that there has been no change, no increase in infection symptoms and it's actually slightly improved. Signed medication order.    Gianni Lan RN

## 2018-08-08 NOTE — TELEPHONE ENCOUNTER
Please call patient and see how the finger is doing. If he is not willing or able to come in I would advise medications. Order pended    Chao Park MD

## 2018-10-02 ENCOUNTER — TRANSFERRED RECORDS (OUTPATIENT)
Dept: HEALTH INFORMATION MANAGEMENT | Facility: CLINIC | Age: 48
End: 2018-10-02

## 2018-10-03 PROCEDURE — 73630 X-RAY EXAM OF FOOT: CPT | Mod: LT

## 2018-10-09 ENCOUNTER — OFFICE VISIT (OUTPATIENT)
Dept: PODIATRY | Facility: CLINIC | Age: 48
End: 2018-10-09
Payer: COMMERCIAL

## 2018-10-09 VITALS
HEART RATE: 72 BPM | WEIGHT: 285 LBS | HEIGHT: 70 IN | SYSTOLIC BLOOD PRESSURE: 126 MMHG | BODY MASS INDEX: 40.8 KG/M2 | DIASTOLIC BLOOD PRESSURE: 88 MMHG

## 2018-10-09 DIAGNOSIS — B35.3 TINEA PEDIS OF LEFT FOOT: ICD-10-CM

## 2018-10-09 DIAGNOSIS — L98.8 MACERATION OF SKIN: Primary | ICD-10-CM

## 2018-10-09 PROCEDURE — 99214 OFFICE O/P EST MOD 30 MIN: CPT | Performed by: PODIATRIST

## 2018-10-09 ASSESSMENT — PAIN SCALES - GENERAL: PAINLEVEL: NO PAIN (0)

## 2018-10-09 NOTE — PATIENT INSTRUCTIONS
Try Iodine to macerated/wet interdigital skin as needed.  Thank you for choosing [facility]!    Follow up as needed     DR. CALL'S CLINIC LOCATIONS     MONDAY  [place] TUESDAY & FRIDAY AM  BRAND[place]   [address] [facility]   [address]   [place] [address] [place] [address]   [phone]  [phone]       WEDNESDAY  [place] SCHEDULE SURGERY: [phone]   [address] [facility] APPOINTMENTS: [phone]   [place] [address] BILLING QUESTIONS: [phone]         ATHLETE'S FOOT (TINEA PEDIS)  It is a rash that is caused by a fungus (most commonly Dermatophytes) in the outer layer of skin on the foot or in the nails. It can occur between the toes or on the instep and heel areas of the feet. Fungus will grow in warm and moist environments. The fungus that causes athlete's foot is contagious and you can get it from touching someone who has it of walking around bare foot around swimming pools, gyms, saunas, communal baths and showers, fitness centers or locker rooms.     SYMPTOMS  The symptoms of athlete's foot are numerous and include; itching, burning or stinging between the toes or on the soles of the feet, blisters, cracked and peeling skin, excessive dryness or excessive scaling on the bottom or sides of the feet, redness and softness with breaking down of the skin, especially between the toes, foot odor and thick, crumbly nails. Older males or people who live in warm humid environments are more prone to get it but it can develop at any age.    TREATMENT OPTIONS  Typically it can be treated with antifungal creams, lotions, ointments, sprays, or gels.  Many are available over the counter, some are prescription. It is important that you use them long enough, typically 4 weeks, to clear the rash. If an infection is particularly bad, your provider may prescribe oral medication. It is  important that you follow the directions for any medication carefully to prevent recurrence of the rash.    HOME TREATMENT  1.  Keep feet clean and dry  2.  Dry between your toes any time your feet become wet  3.  Wear socks that are made of cotton, wool, or fibers designed to wick moisture away  from skin. Nylon, lycra, and spandex tend to trap moisture.  4.  If you have blistering, you may soak your feet in Burow's solution (aluminum acetate is active ingredient. Domeboro is a brand sold at [employer]). This is available over the counter.  5.  Treat shows with antifungal powder  6.  Tea Tree oil may help reduce symptoms but does not cure the rash.    PREVENTION  1.  Change socks or stockings regularly.  2.  Use talcum powder on your feet if they sweat a lot.  3.  Do not borrow shoes.  4.  Let shoes dry out for 24 hours before wearing them again.  5.  Treat shoes with fungal powder  6.  Wear shoes that are well ventilated such as leather shoes or sandals.  Avoid shoes  made of synthetic materials such as vinyl or rubber.  7.  Wear water proof sandals when you are in public areas such as locker rooms, pools, communal baths, showers, saunas, and fitness centers.    FYI: Call or seek medical attention IMMEDIATELY if:  - You develop a fever over 100.4F for more than 24 hrs.  - There is a discharge of pus from infected areas.  - Red streaks develop from the affected areas  - Increase in redness, warmth, pain, swelling, or tenderness in the affected areas.    Body Mass Index (BMI)  Many things can cause foot and ankle problems. Foot structure, activity level, foot mechanics and injuries are common causes of pain.  One very important issue that often goes unmentioned, is body weight. Extra weight can cause increased stress on muscles, ligaments, bones and tendons.  Sometimes just a few extra pounds is all it takes to put one over her/his threshold. Without reducing that stress, it can be difficult to alleviate pain. Some  people are uncomfortable addressing this issue, but we feel it is important for you to think about it. As Foot &  Ankle specialists, our job is addressing the lower extremity problem and possible causes. Regarding extra body weight, we encourage patients to discuss diet and weight management plans with their primary care doctors. It is this team approach that gives you the best opportunity for pain relief and getting you back on your feet.

## 2018-10-09 NOTE — LETTER
"    10/9/2018         RE: Rakesh Carey  [address]  [facility] [address]        Dear Colleague,    Thank you for referring your patient, Rakesh Carey, to the [facility]. Please see a copy of my visit note below.    PATIENT HISTORY:  Rakesh Carey is a 48 year old male who presents to clinic for a recurrent L foot infection/wound.  Intermittent for 2 years.  Prior concern for tinea pedis.  Pt had biopsy by Derm 1 week ago.  Results not yet available.  Pt states just over a week ago the area \"flared up\" and \"pus came out.\"  Now improved, per pt.  XRs were taken and were negative.  He was using a topical antifungal prior with minimal improvement.  No pain today.  Recent R ankle fusion.  Nondiabetic.  No fevers, chills.  Family hx of DM.  Works in a warehouse.       EXAM:Vitals: /88  Pulse 72  Ht 5' 10\" (1.778 m)  Wt 285 lb (129.3 kg)  BMI 40.89 kg/m2  BMI= Body mass index is 40.89 kg/(m^2).    General appearance: Patient is alert and fully cooperative with history & exam.  No sign of distress is noted during the visit.     Dermatologic: Skin is intact to L foot.  2 sutures in place over dorsal L 4th webspace.  Very mild redness here, but no wound or drainage.  Mild maceration between 4th and 5th toes.  No paronychia or convincing evidence of soft tissue infection is noted.     Vascular: DP & PT pulses are intact & regular on the L.  No significant edema or varicosities noted.  CFT and skin temperature are normal.     Neurologic: Lower extremity sensation is intact to light touch.  No evidence of weakness or contracture in the lower extremities.  No evidence of neuropathy.     Musculoskeletal: No mass.  Patient is ambulatory without assistive device or brace.  No gross ankle deformity noted.  No foot or ankle joint effusion is noted.    XRs of L foot reviewed with pt.  No evidence of osteomyelitis.       ASSESSMENT: L foot recurrent infection/wound, suspect tinea pedis     PLAN: "  Reviewed patient's chart in epic.  Discussed condition and treatment options including pros and cons.    I suspect this is a recurrent tinea infection that develops a bacterial infection.  No convincing clinical evidence of acute infection today.  I advised drying better between the toes.  Use iodine prn for maceration.  Discussed topical antifungal powder options as helpful such as Tinactin.  Advised he continue care with Derm, based on biopsy results.    Discussed possible underlying cyst that drains, but no evidence today.  I would avoid advanced imaging at this time given recent R ankle fusion.  F/u as needed.    Elton Moura DPM, FACFAS    Weight management plan: Patient was referred to their PCP to discuss a diet and exercise plan.        Again, thank you for allowing me to participate in the care of your patient.        Sincerely,        Elton Moura DPM

## 2018-10-09 NOTE — MR AVS SNAPSHOT
After Visit Summary   10/9/2018    Rakesh Carey    MRN: [id number]           Patient Information     Date Of Birth          1970        Visit Information        Provider Department      10/9/2018 4:00 PM Elton Call, [facility]        Today's Diagnoses     Maceration of skin    -  1    Tinea pedis of left foot          Care Instructions    Try Iodine to macerated/wet interdigital skin as needed.  Thank you for choosing [facility] / [facility]!    Follow up as needed     DR. CALL'S CLINIC LOCATIONS     MONDAY  [place] TUESDAY & FRIDAY AM  [place]   [address] [facility]   [address]   [place] [address] [place], [place] [address] [phone]  [phone]       WEDNESDAY  [place] SCHEDULE SURGERY: [phone]   [address] [facility] APPOINTMENTS: [phone]   [place] [address] BILLING QUESTIONS: [phone]   [phone]         ATHLETE'S FOOT (TINEA PEDIS)  It is a rash that is caused by a fungus (most commonly Dermatophytes) in the outer layer of skin on the foot or in the nails. It can occur between the toes or on the instep and heel areas of the feet. Fungus will grow in warm and moist environments. The fungus that causes athlete's foot is contagious and you can get it from touching someone who has it of walking around bare foot around swimming pools, gyms, saunas, communal baths and showers, fitness centers or locker rooms.     SYMPTOMS  The symptoms of athlete's foot are numerous and include; itching, burning or stinging between the toes or on the soles of the feet, blisters, cracked and peeling skin, excessive dryness or excessive scaling on the bottom or sides of the feet, redness and softness with breaking down of the skin, especially between the toes, foot odor and thick, crumbly nails. Older males or people who live in warm humid environments are more prone to get  it but it can develop at any age.    TREATMENT OPTIONS  Typically it can be treated with antifungal creams, lotions, ointments, sprays, or gels.  Many are available over the counter, some are prescription. It is important that you use them long enough, typically 4 weeks, to clear the rash. If an infection is particularly bad, your provider may prescribe oral medication. It is important that you follow the directions for any medication carefully to prevent recurrence of the rash.    HOME TREATMENT  1.  Keep feet clean and dry  2.  Dry between your toes any time your feet become wet  3.  Wear socks that are made of cotton, wool, or fibers designed to wick moisture away  from skin. Nylon, lycra, and spandex tend to trap moisture.  4.  If you have blistering, you may soak your feet in Burow's solution (aluminum acetate is active ingredient. Domeboro is a brand sold at [employer]). This is available over the counter.  5.  Treat shows with antifungal powder  6.  Tea Tree oil may help reduce symptoms but does not cure the rash.    PREVENTION  1.  Change socks or stockings regularly.  2.  Use talcum powder on your feet if they sweat a lot.  3.  Do not borrow shoes.  4.  Let shoes dry out for 24 hours before wearing them again.  5.  Treat shoes with fungal powder  6.  Wear shoes that are well ventilated such as leather shoes or sandals.  Avoid shoes  made of synthetic materials such as vinyl or rubber.  7.  Wear water proof sandals when you are in public areas such as locker rooms, pools, communal baths, showers, saunas, and fitness centers.    FYI: Call or seek medical attention IMMEDIATELY if:  - You develop a fever over 100.4F for more than 24 hrs.  - There is a discharge of pus from infected areas.  - Red streaks develop from the affected areas  - Increase in redness, warmth, pain, swelling, or tenderness in the affected areas.    Body Mass Index (BMI)  Many things can cause foot and ankle problems. Foot structure,  "activity level, foot mechanics and injuries are common causes of pain.  One very important issue that often goes unmentioned, is body weight. Extra weight can cause increased stress on muscles, ligaments, bones and tendons.  Sometimes just a few extra pounds is all it takes to put one over her/his threshold. Without reducing that stress, it can be difficult to alleviate pain. Some people are uncomfortable addressing this issue, but we feel it is important for you to think about it. As Foot &  Ankle specialists, our job is addressing the lower extremity problem and possible causes. Regarding extra body weight, we encourage patients to discuss diet and weight management plans with their primary care doctors. It is this team approach that gives you the best opportunity for pain relief and getting you back on your feet.              Follow-ups after your visit        Follow-up notes from your care team     Return if symptoms worsen or fail to improve.      Who to contact     If you have questions or need follow up information about today's clinic visit or your schedule please contact [facility] directly at [phone].  Normal or non-critical lab and imaging results will be communicated to you by [employer], letter or phone within 4 business days after the clinic has received the results. If you do not hear from us within 7 days, please contact the clinic through [employer] or phone. If you have a critical or abnormal lab result, we will notify you by phone as soon as possible.  Submit refill requests through [employer] or call your pharmacy and they will forward the refill request to us. Please allow 3 business days for your refill to be completed.          Additional Information About Your Visit        [employer] Information     [employer] lets you send messages to your doctor, view your test results, renew your prescriptions, schedule appointments and more. To sign up, go to [email or web address] . Click on \"Log in\" " "on the left side of the screen, which will take you to the Welcome page. Then click on \"Sign up Now\" on the right side of the page.     You will be asked to enter the access code listed below, as well as some personal information. Please follow the directions to create your username and password.     Your access code is: [email or web address]  Expires: 2019  4:17 PM     Your access code will  in 90 days. If you need help or a new code, please call your [facility] clinic or [phone].        Care EveryWhere ID     This is your Care EveryWhere ID. This could be used by other organizations to access your [facility] medical records  [id number]        Your Vitals Were     Pulse Height BMI (Body Mass Index)             72 5' 10\" (1.778 m) 40.89 kg/m2          Blood Pressure from Last 3 Encounters:   10/09/18 126/88   18 137/82   18 136/89    Weight from Last 3 Encounters:   10/09/18 285 lb (129.3 kg)   18 285 lb (129.3 kg)   18 285 lb (129.3 kg)              Today, you had the following     No orders found for display       Primary Care Provider Office Phone # Fax #    Clarence Tawanda Park [phone]       [address] [facility] [address]        Equal Access to Services     [facility]: Hadii jessica nava hadasho Sotona, waaxda luqadaha, qaybta kaalmada adegregoryyada, humza carias . So [facility] [phone].    ATENCIÓN: Si habla español, tiene a lovett disposición servicios gratuitos de asistencia lingüística. Llame al [phone].    We comply with applicable federal civil rights laws and [place] laws. We do not discriminate on the basis of race, color, national origin, age, disability, sex, sexual orientation, or gender identity.            Thank you!     Thank you for choosing [facility]  for your care. Our goal is always to provide you with excellent care. Hearing back from our patients is one way we can continue to improve our " services. Please take a few minutes to complete the written survey that you may receive in the mail after your visit with us. Thank you!             Your Updated Medication List - Protect others around you: Learn how to safely use, store and throw away your medicines at www.disposemymeds.org.          This list is accurate as of 10/9/18  4:17 PM.  Always use your most recent med list.                   Brand Name Dispense Instructions for use Diagnosis    acyclovir 400 MG tablet    ZOVIRAX    30 tablet    Take 1 tablet (400 mg) by mouth 3 times daily    Recurrent cold sores       cephALEXin 500 MG capsule    KEFLEX    30 capsule    Take 1 capsule (500 mg) by mouth 3 times daily    Cellulitis of finger, unspecified laterality       cyclobenzaprine 10 MG tablet    FLEXERIL    90 tablet    Take 1 tablet (10 mg) by mouth 3 times daily as needed for muscle spasms    Back pain with radiation       * HYDROcodone-acetaminophen 5-325 MG per tablet    NORCO    90 tablet    Take 1 tablet by mouth every 6 hours as needed for pain    Back pain with radiation, Traumatic arthritis of ankle, unspecified laterality, Chronic pain due to trauma       * HYDROcodone-acetaminophen 5-325 MG per tablet    NORCO    90 tablet    Take 1 tablet by mouth every 6 hours as needed for pain    Back pain with radiation, Traumatic arthritis of ankle, unspecified laterality, Chronic pain due to trauma       * HYDROcodone-acetaminophen 5-325 MG per tablet    NORCO    90 tablet    Take 1 tablet by mouth every 6 hours as needed for pain    Traumatic arthritis of ankle, unspecified laterality, Chronic pain due to trauma       HYDROXYZINE HCL PO      Take 25 mg by mouth every 6 hours as needed for itching        ibuprofen 800 MG tablet    ADVIL/MOTRIN    100 tablet    TAKE 1 TABLET BY MOUTH 3 TIMES DAILY AS NEEDED FOR PAIN    Traumatic arthritis of ankle, right       metoprolol succinate 50 MG 24 hr tablet    TOPROL-XL    90 tablet    Take 1 tablet (50 mg)  by mouth daily    Essential hypertension with goal blood pressure less than 140/90       omeprazole 20 MG CR capsule    priLOSEC    60 capsule    TAKE 1 CAPSULE (20 MG) BY MOUTH 2 TIMES DAILY    Gastroesophageal reflux disease with esophagitis       * Notice:  This list has 3 medication(s) that are the same as other medications prescribed for you. Read the directions carefully, and ask your doctor or other care provider to review them with you.

## 2018-10-10 NOTE — PROGRESS NOTES
"PATIENT HISTORY:  Rakesh Carey is a 48 year old male who presents to clinic for a recurrent L foot infection/wound.  Intermittent for 2 years.  Prior concern for tinea pedis.  Pt had biopsy by Derm 1 week ago.  Results not yet available.  Pt states just over a week ago the area \"flared up\" and \"pus came out.\"  Now improved, per pt.  XRs were taken and were negative.  He was using a topical antifungal prior with minimal improvement.  No pain today.  Recent R ankle fusion.  Nondiabetic.  No fevers, chills.  Family hx of DM.  Works in a [employer].       EXAM:Vitals: /88  Pulse 72  Ht 5' 10\" (1.778 m)  Wt 285 lb (129.3 kg)  BMI 40.89 kg/m2  BMI= Body mass index is 40.89 kg/(m^2).    General appearance: Patient is alert and fully cooperative with history & exam.  No sign of distress is noted during the visit.     Dermatologic: Skin is intact to L foot.  2 sutures in place over dorsal L 4th webspace.  Very mild redness here, but no wound or drainage.  Mild maceration between 4th and 5th toes.  No paronychia or convincing evidence of soft tissue infection is noted.     Vascular: DP & PT pulses are intact & regular on the L.  No significant edema or varicosities noted.  CFT and skin temperature are normal.     Neurologic: Lower extremity sensation is intact to light touch.  No evidence of weakness or contracture in the lower extremities.  No evidence of neuropathy.     Musculoskeletal: No mass.  Patient is ambulatory without assistive device or brace.  No gross ankle deformity noted.  No foot or ankle joint effusion is noted.    XRs of L foot reviewed with pt.  No evidence of osteomyelitis.       ASSESSMENT: L foot recurrent infection/wound, suspect tinea pedis     PLAN:  Reviewed patient's chart in epic.  Discussed condition and treatment options including pros and cons.    I suspect this is a recurrent tinea infection that develops a bacterial infection.  No convincing clinical evidence of acute infection " today.  I advised drying better between the toes.  Use iodine prn for maceration.  Discussed topical antifungal powder options as helpful such as Tinactin.  Advised he continue care with Derm, based on biopsy results.    Discussed possible underlying cyst that drains, but no evidence today.  I would avoid advanced imaging at this time given recent R ankle fusion.  F/u as needed.    Elton Moura DPM, FACFAS    Weight management plan: Patient was referred to their PCP to discuss a diet and exercise plan.

## 2018-11-12 ENCOUNTER — TELEPHONE (OUTPATIENT)
Dept: FAMILY MEDICINE | Facility: CLINIC | Age: 48
End: 2018-11-12

## 2018-11-12 NOTE — TELEPHONE ENCOUNTER
Reason for Call:  Other     Detailed comments: Patient needs labs and height and weight sent to address listed in records.    Phone Number Patient can be reached at: Home number on file [phone] (home)    Best Time:     Can we leave a detailed message on this number? Not Applicable    Call taken on 11/12/2018 at 11:02 AM by Lazara Navarrete

## 2018-11-23 ENCOUNTER — TELEPHONE (OUTPATIENT)
Dept: FAMILY MEDICINE | Facility: CLINIC | Age: 48
End: 2018-11-23

## 2018-11-23 NOTE — TELEPHONE ENCOUNTER
JESSICA received from patient for a copy of most recent physical and labs to be picked up at .    Placed at  and LMOM to inform patient.    Thanks!  Gianni Collier

## 2018-12-20 ENCOUNTER — OFFICE VISIT (OUTPATIENT)
Dept: URGENT CARE | Facility: URGENT CARE | Age: 48
End: 2018-12-20
Payer: COMMERCIAL

## 2018-12-20 VITALS
DIASTOLIC BLOOD PRESSURE: 98 MMHG | OXYGEN SATURATION: 93 % | HEART RATE: 83 BPM | TEMPERATURE: 98.6 F | RESPIRATION RATE: 18 BRPM | BODY MASS INDEX: 44.48 KG/M2 | SYSTOLIC BLOOD PRESSURE: 145 MMHG | WEIGHT: 310 LBS

## 2018-12-20 DIAGNOSIS — R05.8 PRODUCTIVE COUGH: ICD-10-CM

## 2018-12-20 DIAGNOSIS — R06.2 WHEEZE: ICD-10-CM

## 2018-12-20 DIAGNOSIS — J18.9 PNEUMONIA OF LEFT LOWER LOBE DUE TO INFECTIOUS ORGANISM: Primary | ICD-10-CM

## 2018-12-20 DIAGNOSIS — R06.02 SOB (SHORTNESS OF BREATH): ICD-10-CM

## 2018-12-20 PROCEDURE — 99214 OFFICE O/P EST MOD 30 MIN: CPT | Performed by: FAMILY MEDICINE

## 2018-12-20 RX ORDER — ALBUTEROL SULFATE 90 UG/1
2 AEROSOL, METERED RESPIRATORY (INHALATION) EVERY 6 HOURS
Qty: 1 INHALER | Refills: 0 | Status: SHIPPED | OUTPATIENT
Start: 2018-12-20 | End: 2019-12-12

## 2018-12-20 RX ORDER — BENZONATATE 200 MG/1
200 CAPSULE ORAL 3 TIMES DAILY PRN
Qty: 21 CAPSULE | Refills: 0 | Status: SHIPPED | OUTPATIENT
Start: 2018-12-20 | End: 2018-12-27

## 2018-12-20 RX ORDER — AZITHROMYCIN 250 MG/1
TABLET, FILM COATED ORAL
Qty: 6 TABLET | Refills: 0 | Status: SHIPPED | OUTPATIENT
Start: 2018-12-20 | End: 2018-12-25

## 2018-12-20 RX ORDER — PREDNISONE 20 MG/1
20 TABLET ORAL DAILY
Qty: 7 TABLET | Refills: 0 | Status: SHIPPED | OUTPATIENT
Start: 2018-12-20 | End: 2018-12-27
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
## 2019-03-27 DIAGNOSIS — I10 ESSENTIAL HYPERTENSION WITH GOAL BLOOD PRESSURE LESS THAN 140/90: ICD-10-CM

## 2019-03-27 NOTE — TELEPHONE ENCOUNTER
"Requested Prescriptions   Pending Prescriptions Disp Refills     metoprolol succinate ER (TOPROL-XL) 50 MG 24 hr tablet [Pharmacy Med Name: METOPROLOL SUCC ER 50 MG TAB]  Last Written Prescription Date:  3/8/2018  Last Fill Quantity: 90 tabs,  # refills: 3   Last office visit: 7/12/2018 with prescribing provider:  Xavier   Future Office Visit:     90 tablet 2     Sig: TAKE 1 TABLET (50 MG) BY MOUTH DAILY    Beta-Blockers Protocol Failed - 3/27/2019  1:40 AM       Failed - Blood pressure under 140/90 in past 12 months    BP Readings from Last 3 Encounters:   12/20/18 (!) 145/98   10/09/18 126/88   07/20/18 137/82                Passed - Patient is age 6 or older       Passed - Recent (12 mo) or future (30 days) visit within the authorizing provider's specialty    Patient had office visit in the last 12 months or has a visit in the next 30 days with authorizing provider or within the authorizing provider's specialty.  See \"Patient Info\" tab in inbasket, or \"Choose Columns\" in Meds & Orders section of the refill encounter.             Passed - Medication is active on med list          "

## 2019-03-28 RX ORDER — METOPROLOL SUCCINATE 50 MG/1
50 TABLET, EXTENDED RELEASE ORAL DAILY
Qty: 90 TABLET | Refills: 0 | Status: SHIPPED | OUTPATIENT
Start: 2019-03-28 | End: 2019-06-25

## 2019-03-28 NOTE — TELEPHONE ENCOUNTER
Prescription approved per [facility] Refill Protocol.    Mega Buitrago RN

## 2019-06-25 DIAGNOSIS — I10 ESSENTIAL HYPERTENSION WITH GOAL BLOOD PRESSURE LESS THAN 140/90: ICD-10-CM

## 2019-06-25 NOTE — TELEPHONE ENCOUNTER
"Requested Prescriptions   Pending Prescriptions Disp Refills     metoprolol succinate ER (TOPROL-XL) 50 MG 24 hr tablet [Pharmacy Med Name: METOPROLOL SUCC ER 50 MG TAB]  Last Written Prescription Date:  3/28/2019  Last Fill Quantity: 90 tablet,  # refills: 0   Last office visit: 7/12/2018 with prescribing provider:  DARIA Park   Future Office Visit:   Next 5 appointments (look out 90 days)    Jun 27, 2019  6:00 PM CDT  PHYSICAL with Clarence Park MD  [facility] ([address] [id number] [phone]          90 tablet 0     Sig: TAKE 1 TABLET BY MOUTH EVERY DAY       Beta-Blockers Protocol Failed - 6/25/2019 11:32 AM        Failed - Blood pressure under 140/90 in past 12 months     BP Readings from Last 3 Encounters:   12/20/18 (!) 145/98   10/09/18 126/88   07/20/18 137/82             Passed - Patient is age 6 or older        Passed - Recent (12 mo) or future (30 days) visit within the authorizing provider's specialty     Patient had office visit in the last 12 months or has a visit in the next 30 days with authorizing provider or within the authorizing provider's specialty.  See \"Patient Info\" tab in inbasket, or \"Choose Columns\" in Meds & Orders section of the refill encounter.              Passed - Medication is active on med list          "

## 2019-06-27 ENCOUNTER — OFFICE VISIT (OUTPATIENT)
Dept: FAMILY MEDICINE | Facility: CLINIC | Age: 49
End: 2019-06-27
Payer: COMMERCIAL

## 2019-06-27 VITALS
WEIGHT: 308 LBS | BODY MASS INDEX: 44.19 KG/M2 | TEMPERATURE: 97.9 F | SYSTOLIC BLOOD PRESSURE: 134 MMHG | DIASTOLIC BLOOD PRESSURE: 88 MMHG

## 2019-06-27 DIAGNOSIS — K21.00 GASTROESOPHAGEAL REFLUX DISEASE WITH ESOPHAGITIS: ICD-10-CM

## 2019-06-27 DIAGNOSIS — E78.5 HYPERLIPIDEMIA WITH TARGET LDL LESS THAN 130: ICD-10-CM

## 2019-06-27 DIAGNOSIS — Z00.00 ROUTINE HISTORY AND PHYSICAL EXAMINATION OF ADULT: Primary | ICD-10-CM

## 2019-06-27 DIAGNOSIS — E66.01 MORBID OBESITY DUE TO EXCESS CALORIES (H): ICD-10-CM

## 2019-06-27 DIAGNOSIS — M54.9 BACK PAIN WITH RADIATION: ICD-10-CM

## 2019-06-27 DIAGNOSIS — I10 HYPERTENSION GOAL BP (BLOOD PRESSURE) < 140/90: ICD-10-CM

## 2019-06-27 DIAGNOSIS — M12.571 TRAUMATIC ARTHRITIS OF ANKLE, RIGHT: ICD-10-CM

## 2019-06-27 DIAGNOSIS — F33.1 MODERATE EPISODE OF RECURRENT MAJOR DEPRESSIVE DISORDER (H): ICD-10-CM

## 2019-06-27 DIAGNOSIS — B00.1 RECURRENT COLD SORES: ICD-10-CM

## 2019-06-27 PROCEDURE — 80048 BASIC METABOLIC PNL TOTAL CA: CPT | Performed by: FAMILY MEDICINE

## 2019-06-27 PROCEDURE — 80061 LIPID PANEL: CPT | Performed by: FAMILY MEDICINE

## 2019-06-27 PROCEDURE — 99396 PREV VISIT EST AGE 40-64: CPT | Performed by: FAMILY MEDICINE

## 2019-06-27 PROCEDURE — 36415 COLL VENOUS BLD VENIPUNCTURE: CPT | Performed by: FAMILY MEDICINE

## 2019-06-27 RX ORDER — METOPROLOL SUCCINATE 50 MG/1
50 TABLET, EXTENDED RELEASE ORAL DAILY
Qty: 90 TABLET | Refills: 3 | Status: SHIPPED | OUTPATIENT
Start: 2019-06-27 | End: 2019-12-12 | Stop reason: ALTCHOICE

## 2019-06-27 RX ORDER — CYCLOBENZAPRINE HCL 10 MG
10 TABLET ORAL 3 TIMES DAILY PRN
Qty: 90 TABLET | Refills: 3 | Status: SHIPPED | OUTPATIENT
Start: 2019-06-27 | End: 2020-12-31

## 2019-06-27 RX ORDER — BUPROPION HYDROCHLORIDE 150 MG/1
TABLET, EXTENDED RELEASE ORAL
Qty: 180 TABLET | Refills: 0 | Status: SHIPPED | OUTPATIENT
Start: 2019-06-27 | End: 2019-10-05

## 2019-06-27 RX ORDER — METOPROLOL SUCCINATE 50 MG/1
TABLET, EXTENDED RELEASE ORAL
Qty: 90 TABLET | Refills: 0 | Status: SHIPPED | OUTPATIENT
Start: 2019-06-27 | End: 2019-06-27

## 2019-06-27 RX ORDER — IBUPROFEN 800 MG/1
TABLET, FILM COATED ORAL
Qty: 100 TABLET | Refills: 6 | Status: SHIPPED | OUTPATIENT
Start: 2019-06-27 | End: 2020-12-31

## 2019-06-27 RX ORDER — ACYCLOVIR 400 MG/1
400 TABLET ORAL 3 TIMES DAILY
Qty: 30 TABLET | Refills: 5 | Status: SHIPPED | OUTPATIENT
Start: 2019-06-27 | End: 2021-06-10

## 2019-06-27 ASSESSMENT — ENCOUNTER SYMPTOMS
SHORTNESS OF BREATH: 0
COUGH: 0
MYALGIAS: 0
CONSTIPATION: 0
SORE THROAT: 0
HEADACHES: 0
JOINT SWELLING: 0
WEAKNESS: 0
DIZZINESS: 0
PALPITATIONS: 0
NAUSEA: 0
ARTHRALGIAS: 1
CHILLS: 0
DIARRHEA: 0
DYSURIA: 0
FREQUENCY: 0
HEMATURIA: 0
EYE PAIN: 0
ABDOMINAL PAIN: 0
PARESTHESIAS: 0
HEARTBURN: 0
NERVOUS/ANXIOUS: 1
HEMATOCHEZIA: 0
FEVER: 0

## 2019-06-27 NOTE — TELEPHONE ENCOUNTER
Prescription approved per [facility] Refill Protocol.  Cleo Tinsley,Clinic Rn  [facility]

## 2019-06-27 NOTE — LETTER
"[facility]  [address] [place] [id number] [phone]                                                                                                July 1, 2019    Rakesh Carey  [address]  [facility] [address]        Dear Mr. Carey     The results of your recent lab tests showed the cholesterol to be elevated. The rest of the tests are good. Your plan to work on the diet and weight loss is excellent and appropriate for this level of cholesterol.     Ways to improve your cholesterol...     1- Eats less saturated fats (including avoiding \"trans\" fats).     2 - Eat more unsaturated fats  - found in vegetables, grains, and tree nuts.   Also by replacing butter with canola oil or olive oil.     3 - Eat more nuts.   1-2 ounces (a small handful) of almonds, walnuts, hazelnuts or pecans once a day in place of other less healthy snacks.     4 - Eat more high fiber foods - vegetables and whole grains including oat bran, oats, beans, peas, and flax seed.     5 - Eat more fish - such as salmon, tuna, mackerel, and sardines.  1 or 2 six ounce servings per week is a healthy replacement for other proteins.     6 - Exercise for at least 120 minutes per week - which is equal to 30 minutes 4 days per week.         Sincerely,       Clarence Park MD /    Results for orders placed or performed in visit on 06/27/19   Lipid panel reflex to direct LDL Fasting   Result Value Ref Range    Cholesterol 216 (H) <200 mg/dL    Triglycerides 140 <150 mg/dL    HDL Cholesterol 40 >39 mg/dL    LDL Cholesterol Calculated 148 (H) <100 mg/dL    Non HDL Cholesterol 176 (H) <130 mg/dL   Basic metabolic panel   Result Value Ref Range    Sodium 139 133 - 144 mmol/L    Potassium 3.9 3.4 - 5.3 mmol/L    Chloride 107 94 - 109 mmol/L    Carbon Dioxide 23 20 - 32 mmol/L    Anion Gap 9 3 - 14 mmol/L    Glucose 78 70 - 99 mg/dL    Urea Nitrogen 10 7 - 30 mg/dL    Creatinine 0.79 0.66 - 1.25 mg/dL    " GFR Estimate >90 >60 mL/min/[1.73_m2]    GFR Estimate If Black >90 >60 mL/min/[1.73_m2]    Calcium 8.9 8.5 - 10.1 mg/dL

## 2019-06-27 NOTE — PATIENT INSTRUCTIONS
Take 1 tablet of wellbutrin by mouth daily for 1 WEEK, then take 1 tablet twice daily.       Preventive Health Recommendations  Male Ages 40 to 49    Yearly exam:             See your health care provider every year in order to  o   Review health changes.   o   Discuss preventive care.    o   Review your medicines if your doctor has prescribed any.    You should be tested each year for STDs (sexually transmitted diseases) if you re at risk.     Have a cholesterol test every 5 years.     Have a colonoscopy (test for colon cancer) if someone in your family has had colon cancer or polyps before age 50.     After age 45, have a diabetes test (fasting glucose). If you are at risk for diabetes, you should have this test every 3 years.      Talk with your health care provider about whether or not a prostate cancer screening test (PSA) is right for you.    Shots: Get a flu shot each year. Get a tetanus shot every 10 years.     Nutrition:    Eat at least 5 servings of fruits and vegetables daily.     Eat whole-grain bread, whole-wheat pasta and brown rice instead of white grains and rice.     Get adequate Calcium and Vitamin D.     Lifestyle    Exercise for at least 150 minutes a week (30 minutes a day, 5 days a week). This will help you control your weight and prevent disease.     Limit alcohol to one drink per day.     No smoking.     Wear sunscreen to prevent skin cancer.     See your dentist every six months for an exam and cleaning.     Orders Placed This Encounter     Lipid panel reflex to direct LDL Fasting     Last Lab Result: LDL Cholesterol Calculated (mg/dL)       Date                     Value                 06/28/2018               145 (H)          ----------     Order Specific Question:   Perform labs while fasting or non-fasting?     Answer:   Fasting     Basic metabolic panel     buPROPion (WELLBUTRIN SR) 150 MG 12 hr tablet     Sig: TAKE 1 TABLET BY MOUTH ONCE DAILY FOR 4 TO 7 DAYS, THEN TAKE 1 TABLET  TWICE DAILY     Dispense:  180 tablet     Refill:  0     metoprolol succinate ER (TOPROL-XL) 50 MG 24 hr tablet     Sig: Take 1 tablet (50 mg) by mouth daily     Dispense:  90 tablet     Refill:  3     omeprazole (PRILOSEC) 20 MG DR capsule     Sig: TAKE 1 CAPSULE (20 MG) BY MOUTH 2 TIMES DAILY     Dispense:  60 capsule     Refill:  9     acyclovir (ZOVIRAX) 400 MG tablet     Sig: Take 1 tablet (400 mg) by mouth 3 times daily     Dispense:  30 tablet     Refill:  5     cyclobenzaprine (FLEXERIL) 10 MG tablet     Sig: Take 1 tablet (10 mg) by mouth 3 times daily as needed for muscle spasms     Dispense:  90 tablet     Refill:  3     ibuprofen (ADVIL/MOTRIN) 800 MG tablet     Sig: TAKE 1 TABLET BY MOUTH 3 TIMES DAILY AS NEEDED FOR PAIN     Dispense:  100 tablet     Refill:  6
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
## 2019-06-28 LAB
ANION GAP SERPL CALCULATED.3IONS-SCNC: 9 MMOL/L (ref 3–14)
BUN SERPL-MCNC: 10 MG/DL (ref 7–30)
CALCIUM SERPL-MCNC: 8.9 MG/DL (ref 8.5–10.1)
CHLORIDE SERPL-SCNC: 107 MMOL/L (ref 94–109)
CHOLEST SERPL-MCNC: 216 MG/DL
CO2 SERPL-SCNC: 23 MMOL/L (ref 20–32)
CREAT SERPL-MCNC: 0.79 MG/DL (ref 0.66–1.25)
GFR SERPL CREATININE-BSD FRML MDRD: >90 ML/MIN/{1.73_M2}
GLUCOSE SERPL-MCNC: 78 MG/DL (ref 70–99)
HDLC SERPL-MCNC: 40 MG/DL
LDLC SERPL CALC-MCNC: 148 MG/DL
NONHDLC SERPL-MCNC: 176 MG/DL
POTASSIUM SERPL-SCNC: 3.9 MMOL/L (ref 3.4–5.3)
SODIUM SERPL-SCNC: 139 MMOL/L (ref 133–144)
TRIGL SERPL-MCNC: 140 MG/DL

## 2019-10-05 ENCOUNTER — TELEPHONE (OUTPATIENT)
Dept: FAMILY MEDICINE | Facility: CLINIC | Age: 49
End: 2019-10-05

## 2019-10-05 DIAGNOSIS — F33.1 MODERATE EPISODE OF RECURRENT MAJOR DEPRESSIVE DISORDER (H): ICD-10-CM

## 2019-10-07 NOTE — TELEPHONE ENCOUNTER
"Requested Prescriptions   Pending Prescriptions Disp Refills     buPROPion (WELLBUTRIN SR) 150 MG 12 hr tablet [Pharmacy Med Name: BUPROPION HCL  MG TABLET]  Last Written Prescription Date:  6/27/2019  Last Fill Quantity: 180 tablet,  # refills: 0   Last office visit: 6/27/2019 with prescribing provider:  DARIA Park   Future Office Visit:   180 tablet 0     Sig: TAKE 1 TABLET BY MOUTH ONCE DAILY FOR 4 TO 7 DAYS, THEN TAKE 1 TABLET TWICE DAILY       SSRIs Protocol Failed - 10/5/2019  9:23 AM        Failed - PHQ-9 score less than 5 in past 6 months     Please review last PHQ-9 score.   PHQ-9 SCORE 9/29/2016 12/22/2016 7/12/2018   PHQ-9 Total Score 15 3 1     THOMAS-7 SCORE 12/17/2015 12/22/2016 7/12/2018   Total Score 3 3 2             Passed - Medication is Bupropion     If the medication is Bupropion (Wellbutrin), and the patient is taking for smoking cessation; OK to refill.          Passed - Medication is active on med list        Passed - Patient is age 18 or older        Passed - Recent (6 mo) or future (30 days) visit within the authorizing provider's specialty     Patient had office visit in the last 6 months or has a visit in the next 30 days with authorizing provider or within the authorizing provider's specialty.  See \"Patient Info\" tab in inbasket, or \"Choose Columns\" in Meds & Orders section of the refill encounter.            "

## 2019-10-09 RX ORDER — BUPROPION HYDROCHLORIDE 150 MG/1
TABLET, EXTENDED RELEASE ORAL
Qty: 180 TABLET | Refills: 0 | Status: SHIPPED | OUTPATIENT
Start: 2019-10-09 | End: 2019-12-12

## 2019-10-09 NOTE — TELEPHONE ENCOUNTER
MA/LPN,    Please contact patient for a PHQ-9.  Refill was given.     Thank you,    Mega Buitrago RN

## 2019-10-17 NOTE — TELEPHONE ENCOUNTER
Patient called back regarding the PHQ-9 and would like to speak to an MA or an RN on the matter. He would like a call back tomorrow between 3pm and 5pm at [phone] to discuss that and completed it.       Thank You,  Venkata Willard

## 2019-10-21 NOTE — TELEPHONE ENCOUNTER
Left message on answering machine for patient to call back to complete PHQ-9.    Bertha Vyas MA

## 2019-10-22 NOTE — TELEPHONE ENCOUNTER
Patient was already given a jazmyne refill and now will be due for a 6 month depression f/u appt with PCP anyway, has appt scheduled to see PCP in December, so will close encounter at this time.    Tamika Larios RN

## 2019-10-22 NOTE — TELEPHONE ENCOUNTER
3 attempts to reach out to patient already regarding PHQ-9. Will flag back for the RNs    Bertha Vyas MA

## 2019-12-12 ENCOUNTER — OFFICE VISIT (OUTPATIENT)
Dept: FAMILY MEDICINE | Facility: CLINIC | Age: 49
End: 2019-12-12
Payer: COMMERCIAL

## 2019-12-12 DIAGNOSIS — N52.9 ERECTILE DYSFUNCTION, UNSPECIFIED ERECTILE DYSFUNCTION TYPE: Primary | ICD-10-CM

## 2019-12-12 DIAGNOSIS — F32.5 DEPRESSION, MAJOR, SINGLE EPISODE, COMPLETE REMISSION (H): ICD-10-CM

## 2019-12-12 DIAGNOSIS — I10 HYPERTENSION GOAL BP (BLOOD PRESSURE) < 140/90: ICD-10-CM

## 2019-12-12 PROCEDURE — 99214 OFFICE O/P EST MOD 30 MIN: CPT | Performed by: FAMILY MEDICINE

## 2019-12-12 RX ORDER — METOPROLOL SUCCINATE 50 MG/1
50 TABLET, EXTENDED RELEASE ORAL DAILY
Qty: 90 TABLET | Refills: 3 | Status: CANCELLED | OUTPATIENT
Start: 2019-12-12

## 2019-12-12 RX ORDER — SILDENAFIL CITRATE 20 MG/1
20 TABLET ORAL 3 TIMES DAILY
Qty: 10 TABLET | Refills: 5 | Status: SHIPPED | OUTPATIENT
Start: 2019-12-12 | End: 2019-12-12

## 2019-12-12 RX ORDER — LISINOPRIL 20 MG/1
20 TABLET ORAL DAILY
Qty: 90 TABLET | Refills: 3 | Status: SHIPPED | OUTPATIENT
Start: 2019-12-12 | End: 2020-07-02 | Stop reason: ALTCHOICE

## 2019-12-12 RX ORDER — BUPROPION HYDROCHLORIDE 150 MG/1
TABLET, EXTENDED RELEASE ORAL
Qty: 180 TABLET | Refills: 3 | Status: CANCELLED | OUTPATIENT
Start: 2019-12-12

## 2019-12-12 RX ORDER — SILDENAFIL CITRATE 20 MG/1
TABLET ORAL
Qty: 10 TABLET | Refills: 5 | Status: SHIPPED | OUTPATIENT
Start: 2019-12-12 | End: 2020-12-31

## 2019-12-12 ASSESSMENT — ANXIETY QUESTIONNAIRES
1. FEELING NERVOUS, ANXIOUS, OR ON EDGE: SEVERAL DAYS
7. FEELING AFRAID AS IF SOMETHING AWFUL MIGHT HAPPEN: NOT AT ALL
GAD7 TOTAL SCORE: 2
2. NOT BEING ABLE TO STOP OR CONTROL WORRYING: NOT AT ALL
6. BECOMING EASILY ANNOYED OR IRRITABLE: NOT AT ALL
5. BEING SO RESTLESS THAT IT IS HARD TO SIT STILL: SEVERAL DAYS
IF YOU CHECKED OFF ANY PROBLEMS ON THIS QUESTIONNAIRE, HOW DIFFICULT HAVE THESE PROBLEMS MADE IT FOR YOU TO DO YOUR WORK, TAKE CARE OF THINGS AT HOME, OR GET ALONG WITH OTHER PEOPLE: NOT DIFFICULT AT ALL
3. WORRYING TOO MUCH ABOUT DIFFERENT THINGS: NOT AT ALL

## 2019-12-12 ASSESSMENT — MIFFLIN-ST. JEOR: SCORE: 2295.54

## 2019-12-12 ASSESSMENT — PATIENT HEALTH QUESTIONNAIRE - PHQ9
5. POOR APPETITE OR OVEREATING: NOT AT ALL
SUM OF ALL RESPONSES TO PHQ QUESTIONS 1-9: 1

## 2019-12-12 NOTE — PATIENT INSTRUCTIONS
Orders Placed This Encounter     lisinopril (PRINIVIL/ZESTRIL) 20 MG tablet     Sig: Take 1 tablet (20 mg) by mouth daily     Dispense:  90 tablet              sildenafil (REVATIO) 20 MG tablet     Sig: Take 1 tablet (20 mg) prior to activity     Dispense:  10 tablet     Refill:  5     Ok to increase to 40 mg of lisinopril of BP not controlled

## 2019-12-12 NOTE — PROGRESS NOTES
Subjective     Rakesh Carey is a 49 year old male who presents to clinic today for the following health issues:    HPI     Declined flu shot  Insurance will change at the end of the year.    Hypertension Follow-up      Do you check your blood pressure regularly outside of the clinic? Yes twice a week when he gives plasma.    Are you following a low salt diet? Yes    Are your blood pressures ever more than 140 on the top number (systolic) OR more   than 90 on the bottom number (diastolic), for example 140/90? Yes systolic is high    Depression Follow up  Stop taking medication 1-1.5 month was making him irritable. Wife said it didn't help his mood    How are you doing with your depression since your last visit? good    Are you having other symptoms that might be associated with depression? No    Have you had a significant life event?  No     Are you feeling anxious or having panic attacks?   No    Do you have any concerns with your use of alcohol or other drugs? No    Social History     Tobacco Use     Smoking status: Former Smoker     Packs/day: 0.25     Years: 20.00     Pack years: 5.00     Types: Cigarettes     Last attempt to quit: 2013     Years since quittin.5     Smokeless tobacco: Never Used     Tobacco comment: <1/2 ppd   Substance Use Topics     Alcohol use: Yes     Comment: 5 glasses of wine on weekend     Drug use: No     PHQ 2016   PHQ-9 Total Score 3 1 1   Q9: Thoughts of better off dead/self-harm past 2 weeks Not at all Not at all Not at all     THOMAS-7 SCORE 2016   Total Score 3 2 2       He has stopped his medication and feels good. Stresses are work stresses. Does no feel depressed or need for medication  Suicide Assessment Five-step Evaluation and Treatment (SAFE-T)       How many servings of fruits and vegetables do you eat daily?  4 or more    On average, how many sweetened beverages do you drink each day (Examples: soda, juice,  "sweet tea, etc.  Do NOT count diet or artificially sweetened beverages)?   Water and coffee.     How many days per week do you miss taking your medication? 0    BP Readings from Last 3 Encounters:   12/13/19 137/88   06/27/19 134/88   12/20/18 (!) 145/98    Wt Readings from Last 3 Encounters:   12/12/19 142.4 kg (314 lb)   06/27/19 139.7 kg (308 lb)   12/20/18 140.6 kg (310 lb)        Reviewed and updated as needed this visit by Provider         Review of Systems   ROS COMP: primary concern today is his erectile dysfunction. He has had some return of function after discontinuation of narcotics but poor performance when he and wife are sexually active. Reviewed potential other contributors. He is on a B blocker for his blood pressure. No other symptoms of low testosterone. Dung discussion on options for treatment.    Reviewed change of medication. We will change to lisinopril and discontinue metoprolol.    Trial of sildenafil. Reviewed risks benefits and side effects.            Objective    /88   Temp 97.8  F (36.6  C) (Oral)   Ht 1.778 m (5' 10\")   Wt 142.4 kg (314 lb)   BMI 45.05 kg/m    Body mass index is 45.05 kg/m .     Physical Exam   GENERAL: healthy, alert and no distress  EYES: Eyes grossly normal to inspection, PERRL and conjunctivae and sclerae normal  HENT: ear canals and TM's normal, nose and mouth   RESP: lungs clear to auscultation - no rales, rhonchi or wheezes  CV: regular rate and rhythm, normal S1 S2, no S3 or S4, no murmur, click or rub, no peripheral edema and peripheral pulses strong  ABDOMEN: soft, nontender, no hepatosplenomegaly, no masses and bowel sounds normal   (male): normal male genitalia   MS: no gross musculoskeletal defects noted, no edema  NEURO: Normal strength and tone, mentation intact and speech normal  PSYCH: mentation appears normal, affect normal/bright    Diagnostic Test Results:  Labs reviewed in Epic  No results found for this or any previous visit (from the " past 24 hour(s)).        Assessment & Plan       ICD-10-CM    1. Erectile dysfunction, unspecified erectile dysfunction type N52.9 sildenafil (REVATIO) 20 MG tablet     DISCONTINUED: sildenafil (REVATIO) 20 MG tablet   2. Hypertension goal BP (blood pressure) < 140/90 I10 lisinopril (PRINIVIL/ZESTRIL) 20 MG tablet   3. Depression, major, single episode, complete remission (H) F32.5    he will monitor his blood pressure and follow up if not controlled. He can increase to 40 mg if needed        25 min spent with patient >50% in review and counseling on depression, Erectile dysfunction and HTN      Patient Instructions     Orders Placed This Encounter     lisinopril (PRINIVIL/ZESTRIL) 20 MG tablet     Sig: Take 1 tablet (20 mg) by mouth daily     Dispense:  90 tablet              sildenafil (REVATIO) 20 MG tablet     Sig: Take 1 tablet (20 mg) prior to activity     Dispense:  10 tablet     Refill:  5     Ok to increase to 40 mg of lisinopril of BP not controlled      Return in about 3 months (around 3/12/2020) for BP Recheck.         Clarence Park MD, MD  [facility]

## 2019-12-13 VITALS
SYSTOLIC BLOOD PRESSURE: 137 MMHG | TEMPERATURE: 97.8 F | BODY MASS INDEX: 44.95 KG/M2 | DIASTOLIC BLOOD PRESSURE: 88 MMHG | HEIGHT: 70 IN | WEIGHT: 314 LBS

## 2019-12-13 ASSESSMENT — ANXIETY QUESTIONNAIRES: GAD7 TOTAL SCORE: 2

## 2020-01-06 DIAGNOSIS — F33.1 MODERATE EPISODE OF RECURRENT MAJOR DEPRESSIVE DISORDER (H): ICD-10-CM

## 2020-01-06 NOTE — TELEPHONE ENCOUNTER
"Requested Prescriptions   Pending Prescriptions Disp Refills     buPROPion (WELLBUTRIN SR) 150 MG 12 hr tablet [Pharmacy Med Name: BUPROPION HCL  MG TABLET]  Last Written Prescription Date:  10/9/2019  Last Fill Quantity: 180 tablet,  # refills: 0   Last office visit: 12/12/2019 with prescribing provider:  DARIA Park   Future Office Visit:   180 tablet 0     Sig: TAKE 1 TABLET BY MOUTH ONCE DAILY FOR 4 TO 7 DAYS, THEN TAKE 1 TABLET TWICE DAILY       SSRIs Protocol Failed - 1/6/2020  1:59 AM        Failed - Medication is active on med list        Passed - PHQ-9 score less than 5 in past 6 months     Please review last PHQ-9 score.   PHQ-9 SCORE 12/22/2016 7/12/2018 12/12/2019   PHQ-9 Total Score 3 1 1     THOMAS-7 SCORE 12/22/2016 7/12/2018 12/12/2019   Total Score 3 2 2           Passed - Medication is Bupropion     If the medication is Bupropion (Wellbutrin), and the patient is taking for smoking cessation; OK to refill.          Passed - Patient is age 18 or older        Passed - Recent (6 mo) or future (30 days) visit within the authorizing provider's specialty     Patient had office visit in the last 6 months or has a visit in the next 30 days with authorizing provider or within the authorizing provider's specialty.  See \"Patient Info\" tab in inbasket, or \"Choose Columns\" in Meds & Orders section of the refill encounter.            "

## 2020-01-07 RX ORDER — BUPROPION HYDROCHLORIDE 150 MG/1
TABLET, EXTENDED RELEASE ORAL
Qty: 180 TABLET | Refills: 0 | OUTPATIENT
Start: 2020-01-07

## 2020-01-07 NOTE — TELEPHONE ENCOUNTER
"See plan at last visit 12/12/19:    Return in about 3 months (around 3/12/2020) for BP Recheck.           Clarence Park MD, MD  [facility]    It appears patient stopped the wellbutrin as well per notes at that visit.    I called home number, wife answered, patient is at work but she manages his meds and states he did not request the wellbutrin, \"he went crazy on that one\".    \"Refusal\" routed back to pharmacy with note.    Rosalia Villalta RN  [facility]          "

## 2020-02-24 ENCOUNTER — TELEPHONE (OUTPATIENT)
Dept: FAMILY MEDICINE | Facility: CLINIC | Age: 50
End: 2020-02-24

## 2020-02-24 NOTE — TELEPHONE ENCOUNTER
He should really be seen. We aren't going to joy and change medications via phone for a patient. Needs an evaluation in clinic. If symptomatic, he should be seen in the ER today (headaches, chest pain, etc)    COLEEN Agarwal, PA-C

## 2020-02-24 NOTE — TELEPHONE ENCOUNTER
Routing to PCP to please advise      Christina is concerned for her  and very anxious.  He has been having higher BP for the past 2 months around 186/106, gives plasma at a center.    Currently he takes Metoprolol 50 mg daily.  She states DR Park switched to Lisinopril because it's cheaper for them.    He has about 60 pills of Metoprolol and will take Lisinopril when when these are gone    They are asking if he can be placed on additional BP medication like hydrochlorothiazide which is only 3$ a month.    He will go to a [facility] to have his BP checked.    They would like a phone visit to talk with PCP about blood pressure medication.    Did advise to go to either UC or ED, refused due to cost.  OK to leave detailed message.       Yvette Ace RN

## 2020-02-24 NOTE — TELEPHONE ENCOUNTER
Reason for call:  Patient reporting a symptom    Symptom or request: higher blood pressure    Duration (how long have symptoms been present): x2 months    Have you been treated for this before? No    Additional comments: Patient gives bloods and when donating blood, they have indicated that his blood pressure is running higher lately. Wife states blood pressure ranges in 168-186/. Wife is wondering about supplementary medication for higher blood pressure.    Phone Number patient can be reached at:  Home number on file [phone] (home),     Best Time:  Before 5 pm wife is at home and can speak, after 5 pm patient will be available.     Can we leave a detailed message on this number:  YES    Call taken on 2/24/2020 at 11:44 AM by Juwan Slade

## 2020-02-26 ENCOUNTER — ALLIED HEALTH/NURSE VISIT (OUTPATIENT)
Dept: FAMILY MEDICINE | Facility: CLINIC | Age: 50
End: 2020-02-26
Payer: COMMERCIAL

## 2020-02-26 VITALS — SYSTOLIC BLOOD PRESSURE: 120 MMHG | DIASTOLIC BLOOD PRESSURE: 93 MMHG

## 2020-02-26 DIAGNOSIS — I10 HYPERTENSION GOAL BP (BLOOD PRESSURE) < 140/90: Primary | ICD-10-CM

## 2020-02-26 PROCEDURE — 99207 ZZC NO CHARGE NURSE ONLY: CPT | Performed by: FAMILY MEDICINE

## 2020-02-26 NOTE — PROGRESS NOTES
Rakesh Carey was evaluated at [facility] on February 26, 2020 at which time his blood pressure was:    BP Readings from Last 3 Encounters:   02/26/20 (!) 120/93   12/13/19 137/88   06/27/19 134/88     Pulse Readings from Last 3 Encounters:   12/20/18 83   10/09/18 72   07/12/18 84       Reviewed lifestyle modifications for blood pressure control and reduction: including making healthy food choices, managing weight, getting regular exercise, smoking cessation, reducing alcohol consumption, monitoring blood pressure regularly.     Symptoms: None, patient was in donating plasma and had a BP reading 164/104 and was advised to get checked by the pharmacy. He had no symptoms.     BP Goal:< 140/90 mmHg    BP Assessment:  BP too high    Potential Reasons for BP too high: Dose of BP medication too low    BP Follow-Up Plan: Recheck BP in 30 days at pharmacy    Recommendation to Provider: Rakesh's diastolic is slightly elevated and he may benefit from an increase in lisinopril from 20mg to 30mg to see if that brings his diastolic to goal.  Suggested he recheck with pharmacy in 30 days to see if it is still elevated.    Note completed by: Nellie Grimm, PharmD  [facility]  PH: [phone]

## 2020-03-02 NOTE — TELEPHONE ENCOUNTER
Called and left message about reviewing medications  I will call again  Chart shows bp check on 2-26 was 120/93    Chao Park MD

## 2020-03-26 DIAGNOSIS — K21.00 GASTROESOPHAGEAL REFLUX DISEASE WITH ESOPHAGITIS: ICD-10-CM

## 2020-03-26 NOTE — TELEPHONE ENCOUNTER
"Requested Prescriptions   Pending Prescriptions Disp Refills     omeprazole (PRILOSEC) 20 MG DR capsule [Pharmacy Med Name: OMEPRAZOLE DR 20 MG CAPSULE]  Last Written Prescription Date:  6/27/2019  Last Fill Quantity: 60 capsule,  # refills: 9   Last office visit: 12/12/2019 with prescribing provider:  DARIA Park   Future Office Visit:   Next 5 appointments (look out 90 days)    Apr 30, 2020  6:00 PM CDT  PHYSICAL with Clarence Park MD  [facility] ([address] [address]  [phone]        180 capsule 3     Sig: TAKE 1 CAPSULE (20 MG) BY MOUTH 2 TIMES DAILY       PPI Protocol Passed - 3/26/2020  4:11 AM        Passed - Not on Clopidogrel (unless Pantoprazole ordered)        Passed - No diagnosis of osteoporosis on record        Passed - Recent (12 mo) or future (30 days) visit within the authorizing provider's specialty     Patient has had an office visit with the authorizing provider or a provider within the authorizing providers department within the previous 12 mos or has a future within next 30 days. See \"Patient Info\" tab in inbasket, or \"Choose Columns\" in Meds & Orders section of the refill encounter.              Passed - Medication is active on med list        Passed - Patient is age 18 or older           "

## 2020-07-02 ENCOUNTER — OFFICE VISIT (OUTPATIENT)
Dept: FAMILY MEDICINE | Facility: CLINIC | Age: 50
End: 2020-07-02
Payer: COMMERCIAL

## 2020-07-02 VITALS
HEIGHT: 70 IN | SYSTOLIC BLOOD PRESSURE: 134 MMHG | BODY MASS INDEX: 45.1 KG/M2 | DIASTOLIC BLOOD PRESSURE: 87 MMHG | TEMPERATURE: 98.4 F | RESPIRATION RATE: 16 BRPM | WEIGHT: 315 LBS | HEART RATE: 77 BPM

## 2020-07-02 DIAGNOSIS — Z12.11 SPECIAL SCREENING FOR MALIGNANT NEOPLASMS, COLON: ICD-10-CM

## 2020-07-02 DIAGNOSIS — G89.21 CHRONIC PAIN DUE TO TRAUMA: ICD-10-CM

## 2020-07-02 DIAGNOSIS — I10 HYPERTENSION GOAL BP (BLOOD PRESSURE) < 140/90: ICD-10-CM

## 2020-07-02 DIAGNOSIS — Z12.5 SPECIAL SCREENING FOR MALIGNANT NEOPLASM OF PROSTATE: ICD-10-CM

## 2020-07-02 DIAGNOSIS — Z00.00 ROUTINE GENERAL MEDICAL EXAMINATION AT A HEALTH CARE FACILITY: Primary | ICD-10-CM

## 2020-07-02 DIAGNOSIS — I10 ESSENTIAL HYPERTENSION WITH GOAL BLOOD PRESSURE LESS THAN 140/90: ICD-10-CM

## 2020-07-02 DIAGNOSIS — E78.5 HYPERLIPIDEMIA WITH TARGET LDL LESS THAN 130: ICD-10-CM

## 2020-07-02 PROCEDURE — 99396 PREV VISIT EST AGE 40-64: CPT | Mod: 25 | Performed by: FAMILY MEDICINE

## 2020-07-02 PROCEDURE — 80061 LIPID PANEL: CPT | Performed by: FAMILY MEDICINE

## 2020-07-02 PROCEDURE — G0103 PSA SCREENING: HCPCS | Performed by: FAMILY MEDICINE

## 2020-07-02 PROCEDURE — 84443 ASSAY THYROID STIM HORMONE: CPT | Performed by: FAMILY MEDICINE

## 2020-07-02 PROCEDURE — 80048 BASIC METABOLIC PNL TOTAL CA: CPT | Performed by: FAMILY MEDICINE

## 2020-07-02 PROCEDURE — 90471 IMMUNIZATION ADMIN: CPT | Performed by: FAMILY MEDICINE

## 2020-07-02 PROCEDURE — 90715 TDAP VACCINE 7 YRS/> IM: CPT | Performed by: FAMILY MEDICINE

## 2020-07-02 PROCEDURE — 36415 COLL VENOUS BLD VENIPUNCTURE: CPT | Performed by: FAMILY MEDICINE

## 2020-07-02 RX ORDER — METOPROLOL SUCCINATE 50 MG/1
50 TABLET, EXTENDED RELEASE ORAL DAILY
Qty: 90 TABLET | Refills: 3 | Status: SHIPPED | OUTPATIENT
Start: 2020-07-02 | End: 2021-06-10

## 2020-07-02 ASSESSMENT — MIFFLIN-ST. JEOR: SCORE: 2309.6
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
## 2020-07-02 NOTE — LETTER
[address] [id number] [phone]                                                                                                July 6, 2020    Rakesh CURRY Aurelio  [address]  [place] [address]        Dear Ramonita Aurelio     The results of your recent lab tests were overall good. The cholesterol is high. We will call to review.       Sincerely,       Clarence Park MD     Results for orders placed or performed in visit on 07/02/20   Basic metabolic panel  (Ca, Cl, CO2, Creat, Gluc, K, Na, BUN)     Status: None   Result Value Ref Range    Sodium 138 133 - 144 mmol/L    Potassium 3.9 3.4 - 5.3 mmol/L    Chloride 104 94 - 109 mmol/L    Carbon Dioxide 26 20 - 32 mmol/L    Anion Gap 8 3 - 14 mmol/L    Glucose 84 70 - 99 mg/dL    Urea Nitrogen 13 7 - 30 mg/dL    Creatinine 0.84 0.66 - 1.25 mg/dL    GFR Estimate >90 >60 mL/min/[1.73_m2]    GFR Estimate If Black >90 >60 mL/min/[1.73_m2]    Calcium 8.8 8.5 - 10.1 mg/dL   Lipid panel reflex to direct LDL Fasting     Status: Abnormal   Result Value Ref Range    Cholesterol 229 (H) <200 mg/dL    Triglycerides 102 <150 mg/dL    HDL Cholesterol 47 >39 mg/dL    LDL Cholesterol Calculated 162 (H) <100 mg/dL    Non HDL Cholesterol 182 (H) <130 mg/dL   PSA, screen     Status: None   Result Value Ref Range    PSA 0.64 0 - 4 ug/L   TSH with free T4 reflex     Status: None   Result Value Ref Range    TSH 2.07 0.40 - 4.00 mU/L

## 2020-07-03 LAB
ANION GAP SERPL CALCULATED.3IONS-SCNC: 8 MMOL/L (ref 3–14)
BUN SERPL-MCNC: 13 MG/DL (ref 7–30)
CALCIUM SERPL-MCNC: 8.8 MG/DL (ref 8.5–10.1)
CHLORIDE SERPL-SCNC: 104 MMOL/L (ref 94–109)
CHOLEST SERPL-MCNC: 229 MG/DL
CO2 SERPL-SCNC: 26 MMOL/L (ref 20–32)
CREAT SERPL-MCNC: 0.84 MG/DL (ref 0.66–1.25)
GFR SERPL CREATININE-BSD FRML MDRD: >90 ML/MIN/{1.73_M2}
GLUCOSE SERPL-MCNC: 84 MG/DL (ref 70–99)
HDLC SERPL-MCNC: 47 MG/DL
LDLC SERPL CALC-MCNC: 162 MG/DL
NONHDLC SERPL-MCNC: 182 MG/DL
POTASSIUM SERPL-SCNC: 3.9 MMOL/L (ref 3.4–5.3)
PSA SERPL-ACNC: 0.64 UG/L (ref 0–4)
SODIUM SERPL-SCNC: 138 MMOL/L (ref 133–144)
TRIGL SERPL-MCNC: 102 MG/DL
TSH SERPL DL<=0.005 MIU/L-ACNC: 2.07 MU/L (ref 0.4–4)

## 2020-07-05 ENCOUNTER — TELEPHONE (OUTPATIENT)
Dept: FAMILY MEDICINE | Facility: CLINIC | Age: 50
End: 2020-07-05

## 2020-07-05 DIAGNOSIS — Z12.11 SPECIAL SCREENING FOR MALIGNANT NEOPLASMS, COLON: ICD-10-CM

## 2020-07-05 DIAGNOSIS — E78.5 HYPERLIPIDEMIA WITH TARGET LDL LESS THAN 130: Primary | ICD-10-CM

## 2020-07-05 NOTE — TELEPHONE ENCOUNTER
"Please call patient    Lab tests are normal except for cholesterol. All other tests are normal.  .  Recent Labs   Lab Test 07/02/20  1754 06/27/19  1837  06/25/15  1833 06/19/14  1804   CHOL 229* 216*   < > 183 206*   HDL 47 40   < > 37* 35*   * 148*   < > 127 149*   TRIG 102 140   < > 96 111   CHOLHDLRATIO  --   --   --  4.9 5.9*    < > = values in this interval not displayed.     I would recommend starting a cholesterol medication    LDL is high          Order pended if he is agreeable.      Lipid goals:    Cholesterol: Desirable is less than 200, Borderline is 200-240  HDL (Good Cholesterol): Desirable is greater than 40  LDL (Bad Cholesterol): Desirable is less than 130, Borderline 130-160  Triglycerides: Desirable is less than 150,  Borderline is 150-300    Order pended if he agrees.    You can also review good diet    Ways to improve your cholesterol...    1- Eats less saturated fats (including avoiding \"trans\" fats).    2 - Eat more unsaturated fats  - found in vegetables, grains, and tree nuts.   Also by replacing butter with canola oil or olive oil.    3 - Eat more nuts.   1-2 ounces (a small handful) of almonds, walnuts, hazelnuts or pecans once a  day in place of other less healthy snacks.    4 - Eat more high fiber foods - vegetables and whole grains including oat bran, oats, beans, peas, and flax seed.    5 - Eat more fish - such as salmon, tuna, mackerel, and sardines.  1 or 2 six ounce servings per week is a healthy replacement for other proteins.    6 - Exercise for at least 120 minutes per week - which is equal to 30 minutes 4 days per week.      Chao Park MD      "

## 2020-07-06 RX ORDER — ATORVASTATIN CALCIUM 20 MG/1
20 TABLET, FILM COATED ORAL DAILY
Qty: 90 TABLET | Refills: 3 | Status: SHIPPED | OUTPATIENT
Start: 2020-07-06 | End: 2021-06-10

## 2020-07-06 NOTE — TELEPHONE ENCOUNTER
Routing request for colonoscopy to providers' pool due to PCP being out of office.    Patient saw Dr. Park on 7/2, and he agreed to a cologard testing.  However, he prefers to have a regular colonoscopy instead.    Order pended if agreeable.     Patient verbalized understanding of below message and agreed to plan. Pharmacy confirmed and RX sent.    Mega Buitrago RN

## 2020-07-06 NOTE — TELEPHONE ENCOUNTER
Attempted to call patient at number listed below.    Busy signal x2 attempts    Will keep bolded to try again      Yvette Ace RN

## 2020-07-06 NOTE — TELEPHONE ENCOUNTER
Patient/family was instructed to return call to [facility], directly on the RN Call back line at [phone].    Mega Buitrago RN

## 2020-07-06 NOTE — TELEPHONE ENCOUNTER
Patient called back on RN line .  He is requesting RN to call him at work at [phone].    Mega Buitrago RN

## 2020-07-06 NOTE — TELEPHONE ENCOUNTER
Patient/family was instructed to return call to [facility], directly on the RN Call back line at [phone].    Mega Buitrago RN

## 2020-07-07 NOTE — TELEPHONE ENCOUNTER
Order placed    Notify patient that he can call [facility] and set up an appt    [facility]:  [phone]

## 2020-07-07 NOTE — PATIENT INSTRUCTIONS
Orders Placed This Encounter     HYDROcodone-acetaminophen (NORCO) 5-325 MG per tablet     Sig: Take 1 tablet by mouth every 6 hours as needed for pain     Dispense:  60 tablet     Refill:  0     HYDROcodone-acetaminophen (NORCO) 5-325 MG per tablet     Sig: Take 1 tablet by mouth every 6 hours as needed for pain     Dispense:  60 tablet     Refill:  0     HYDROcodone-acetaminophen (NORCO) 5-325 MG per tablet     Sig: Take 1 tablet by mouth every 6 hours as needed for pain     Dispense:  60 tablet     Refill:  0        multiple medical problems (COPD/Parkinsons) no acute issues, COVID test pending

## 2020-08-06 ENCOUNTER — TRANSFERRED RECORDS (OUTPATIENT)
Dept: HEALTH INFORMATION MANAGEMENT | Facility: CLINIC | Age: 50
End: 2020-08-06

## 2020-09-30 ENCOUNTER — TELEPHONE (OUTPATIENT)
Dept: FAMILY MEDICINE | Facility: CLINIC | Age: 50
End: 2020-09-30

## 2020-09-30 NOTE — TELEPHONE ENCOUNTER
Reason for Call:  Medication or medication refill:    Do you use a [facility]?  Name of the pharmacy and phone number for the current request:  [facility] - [address], [place], [place]    Name of the medication requested: Colonscopy Kit    Other request: Spouse called and stated patient has a colonoscopy coming up and they need Dr. Park to order his pre-colonoscopy kit.     Can we leave a detailed message on this number? YES    Phone number patient can be reached at: Christina: [phone]    Best Time: Anytime    Call taken on 9/30/2020 at 8:45 AM by Shi Larios

## 2020-09-30 NOTE — TELEPHONE ENCOUNTER
Called patient's wife and advised she reach out to the GI provider that will be doing the patient's colonoscopy regarding their preferred prep for the procedure. Advised patient and wife the prep is dependant on the provider doing the procedure. They verbalized understanding.    Dede Kay RN

## 2020-12-11 ENCOUNTER — TRANSFERRED RECORDS (OUTPATIENT)
Dept: HEALTH INFORMATION MANAGEMENT | Facility: CLINIC | Age: 50
End: 2020-12-11

## 2020-12-31 ENCOUNTER — OFFICE VISIT (OUTPATIENT)
Dept: FAMILY MEDICINE | Facility: CLINIC | Age: 50
End: 2020-12-31
Payer: COMMERCIAL

## 2020-12-31 VITALS
OXYGEN SATURATION: 97 % | WEIGHT: 315 LBS | BODY MASS INDEX: 45.1 KG/M2 | TEMPERATURE: 98.2 F | HEART RATE: 73 BPM | HEIGHT: 70 IN | DIASTOLIC BLOOD PRESSURE: 88 MMHG | SYSTOLIC BLOOD PRESSURE: 138 MMHG

## 2020-12-31 DIAGNOSIS — E66.01 OBESITY, MORBID, BMI 40.0-49.9 (H): Primary | ICD-10-CM

## 2020-12-31 DIAGNOSIS — M54.16 LUMBAR BACK PAIN WITH RADICULOPATHY AFFECTING LEFT LOWER EXTREMITY: ICD-10-CM

## 2020-12-31 PROCEDURE — 99214 OFFICE O/P EST MOD 30 MIN: CPT | Performed by: FAMILY MEDICINE

## 2020-12-31 RX ORDER — HYDROCODONE BITARTRATE AND ACETAMINOPHEN 5; 325 MG/1; MG/1
1 TABLET ORAL EVERY 6 HOURS PRN
Qty: 10 TABLET | Refills: 0 | Status: SHIPPED | OUTPATIENT
Start: 2020-12-31 | End: 2021-06-10

## 2020-12-31 RX ORDER — METHYLPREDNISOLONE 4 MG
TABLET, DOSE PACK ORAL
Qty: 21 TABLET | Refills: 0 | Status: SHIPPED | OUTPATIENT
Start: 2020-12-31 | End: 2021-06-10

## 2020-12-31 SDOH — HEALTH STABILITY: MENTAL HEALTH: HOW MANY STANDARD DRINKS CONTAINING ALCOHOL DO YOU HAVE ON A TYPICAL DAY?: NOT ASKED

## 2020-12-31 SDOH — HEALTH STABILITY: MENTAL HEALTH: HOW OFTEN DO YOU HAVE A DRINK CONTAINING ALCOHOL?: NOT ASKED

## 2020-12-31 SDOH — HEALTH STABILITY: MENTAL HEALTH: HOW OFTEN DO YOU HAVE 6 OR MORE DRINKS ON ONE OCCASION?: NOT ASKED

## 2020-12-31 ASSESSMENT — PATIENT HEALTH QUESTIONNAIRE - PHQ9
5. POOR APPETITE OR OVEREATING: NOT AT ALL
SUM OF ALL RESPONSES TO PHQ QUESTIONS 1-9: 2

## 2020-12-31 ASSESSMENT — ANXIETY QUESTIONNAIRES
5. BEING SO RESTLESS THAT IT IS HARD TO SIT STILL: NOT AT ALL
7. FEELING AFRAID AS IF SOMETHING AWFUL MIGHT HAPPEN: NOT AT ALL
3. WORRYING TOO MUCH ABOUT DIFFERENT THINGS: SEVERAL DAYS
1. FEELING NERVOUS, ANXIOUS, OR ON EDGE: SEVERAL DAYS
6. BECOMING EASILY ANNOYED OR IRRITABLE: SEVERAL DAYS
2. NOT BEING ABLE TO STOP OR CONTROL WORRYING: SEVERAL DAYS
GAD7 TOTAL SCORE: 4
IF YOU CHECKED OFF ANY PROBLEMS ON THIS QUESTIONNAIRE, HOW DIFFICULT HAVE THESE PROBLEMS MADE IT FOR YOU TO DO YOUR WORK, TAKE CARE OF THINGS AT HOME, OR GET ALONG WITH OTHER PEOPLE: NOT DIFFICULT AT ALL

## 2020-12-31 ASSESSMENT — MIFFLIN-ST. JEOR: SCORE: 2376.73

## 2020-12-31 NOTE — PROGRESS NOTES
"Subjective     Rakesh Carey is a 50 year old male who presents to clinic today for the following health issues:    HPI         New Patient/Transfer of Care - used to live in [place]. Previous doctor retired.    Weight Management - patient interested in help with weight loss. Hasn't been exercising as much because he used to walk in the mall but doesn't feel comfortable doing that right now due to COVID-19. His wife eats a low carb diet and as such, he does too. He is wondering about possible appetite suppressants or other options for weight loss. Hasn't tried anything else.    Pain management - low back pain for years - fell yesterday in parking lot. Landed on his left hip and knee and then down. He has history of degenerative changes in lumbar spine. In the past has been to physical therapy and chiro with minor relief. Doesn't like to take ibuprofen due to kidney issues in the past and Tylenol doesn't seem to help. Heat is helpful as is hot shower. Has taken Flexeril but this makes him fatigued, even into the next day. Was previous on opiate medication for chronic ankle pain, however, has been able to get off regular medication.    XR Lumbar Spine (8/21/2013):  IMPRESSION:  1. Moderate facet articular degenerative changes mid and lower lumbar  spine.  2. Moderate degenerative narrowing of the L1-2, L2-3 and lumbosacral  disc spaces.  3. Vertebral bodies are intact, no malalignment.      Review of Systems   Constitutional, HEENT, cardiovascular, pulmonary, gi and gu systems are negative, except as otherwise noted.      Objective    /88   Pulse 73   Temp 98.2  F (36.8  C) (Tympanic)   Ht 1.778 m (5' 10\")   Wt (!) 151 kg (333 lb)   SpO2 97%   BMI 47.78 kg/m    Body mass index is 47.78 kg/m .  Physical Exam   GENERAL: healthy, alert and no distress  RESP: lungs clear to auscultation - no rales, rhonchi or wheezes  CV: regular rate and rhythm, normal S1 S2, no S3 or S4, no murmur, click " "or rub, no peripheral edema and peripheral pulses strong  MS: no gross musculoskeletal defects noted, no edema    Previous XR results reviewed as above.        Assessment & Plan     Obesity, morbid, BMI 40.0-49.9 (H): given weight, consultation with weight loss clinic would be recommended. He may be candidate for bariatric surgery due to BMI. Can go over various options for weight loss assistance  - COMPREHENSIVE WEIGHT MANAGEMENT    Lumbar back pain with radiculopathy affecting left lower extremity: discussed that opiates are not typically recommended for acute on chronic symptoms. Will treat with small dose of Norco if needed for severe pain. Start oral steroid. If symptoms not improving or worsening, would recommend consultation with ortho and.or advanced imaging.  - HYDROcodone-acetaminophen (NORCO) 5-325 MG tablet; Take 1 tablet by mouth every 6 hours as needed for severe pain  - methylPREDNISolone (MEDROL DOSEPAK) 4 MG tablet therapy pack; Follow Package Directions     BMI:   Estimated body mass index is 47.78 kg/m  as calculated from the following:    Height as of this encounter: 1.778 m (5' 10\").    Weight as of this encounter: 151 kg (333 lb).   Weight management plan: Patient referred to endocrine and/or weight management specialty           Return if symptoms worsen or fail to improve.    Carrington Rowe, [facility] [place]    "

## 2021-01-01 ASSESSMENT — ANXIETY QUESTIONNAIRES: GAD7 TOTAL SCORE: 4

## 2021-04-02 DIAGNOSIS — K21.00 GASTROESOPHAGEAL REFLUX DISEASE WITH ESOPHAGITIS WITHOUT HEMORRHAGE: Primary | ICD-10-CM

## 2021-04-02 DIAGNOSIS — K21.00 GASTROESOPHAGEAL REFLUX DISEASE WITH ESOPHAGITIS: ICD-10-CM

## 2021-04-05 NOTE — TELEPHONE ENCOUNTER
Reviewing refill request since Dr. Park has retired from practice.    Will send request to Dr. Rowe as it appears patient has established care with that provider.    Gilma Pierson, DNP, APRN, CNP

## 2021-06-10 ENCOUNTER — OFFICE VISIT (OUTPATIENT)
Dept: FAMILY MEDICINE | Facility: CLINIC | Age: 51
End: 2021-06-10
Payer: COMMERCIAL

## 2021-06-10 ENCOUNTER — IMMUNIZATION (OUTPATIENT)
Dept: LAB | Facility: CLINIC | Age: 51
End: 2021-06-10
Payer: COMMERCIAL

## 2021-06-10 VITALS
HEART RATE: 84 BPM | OXYGEN SATURATION: 95 % | BODY MASS INDEX: 45.1 KG/M2 | HEIGHT: 70 IN | DIASTOLIC BLOOD PRESSURE: 86 MMHG | SYSTOLIC BLOOD PRESSURE: 132 MMHG | RESPIRATION RATE: 14 BRPM | TEMPERATURE: 98.9 F | WEIGHT: 315 LBS

## 2021-06-10 DIAGNOSIS — M54.16 LUMBAR BACK PAIN WITH RADICULOPATHY AFFECTING LEFT LOWER EXTREMITY: ICD-10-CM

## 2021-06-10 DIAGNOSIS — B00.1 RECURRENT COLD SORES: ICD-10-CM

## 2021-06-10 DIAGNOSIS — I10 ESSENTIAL HYPERTENSION WITH GOAL BLOOD PRESSURE LESS THAN 140/90: ICD-10-CM

## 2021-06-10 DIAGNOSIS — Z00.00 ROUTINE GENERAL MEDICAL EXAMINATION AT A HEALTH CARE FACILITY: Primary | ICD-10-CM

## 2021-06-10 DIAGNOSIS — E55.9 VITAMIN D DEFICIENCY: ICD-10-CM

## 2021-06-10 DIAGNOSIS — E66.01 OBESITY, MORBID, BMI 40.0-49.9 (H): ICD-10-CM

## 2021-06-10 DIAGNOSIS — E78.5 HYPERLIPIDEMIA WITH TARGET LDL LESS THAN 130: ICD-10-CM

## 2021-06-10 DIAGNOSIS — Z13.1 SCREENING FOR DIABETES MELLITUS: ICD-10-CM

## 2021-06-10 DIAGNOSIS — Z12.5 SCREENING FOR PROSTATE CANCER: ICD-10-CM

## 2021-06-10 LAB — HBA1C MFR BLD: 5.6 % (ref 0–5.6)

## 2021-06-10 PROCEDURE — 83036 HEMOGLOBIN GLYCOSYLATED A1C: CPT | Performed by: FAMILY MEDICINE

## 2021-06-10 PROCEDURE — 80061 LIPID PANEL: CPT | Performed by: FAMILY MEDICINE

## 2021-06-10 PROCEDURE — 99214 OFFICE O/P EST MOD 30 MIN: CPT | Mod: 25 | Performed by: FAMILY MEDICINE

## 2021-06-10 PROCEDURE — 84443 ASSAY THYROID STIM HORMONE: CPT | Performed by: FAMILY MEDICINE

## 2021-06-10 PROCEDURE — 36415 COLL VENOUS BLD VENIPUNCTURE: CPT | Performed by: FAMILY MEDICINE

## 2021-06-10 PROCEDURE — 99396 PREV VISIT EST AGE 40-64: CPT | Performed by: FAMILY MEDICINE

## 2021-06-10 PROCEDURE — G0103 PSA SCREENING: HCPCS | Performed by: FAMILY MEDICINE

## 2021-06-10 PROCEDURE — 82306 VITAMIN D 25 HYDROXY: CPT | Performed by: FAMILY MEDICINE

## 2021-06-10 PROCEDURE — 80053 COMPREHEN METABOLIC PANEL: CPT | Performed by: FAMILY MEDICINE

## 2021-06-10 RX ORDER — ATORVASTATIN CALCIUM 20 MG/1
20 TABLET, FILM COATED ORAL DAILY
Qty: 90 TABLET | Refills: 3 | Status: SHIPPED | OUTPATIENT
Start: 2021-06-10 | End: 2022-07-12

## 2021-06-10 RX ORDER — METHYLPREDNISOLONE 4 MG
TABLET, DOSE PACK ORAL
Qty: 21 TABLET | Refills: 0 | Status: SHIPPED | OUTPATIENT
Start: 2021-06-10 | End: 2022-06-01

## 2021-06-10 RX ORDER — METOPROLOL SUCCINATE 50 MG/1
50 TABLET, EXTENDED RELEASE ORAL DAILY
Qty: 90 TABLET | Refills: 3 | Status: SHIPPED | OUTPATIENT
Start: 2021-06-10 | End: 2022-05-28

## 2021-06-10 RX ORDER — HYDROCODONE BITARTRATE AND ACETAMINOPHEN 5; 325 MG/1; MG/1
1 TABLET ORAL EVERY 6 HOURS PRN
Qty: 10 TABLET | Refills: 0 | Status: SHIPPED | OUTPATIENT
Start: 2021-06-10 | End: 2022-06-13

## 2021-06-10 RX ORDER — ACYCLOVIR 400 MG/1
400 TABLET ORAL 3 TIMES DAILY
Qty: 30 TABLET | Refills: 5 | Status: SHIPPED | OUTPATIENT
Start: 2021-06-10 | End: 2023-06-19

## 2021-06-10 ASSESSMENT — PAIN SCALES - GENERAL: PAINLEVEL: NO PAIN (0)

## 2021-06-10 ASSESSMENT — MIFFLIN-ST. JEOR: SCORE: 2367.19

## 2021-06-10 NOTE — PROGRESS NOTES
SUBJECTIVE:   CC: Rakesh Carey is an 51 year old male who presents for preventative health visit.     Patient has been advised of split billing requirements and indicates understanding: Yes     Healthy Habits:     Getting at least 3 servings of Calcium per day:  Yes    Bi-annual eye exam:  Yes    Dental care twice a year:  Yes    Sleep apnea or symptoms of sleep apnea:  Excessive snoring    Diet:  Regular (no restrictions), Low salt and Carbohydrate counting    Frequency of exercise:  None    Taking medications regularly:  Yes    Medication side effects:  None    PHQ-2 Total Score: 1    Additional concerns today:  No      Hyperlipidemia Follow-Up      Are you regularly taking any medication or supplement to lower your cholesterol?   Yes- Statin    Are you having muscle aches or other side effects that you think could be caused by your cholesterol lowering medication?  No    Hypertension Follow-up      Do you check your blood pressure regularly outside of the clinic? No     Are you following a low salt diet? Yes    Are your blood pressures ever more than 140 on the top number (systolic) OR more   than 90 on the bottom number (diastolic), for example 140/90? Yes     Denies any headaches, lightheadedness, dizziness, vision changes, chest pain, palpitations, shortness of breath, dyspnea, numbness/tingling.      BP Readings from Last 6 Encounters:   06/10/21 132/86   12/31/20 138/88   07/02/20 134/87   02/26/20 (!) 120/93   12/13/19 137/88   06/27/19 134/88         Today's PHQ-2 Score:   PHQ-2 ( 1999 [employer]) 6/10/2021   Q1: Little interest or pleasure in doing things 1   Q2: Feeling down, depressed or hopeless 0   PHQ-2 Score 1   Q1: Little interest or pleasure in doing things Several days   Q2: Feeling down, depressed or hopeless Not at all   PHQ-2 Score 1       Abuse: Current or Past(Physical, Sexual or Emotional)- NO  Do you feel safe in your environment? YES    Have you ever done Advance Care Planning? (For  example, a Health Directive, POLST, or a discussion with a medical provider or your loved ones about your wishes): Yes, patient states has an Advance Care Planning document and will bring a copy to the clinic.    Social History     Tobacco Use     Smoking status: Former Smoker     Packs/day: 0.25     Years: 20.00     Pack years: 5.00     Types: Cigarettes     Quit date: 2013     Years since quittin.0     Smokeless tobacco: Never Used     Tobacco comment: <1/2 ppd   Substance Use Topics     Alcohol use: Yes     If you drink alcohol do you typically have >3 drinks per day or >7 drinks per week? No    Alcohol Use 6/10/2021   Prescreen: >3 drinks/day or >7 drinks/week? No   Prescreen: >3 drinks/day or >7 drinks/week? -       Last PSA:   PSA   Date Value Ref Range Status   2020 0.64 0 - 4 ug/L Final     Comment:     Assay Method:  Chemiluminescence using Siemens Vista analyzer       Reviewed orders with patient. Reviewed health maintenance and updated orders accordingly - Yes  Lab work is in process    Reviewed and updated as needed this visit by clinical staff  Tobacco   Meds  Problems             Reviewed and updated as needed this visit by Provider     Problems              Review of Systems  CONSTITUTIONAL: NEGATIVE for fever, chills, change in weight  INTEGUMENTARY/SKIN: NEGATIVE for worrisome rashes, moles or lesions  EYES: NEGATIVE for vision changes or irritation  ENT: NEGATIVE for ear, mouth and throat problems  RESP: NEGATIVE for significant cough or SOB  CV: NEGATIVE for chest pain, palpitations or peripheral edema  GI: NEGATIVE for nausea, abdominal pain, heartburn, or change in bowel habits   male: negative for dysuria, hematuria, decreased urinary stream, erectile dysfunction, urethral discharge  MUSCULOSKELETAL: NEGATIVE for significant arthralgias or myalgia  NEURO: NEGATIVE for weakness, dizziness or paresthesias  PSYCHIATRIC: NEGATIVE for changes in mood or affect    OBJECTIVE:   BP  "132/86   Pulse 84   Temp 98.9  F (37.2  C) (Tympanic)   Resp 14   Ht 1.778 m (5' 10\")   Wt (!) 150.6 kg (332 lb)   SpO2 95%   BMI 47.64 kg/m      Physical Exam  GENERAL: healthy, alert and no distress  EYES: Eyes grossly normal to inspection, PERRL and conjunctivae and sclerae normal  HENT: ear canals and TM's normal, nose and mouth without ulcers or lesions  NECK: no adenopathy, no asymmetry, masses, or scars and thyroid normal to palpation  RESP: lungs clear to auscultation - no rales, rhonchi or wheezes  CV: regular rate and rhythm, normal S1 S2, no S3 or S4, no murmur, click or rub, no peripheral edema and peripheral pulses strong  ABDOMEN: soft, nontender, no hepatosplenomegaly, no masses and bowel sounds normal  MS: no gross musculoskeletal defects noted, no edema  SKIN: no suspicious lesions or rashes  PSYCH: mentation appears normal, affect normal/bright    Diagnostic Test Results:  Labs reviewed in Epic    ASSESSMENT/PLAN:   1. Routine general medical examination at a health care facility: health maintenance reviewed and updated.    2. Recurrent cold sores: stable. Continue acyclovir.  - acyclovir (ZOVIRAX) 400 MG tablet; Take 1 tablet (400 mg) by mouth 3 times daily  Dispense: 30 tablet; Refill: 5    3. Hyperlipidemia with target LDL less than 130: stable, continue atorvastatin. Will recheck lipids as well as LFTs.  - atorvastatin (LIPITOR) 20 MG tablet; Take 1 tablet (20 mg) by mouth daily  Dispense: 90 tablet; Refill: 3  - Lipid panel reflex to direct LDL Fasting    4. Essential hypertension with goal blood pressure less than 140/90: Bp controlled. Continue low salt diet. Try to increase exercise and lose weight.  - metoprolol succinate ER (TOPROL-XL) 50 MG 24 hr tablet; Take 1 tablet (50 mg) by mouth daily  Dispense: 90 tablet; Refill: 3  - Comprehensive metabolic panel (BMP + Alb, Alk Phos, ALT, AST, Total. Bili, TP)    5. Obesity, morbid, BMI 40.0-49.9 (H)  - TSH with free T4 reflex  - Vitamin " "D Deficiency    6. Vitamin D deficiency: will recheck vitamin D.    7. Screening for diabetes mellitus  - Comprehensive metabolic panel (BMP + Alb, Alk Phos, ALT, AST, Total. Bili, TP)  - Hemoglobin A1c    8. Screening for prostate cancer  - PSA, screen    9. Lumbar back pain with radiculopathy affecting left lower extremity: again discussed that opiates are not advised for chronic pain. Will give oral steroid to have on hand. If symptoms worsening, would then consider imaging and referral.  - methylPREDNISolone (MEDROL DOSEPAK) 4 MG tablet therapy pack; Follow Package Directions  Dispense: 21 tablet; Refill: 0  - HYDROcodone-acetaminophen (NORCO) 5-325 MG tablet; Take 1 tablet by mouth every 6 hours as needed for severe pain  Dispense: 10 tablet; Refill: 0    Patient has been advised of split billing requirements and indicates understanding: Yes  COUNSELING:   Reviewed preventive health counseling, as reflected in patient instructions       Regular exercise       Healthy diet/nutrition    Estimated body mass index is 47.64 kg/m  as calculated from the following:    Height as of this encounter: 1.778 m (5' 10\").    Weight as of this encounter: 150.6 kg (332 lb).     Weight management plan: Discussed healthy diet and exercise guidelines    He reports that he quit smoking about 8 years ago. His smoking use included cigarettes. He has a 5.00 pack-year smoking history. He has never used smokeless tobacco.      Counseling Resources:  ATP IV Guidelines  Pooled Cohorts Equation Calculator  FRAX Risk Assessment  ICSI Preventive Guidelines  Dietary Guidelines for Americans, 2010  USDA's MyPlate  ASA Prophylaxis  Lung CA Screening    Carrington Rowe DO  [facility] [place]  "

## 2021-06-10 NOTE — PATIENT INSTRUCTIONS
Preventive Health Recommendations  Male Ages 50 - 64    Yearly exam:             See your health care provider every year in order to  o   Review health changes.   o   Discuss preventive care.    o   Review your medicines if your doctor has prescribed any.     Have a cholesterol test every 5 years, or more frequently if you are at risk for high cholesterol/heart disease.     Have a diabetes test (fasting glucose) every three years. If you are at risk for diabetes, you should have this test more often.     Have a colonoscopy at age 50, or have a yearly FIT test (stool test). These exams will check for colon cancer.      Talk with your health care provider about whether or not a prostate cancer screening test (PSA) is right for you.    You should be tested each year for STDs (sexually transmitted diseases), if you re at risk.     Shots: Get a flu shot each year. Get a tetanus shot every 10 years.     Nutrition:    Eat at least 5 servings of fruits and vegetables daily.     Eat whole-grain bread, whole-wheat pasta and brown rice instead of white grains and rice.     Get adequate Calcium and Vitamin D.     Lifestyle    Exercise for at least 150 minutes a week (30 minutes a day, 5 days a week). This will help you control your weight and prevent disease.     Limit alcohol to one drink per day.     No smoking.     Wear sunscreen to prevent skin cancer.     See your dentist every six months for an exam and cleaning.     See your eye doctor every 1 to 2 years.         Your provider has referred you to: VI: Comprehensive Weight Management Program - [place] [phone] [email or web address]

## 2021-06-10 NOTE — LETTER
[facility]  [address] [place] [address]  [phone]                    June 16, 2021    Rakesh Carey  [address]  [facility][1] [id number]      Dear Rakesh,    Here is a summary of your recent test results:    -PSA (prostate specific antigen) test is normal.  This indicates a low likelihood of prostate cancer.  ADVISE: rechecking this in 1 year.   -Cholesterol levels are at your goal levels.  ADVISE: continuing your medication, a regular exercise program with at least 150 minutes of aerobic exercise per week, and eating a low saturated fat/low carbohydrate diet.  Also, you should recheck this fasting cholesterol panel in 12 months.   -Liver and gallbladder tests are normal (ALT,AST, Alk phos, bilirubin), kidney function is normal (Cr, GFR), sodium is normal, potassium is normal, calcium is normal, glucose is normal.   -A1C (diabetic test) is normal and indicates that your blood sugar has been in a normal range the last 3 months.   -TSH (thyroid stimulating hormone) level is normal which indicates normal thyroid function.   -Vitamin D level is on the low end of normal. Recommend getting 2000 IU daily in your diet or supplements is recommended.     Your test results are enclosed.      Please contact me if you have any questions.    In addition, here is a list of due or overdue Health Maintenance reminders.    Health Maintenance Due   Topic Date Due     URINE DRUG SCREEN  Never done     Depression Action Plan  Never done     Zoster (Shingles) Vaccine (1 of 2) Never done     Preventive Care Visit  07/02/2021       Please call us at [phone] (or use [employer]) to address the above recommendations.            Thank you very much for trusting [facility].     Healthy regards,    Dr. Carrington Rowe, DO           Results for orders placed or performed in visit on 06/10/21   Comprehensive metabolic panel (BMP + Alb, Alk Phos, ALT, AST, Total.  Bili, TP)     Status: None   Result Value Ref Range    Sodium 138 133 - 144 mmol/L    Potassium 3.9 3.4 - 5.3 mmol/L    Chloride 106 94 - 109 mmol/L    Carbon Dioxide 26 20 - 32 mmol/L    Anion Gap 6 3 - 14 mmol/L    Glucose 74 70 - 99 mg/dL    Urea Nitrogen 14 7 - 30 mg/dL    Creatinine 0.86 0.66 - 1.25 mg/dL    GFR Estimate >90 >60 mL/min/[1.73_m2]    GFR Estimate If Black >90 >60 mL/min/[1.73_m2]    Calcium 8.9 8.5 - 10.1 mg/dL    Bilirubin Total 0.7 0.2 - 1.3 mg/dL    Albumin 3.8 3.4 - 5.0 g/dL    Protein Total 7.6 6.8 - 8.8 g/dL    Alkaline Phosphatase 49 40 - 150 U/L    ALT 36 0 - 70 U/L    AST 22 0 - 45 U/L   Lipid panel reflex to direct LDL Fasting     Status: None   Result Value Ref Range    Cholesterol 169 <200 mg/dL    Triglycerides 131 <150 mg/dL    HDL Cholesterol 45 >39 mg/dL    LDL Cholesterol Calculated 98 <100 mg/dL    Non HDL Cholesterol 124 <130 mg/dL   PSA, screen     Status: None   Result Value Ref Range    PSA 0.72 0 - 4 ug/L   TSH with free T4 reflex     Status: None   Result Value Ref Range    TSH 1.92 0.40 - 4.00 mU/L   Vitamin D Deficiency     Status: None   Result Value Ref Range    Vitamin D Deficiency screening 23 20 - 75 ug/L   Hemoglobin A1c     Status: None   Result Value Ref Range    Hemoglobin A1C 5.6 0 - 5.6 %

## 2021-06-11 LAB
ALBUMIN SERPL-MCNC: 3.8 G/DL (ref 3.4–5)
ALP SERPL-CCNC: 49 U/L (ref 40–150)
ALT SERPL W P-5'-P-CCNC: 36 U/L (ref 0–70)
ANION GAP SERPL CALCULATED.3IONS-SCNC: 6 MMOL/L (ref 3–14)
AST SERPL W P-5'-P-CCNC: 22 U/L (ref 0–45)
BILIRUB SERPL-MCNC: 0.7 MG/DL (ref 0.2–1.3)
BUN SERPL-MCNC: 14 MG/DL (ref 7–30)
CALCIUM SERPL-MCNC: 8.9 MG/DL (ref 8.5–10.1)
CHLORIDE SERPL-SCNC: 106 MMOL/L (ref 94–109)
CHOLEST SERPL-MCNC: 169 MG/DL
CO2 SERPL-SCNC: 26 MMOL/L (ref 20–32)
CREAT SERPL-MCNC: 0.86 MG/DL (ref 0.66–1.25)
DEPRECATED CALCIDIOL+CALCIFEROL SERPL-MC: 23 UG/L (ref 20–75)
GFR SERPL CREATININE-BSD FRML MDRD: >90 ML/MIN/{1.73_M2}
GLUCOSE SERPL-MCNC: 74 MG/DL (ref 70–99)
HDLC SERPL-MCNC: 45 MG/DL
LDLC SERPL CALC-MCNC: 98 MG/DL
NONHDLC SERPL-MCNC: 124 MG/DL
POTASSIUM SERPL-SCNC: 3.9 MMOL/L (ref 3.4–5.3)
PROT SERPL-MCNC: 7.6 G/DL (ref 6.8–8.8)
PSA SERPL-ACNC: 0.72 UG/L (ref 0–4)
SODIUM SERPL-SCNC: 138 MMOL/L (ref 133–144)
TRIGL SERPL-MCNC: 131 MG/DL
TSH SERPL DL<=0.005 MIU/L-ACNC: 1.92 MU/L (ref 0.4–4)

## 2021-08-19 ENCOUNTER — OFFICE VISIT (OUTPATIENT)
Dept: FAMILY MEDICINE | Facility: CLINIC | Age: 51
End: 2021-08-19
Payer: COMMERCIAL

## 2021-08-19 VITALS
TEMPERATURE: 97.1 F | RESPIRATION RATE: 18 BRPM | SYSTOLIC BLOOD PRESSURE: 130 MMHG | BODY MASS INDEX: 45.1 KG/M2 | HEIGHT: 70 IN | OXYGEN SATURATION: 97 % | WEIGHT: 315 LBS | DIASTOLIC BLOOD PRESSURE: 82 MMHG | HEART RATE: 97 BPM

## 2021-08-19 DIAGNOSIS — E66.01 OBESITY, MORBID, BMI 40.0-49.9 (H): Primary | ICD-10-CM

## 2021-08-19 PROCEDURE — 99213 OFFICE O/P EST LOW 20 MIN: CPT | Performed by: FAMILY MEDICINE

## 2021-08-19 ASSESSMENT — MIFFLIN-ST. JEOR: SCORE: 2411.19

## 2021-08-19 NOTE — PROGRESS NOTES
Assessment & Plan     Obesity, morbid, BMI 40.0-49.9 (H): given BMI and weight gain, I think consultation with weight loss is a good idea. In the meantime, we also discussed some other recommendations including getting protein in his breakfast meal, replacing chips with veggies/fruit at lunch and trying to skip the snacks before dinner. Also recommend trying [employer] miguel to help track calories and activity. Since getting exercise is difficult, challenged him to start slow -- even if he can get out for a 10-15 minute walk for starters.  - Comprehensive Weight Management; Future    No follow-ups on file.    Carrington Rowe [facility] ROSALINDA Cameron is a 51 year old who presents for the following health issues     HPI         Weight Management - patient interested in help with weight loss. He states he eats a low-sodium, low-carbohydrate diet. He doesn't drink soda but only carbonated water. He doesn't exercise because he works a lot (14 hour days) and is too tired when he gets home. He also notes that it is hard to get going on exercise due to his weight.    He is interested in the sleeve gastrectomy procedure.    He feels like his diet is pretty well balanced. He usually only has a banana for breakfast and then lunch might be a sandwich and a small bag of chips. He states that when he gets home, will snack -- granola bars, cereal bars - probably eats too many of them before dinner.      Hyperlipidemia Follow-Up      Are you regularly taking any medication or supplement to lower your cholesterol?   Yes- lipitor    Are you having muscle aches or other side effects that you think could be caused by your cholesterol lowering medication?  No    Hypertension Follow-up      Do you check your blood pressure regularly outside of the clinic? No     Are you following a low salt diet? Yes    Are your blood pressures ever more than 140 on the top number (systolic) OR more   than 90  "on the bottom number (diastolic), for example 140/90? No    How many servings of fruits and vegetables do you eat daily?  0-1    On average, how many sweetened beverages do you drink each day (Examples: soda, juice, sweet tea, etc.  Do NOT count diet or artificially sweetened beverages)?   0    How many days per week do you exercise enough to make your heart beat faster? 3 or less    How many minutes a day do you exercise enough to make your heart beat faster? 9 or less    How many days per week do you miss taking your medication? 0      Review of Systems   Constitutional, HEENT, cardiovascular, pulmonary, gi and gu systems are negative, except as otherwise noted.      Objective    /82   Pulse 97   Temp 97.1  F (36.2  C)   Resp 18   Ht 1.778 m (5' 10\")   Wt (!) 155 kg (341 lb 11.2 oz)   SpO2 97%   BMI 49.03 kg/m    Body mass index is 49.03 kg/m .  Physical Exam   GENERAL: alert and obese  PSYCH: mentation appears normal, affect normal/bright          "

## 2021-08-19 NOTE — PATIENT INSTRUCTIONS
Patient Education     Choosing a Bariatric Surgery Procedure  Bariatric surgery is a type of surgery to help you lose weight when diet and exercise alone has not been successful. It's a choice for some people who are obese and have health problems, such as diabetes, heart disease, arthritis, and sleep apnea. Diabetes and some other health problems may get better with weight loss.  Comparing surgery with medical treatment  People who have bariatric surgery tend to lose much more weight than people who get medical treatment for their weight loss. This means that surgery is more likely to help with health conditions linked to obesity. These may include diabetes, heart disease, arthritis, or sleep apnea. But the results vary. Some people can have large weight loss with medical treatment alone. And some people don t lose as much weight as they want after surgery.  Types of bariatric surgery  Surgeons do bariatric surgery using a number of different methods. The type of bariatric surgery that works best for you will depend on several factors. These include your general health, your medical needs, and your own preference. The types of surgery include:    Lap banding. This surgery is also known as laparoscopic adjustable gastric banding or LAGB. During lap banding your surgeon places an adjustable band around the top of your stomach. Your surgeon also places a small device called a port under the skin of your abdomen. A thin tube leads from the band to the port. Fluid is injected into the port and flows to the band to make it squeeze tighter around the top of the stomach. Or fluid can be removed through the port to loosen the band. The band around your stomach reduces the amount of food that you can eat at one time.    Gastric bypass. This is also called a Benoit-en-Y gastric bypass. This surgery also reduces the amount of food you can eat at one time. And it reduces the number of calories and nutrients you can absorb from  the foods you eat. During gastric bypass, your surgeon separates part of the stomach to create a small pouch. The pouch is then attached to a part of your small intestine. This small pouch holds less food, making you feel full faster. As food bypasses the rest of the stomach and upper part of your small intestine, you absorb fewer calories and nutrients.    Sleeve gastrectomy. This is a type of surgery that removes up to 85% of the stomach. It s also known as a gastric sleeve. The surgery turns the stomach into a narrow tube that looks like a sleeve. The sleeve holds much less food, and you feel full faster. Your stomach also makes less of the main hormone that causes hunger.    Biliopancreatic diversion with duodenal switch (BPD/DS).  This is a less common type of weight-loss surgery. In this procedure, your surgeon removes part of the stomach to create a gastric sleeve, as with the sleeve gastrectomy. The sleeve is then attached to a part of the lower small intestine. The sleeve holds much less food, and your body absorbs far fewer calories and nutrients from food.  Advantages and disadvantages of each type of surgery  Type of surgery Advantages Disadvantages   Lap banding       Lap banding is a simpler surgery    After lap band surgery, it is fairly easy to loosen or tighten the band    A tighter band might help you feel castillo sooner and help you lose weight    Your risk for serious complications right after your surgery is low    It can lead to loss of about half of a person s excess body weight after 2 years    Can be reversed       Slower and less early weight loss than other surgeries    You may be more likely to need a follow-up surgery    Not right for you if you think you ll have a hard time following a nutritional program    You may need to see your healthcare provider more often after this surgery    You may need to have band removed or replaced because of leaking or other problems    Band may slip out  of position    May cause nausea, vomiting, acid reflux, and trouble swallowing   Gastric bypass       Tends to be a very successful surgery    Can lead to loss of about two-thirds of a person s excess body weight after 2 years       Increased risk for complications    Can't be reversed    You are more likely to have nutritional problems with vitamin B-12, folate, calcium, and iron. This may require you to take vitamin/mineral supplements for the rest of your life.    Can cause dumping syndrome. This is diarrhea or nausea caused by food emptying too quickly from the stomach into the small intestine.   Sleeve gastrectomy       Less complex than a gastric bypass    Fewer complications than gastric bypass    Better at controlling hunger than lap banding    Can lead to loss of about two-thirds of a person s excess weight       You may have trouble absorbing certain nutrients. This may require you to take vitamin/mineral supplements for the rest of your life.    You may develop narrowing (strictures) in your intestines    Increased risk for abdominal hernias    Can't be reversed    Chance of acid reflux   BPD/DS       Most helpful for a person who is extremely obese    A choice for people who haven t had much success with other weight-loss surgery    Leads to the highest amount of weight loss       Has a higher risk for complications than other weight-loss surgeries    Reduces the absorption of essential vitamins and minerals. This may require you to take vitamin/mineral supplements for the rest of your life.    High risk for deficiencies of calcium, iron, and fat soluble vitamin (A, D, E, and K)    High risk of developing protein-energy malnutrition   General risks of bariatric surgery  All surgery has risks. Your risks may vary according to your general health, your age, the type of surgery you choose, and the amount of weight you need to lose. Talk with your healthcare provider about the risks that most apply to you.  Risks of bariatric surgery include:    Bleeding    Infection    Blockage of your bowels (intestinal blockage)    Blood clots in your legs that may travel to your lungs or heart     Heart attack    Internal hernias    Need for follow-up surgery    Gallstones (a later complication)    Nutritional problems (a later complication)    Mental health problems after the procedure    Weight regain  The post-surgery diet  You will get instructions about how to adapt to your new diet after your surgery. You will likely be on liquid nutrition for a few weeks after surgery. Over time, you ll start to eat soft foods and then solid foods. If you eat too much or too fast, you will likely have stomach pain or vomiting. You ll learn how to know when your new stomach is full.  Your healthcare provider or nutritionist will give you more instructions about your diet. These may vary depending on the type of surgery you had. You ll need to learn good habits like choosing healthy foods and not skipping meals. Your healthcare provider or nutritionist may also need to screen you for low levels of certain nutrients. This is more of a problem with gastrectomy, gastric bypass, and BPD/DS surgery.  Managing your health after surgery  You may need to work with your healthcare providers ongoing to stay healthy. This depends on what type of surgery you have. Your medical team will keep track of your health, especially as you lose weight quickly in the first 6 months or so after your surgery. Weight loss tends to be at its peak around a year after surgery.  Talk with your healthcare provider about your goals  Work with your healthcare provider to see which surgery may work for you. It s important to have sensible goals about what bariatric surgery might achieve for you. Some people may still be somewhat overweight a year or two after their surgery. Even if you don t lose all of your excess weight, health issues such as high blood pressure should get  better. You may be able to reduce the amount of medicines that you need to take.  Talk with your healthcare provider. Ask questions and express your concerns. Together you can decide the right treatment for your needs.     Bettie last reviewed this educational content on 11/1/2019 2000-2021 [employer]. All rights reserved. This information is not intended as a substitute for professional medical care. Always follow your healthcare professional's instructions.           Check out Amara miguel to help track meals and activity.

## 2022-04-16 ENCOUNTER — HOSPITAL ENCOUNTER (EMERGENCY)
Facility: CLINIC | Age: 52
Discharge: HOME | End: 2022-04-16
Attending: EMERGENCY MEDICINE | Admitting: EMERGENCY MEDICINE
Payer: COMMERCIAL

## 2022-04-16 ENCOUNTER — APPOINTMENT (OUTPATIENT)
Dept: GENERAL RADIOLOGY | Facility: CLINIC | Age: 52
End: 2022-04-16
Attending: EMERGENCY MEDICINE
Payer: COMMERCIAL

## 2022-04-16 VITALS
TEMPERATURE: 97.5 F | HEIGHT: 70 IN | RESPIRATION RATE: 18 BRPM | BODY MASS INDEX: 45.1 KG/M2 | DIASTOLIC BLOOD PRESSURE: 114 MMHG | HEART RATE: 97 BPM | WEIGHT: 315 LBS | OXYGEN SATURATION: 97 % | SYSTOLIC BLOOD PRESSURE: 159 MMHG

## 2022-04-16 DIAGNOSIS — M72.2 PLANTAR FASCIITIS: ICD-10-CM

## 2022-04-16 PROCEDURE — 73650 X-RAY EXAM OF HEEL: CPT | Mod: LT

## 2022-04-16 PROCEDURE — 99283 EMERGENCY DEPT VISIT LOW MDM: CPT

## 2022-04-16 ASSESSMENT — ENCOUNTER SYMPTOMS
BACK PAIN: 0
JOINT SWELLING: 0
FEVER: 0
COLOR CHANGE: 0
MYALGIAS: 1
ARTHRALGIAS: 0
WOUND: 0

## 2022-04-16 NOTE — ED TRIAGE NOTES
Pt presents to ED via triage ambulatory for left heel pain. Pt reports no known injury or wound to the area, started on Thursday and has gotten worse over past few days, no change in pain with ibuprofen, CMS intact

## 2022-04-16 NOTE — ED NOTES
Alert and Oriented to person, place, time and situation.    Airway patent.    Respirations are regular and unlabored.  Patient talking in full sentences.  Patient denies cough or shortness of breath.    Pulses are strong and regular with palpation.  Skin is normal color, warm and dry.   Cap refill is less than 3 seconds.  Patient denies chest pain/pressure.    Patient reports gradual onset left heel pain since Wednesday last week. No known injury. Pain has gotten progressively worse. Patient has BLE edema. CMS intact. No redess/warmth or drainage appreciated.

## 2022-04-16 NOTE — ED PROVIDER NOTES
"  History   Chief Complaint:  Foot Pain       HPI   Rakesh Carey is a 52 year old male who presents with foot pain. The patient reported that for the last 4 days he has been experiencing pain in the heel/arch of his left foot. When he first gets on his feet the throbbing pain is okay but the pain exacerbates the more time he is on his feet. His left foot does appear swollen to him. For pain management at home he has been taking ibuprofen though it has not been helpful. He denied any fever or knee pain.      Review of Systems   Constitutional: Negative for fever.   Musculoskeletal: Positive for myalgias. Negative for arthralgias, back pain, gait problem and joint swelling.   Skin: Negative for color change, rash and wound.   All other systems reviewed and are negative.      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  Alert and cooperative with exam. Normal mentation  HEENT: NC/AT. Conjunctiva without injection or scleral icterus. External ears normal.  Respiratory: Breathing comfortably on room air  CV: Normal rate, all extremities well perfused  GI:  Non-distended abdomen  Skin: Warm, dry, no rashes/open wounds on exposed skin  Musculoskeletal: LLE: CMS intact. Mild swelling to plantar fascia without overlying skin changes. no warmth. no pain with passive ROM. Tenderness of plantar " surface of his heel.   Neuro: Alert, answers questions appropriately. No gross motor deficits    Emergency Department Course     Imaging:  XR Calcaneus Left G/E 2 Views   Final Result   IMPRESSION: Plantar calcaneal spurring. Left hindfoot otherwise negative.        Report per radiology    Laboratory:  Labs Ordered and Resulted from Time of ED Arrival to Time of ED Departure - No data to display         Emergency Department Course:  Reviewed:  I reviewed nursing notes, vitals and past medical history    Assessments:  1510 I obtained history and examined the patient as noted above.   1552 I rechecked the patient and explained findings.     Disposition:  The patient was discharged to home.     Impression & Plan     Medical Decision Making:  Rakesh Carey is a 52 year old male presented for evaluation of left heel pain. He has been having heel pain for the last 4 days. His exam is consistent with plantar fasciitis with calcaneal spurring noted on imaging. There is no evidence for infection. He has no indication for foreign body.he has normal distal sensation and circulation. Patient is advised on exercises and stretches, recommended NSAIDs for pain control as well as Tylenol as needed for additional pain relief.  Additional supportive care discussed. He will follow up with PCP in one week's time if no improvement, immediately worsening. Advised for immediate return to the UR/ER if he develops increasing pain, numbness, weakness, tingling, or other concerns.     Diagnosis:    ICD-10-CM    1. Plantar fasciitis  M72.2          Scribe Disclosure:  Kiana MAC, am serving as a scribe at 3:07 PM on 4/16/2022 to document services personally performed by Aries Liao DO based on my observations and the provider's statements to me.         Aries Liao DO  04/16/22 2032

## 2022-05-28 ENCOUNTER — NURSE TRIAGE (OUTPATIENT)
Dept: NURSING | Facility: CLINIC | Age: 52
End: 2022-05-28
Payer: COMMERCIAL

## 2022-05-28 DIAGNOSIS — I10 ESSENTIAL HYPERTENSION WITH GOAL BLOOD PRESSURE LESS THAN 140/90: ICD-10-CM

## 2022-05-28 RX ORDER — METOPROLOL SUCCINATE 100 MG/1
100 TABLET, EXTENDED RELEASE ORAL DAILY
Qty: 15 TABLET | Refills: 0 | Status: SHIPPED | OUTPATIENT
Start: 2022-05-28 | End: 2022-07-08

## 2022-05-28 NOTE — TELEPHONE ENCOUNTER
"190/110, wife calling. Out of medications. He's gained 60 lbs since MD saw him. Couldn't donate plasma. He's not doing good since last year. He coughs all the time, he weighs 400 lbs. He's short of breath. She said he drinks. He has a bone spur on his foot. He can't pay the ER visit. He isn't home yet from trying to donate plasma. Lost his job. He refuses to go anywhere. He's scheduled for a physical in June, to see Dr Rowe. He needs lisinopril. He takes metoprolol every day. He needs another one to add to it. She said he would double up on it. Takes 50 mg 24 hour tablet. They have no insurance. They have to live on 450 dollars a week. She has social security because she's retired.  Appointment June 13th at 3:40 p.m. Wife wants a call back. Can they double up on the metoprolol every day while he waits for the appointment?        Dr Carmina Austin on call for [facility]. I connected with the page  at:  10:04 a.m. MD pulled up chart to review.  MD states he should have a blood pressue check next week with MA by Wednesday. . Increase to 100 mg metoprolol a day, take two 50 mg tablets. If he has one at home he should check b/p and pulse. Pulse should be above 55 he should call with that or if lightheaded or dizzy. She will send two weeks dose of metroprolol to [facility] in [facility].     I called and told this to the patient and his wife. They do have a blood pressure machine at home and will do checks. They will check his pulse and will set up an appointment for a blood pressure check at the clinic by Wednesday. I connected with scheduling so they can set that up.    Magaly Dias RN  [facility]        Reason for Disposition    [1] Request for URGENT new prescription or refill of \"essential\" medication (i.e., likelihood of harm to patient if not taken) AND [2] triager unable to fill per unit policy    Additional Information    Negative: Drug overdose and triager unable to " answer question    Negative: Caller requesting information unrelated to medicine    Negative: Caller requesting a prescription for Strep throat and has a positive culture result    Negative: Rash while taking a medication or within 3 days of stopping it    Negative: Immunization reaction suspected    Negative: [1] Asthma and [2] having symptoms of asthma (cough, wheezing, etc.)    Negative: [1] Influenza symptoms AND [2] anti-viral med prescription request, such as Tamiflu    Negative: [1] Symptom of illness (e.g., headache, abdominal pain, earache, vomiting) AND [2] more than mild    Negative: MORE THAN A DOUBLE DOSE of a prescription or over-the-counter (OTC) drug    Negative: [1] DOUBLE DOSE (an extra dose or lesser amount) of over-the-counter (OTC) drug AND [2] any symptoms (e.g., dizziness, nausea, pain, sleepiness)    Negative: [1] DOUBLE DOSE (an extra dose or lesser amount) of prescription drug AND [2] any symptoms (e.g., dizziness, nausea, pain, sleepiness)    Negative: Took another person's prescription drug    Negative: [1] DOUBLE DOSE (an extra dose or lesser amount) of prescription drug AND [2] NO symptoms (Exception: a double dose of antibiotics)    Negative: Diabetes drug error or overdose (e.g., took wrong type of insulin or took extra dose)    Protocols used: MEDICATION QUESTION CALL-A-

## 2022-06-01 ENCOUNTER — ALLIED HEALTH/NURSE VISIT (OUTPATIENT)
Dept: NURSING | Facility: CLINIC | Age: 52
End: 2022-06-01
Payer: COMMERCIAL

## 2022-06-01 VITALS
RESPIRATION RATE: 20 BRPM | SYSTOLIC BLOOD PRESSURE: 146 MMHG | DIASTOLIC BLOOD PRESSURE: 96 MMHG | OXYGEN SATURATION: 95 % | WEIGHT: 315 LBS | HEART RATE: 90 BPM | BODY MASS INDEX: 51.86 KG/M2

## 2022-06-01 DIAGNOSIS — I10 HYPERTENSION GOAL BP (BLOOD PRESSURE) < 140/90: Primary | ICD-10-CM

## 2022-06-01 PROCEDURE — 99207 PR NO CHARGE NURSE ONLY: CPT

## 2022-06-01 RX ORDER — HYDROCHLOROTHIAZIDE 12.5 MG/1
12.5 TABLET ORAL DAILY
Qty: 90 TABLET | Refills: 1 | Status: SHIPPED | OUTPATIENT
Start: 2022-06-01 | End: 2022-07-12

## 2022-06-01 NOTE — PROGRESS NOTES
Rakesh Carey is being followed for Blood Pressure management.    Pt presented for BP check, noted home readings to be in 180's/110's. Denies symptoms. Pt was hypertensive today. Reports he has been taking double his metoprolol and has taken his wife's hydrochlorothiazide recently. Pt noted has not take hydrochlorothiazide today.    BP Readings from Last 3 Encounters:   06/01/22 (!) 146/96   04/16/22 (!) 159/114   08/19/21 130/82       Wt Readings from Last 2 Encounters:   06/01/22 (!) 163.9 kg (361 lb 6.4 oz)   04/16/22 (!) 154.7 kg (341 lb)       Is pulse 55 or greater? - Yes    Pulse Readings from Last 3 Encounters:   06/01/22 90   04/16/22 97   08/19/21 97       Current blood pressure medication(s):  Current Outpatient Medications   Medication Sig Dispense Refill     acyclovir (ZOVIRAX) 400 MG tablet Take 1 tablet (400 mg) by mouth 3 times daily 30 tablet 5     atorvastatin (LIPITOR) 20 MG tablet Take 1 tablet (20 mg) by mouth daily 90 tablet 3     HYDROcodone-acetaminophen (NORCO) 5-325 MG tablet Take 1 tablet by mouth every 6 hours as needed for severe pain 10 tablet 0     metoprolol succinate ER (TOPROL XL) 100 MG 24 hr tablet Take 1 tablet (100 mg) by mouth daily 15 tablet 0     omeprazole (PRILOSEC) 20 MG DR capsule TAKE 1 CAPSULE (20 MG) BY MOUTH 2 TIMES DAILY 180 capsule 3          1. Follow up instructions include:     Next Provider visit: Follow up in 1 month or less.      SUBJECTIVE:                                                    The patient is taking medication as prescribed and is tolerating well.   Patient is monitoring Blood Pressure at home.   Last 3 home readings     180's/110's    Out of the following complicating factors: Cough, Headache, Lightheadedness, Shortness of breath, Fatigue, Nausea, Sexual Dysfunction, New onset of swelling or edema, Weakness and New onset of Chest Pain, the patient reports:  None and Shortness of breath on exertion    OBJECTIVE:                                                       Today's BP completed using cuff size: X-large on left side  arm.      Potassium   Date Value Ref Range Status   06/10/2021 3.9 3.4 - 5.3 mmol/L Final     Creatinine   Date Value Ref Range Status   06/10/2021 0.86 0.66 - 1.25 mg/dL Final     Urea Nitrogen   Date Value Ref Range Status   06/10/2021 14 7 - 30 mg/dL Final     GFR Estimate   Date Value Ref Range Status   06/10/2021 >90 >60 mL/min/[1.73_m2] Final     Comment:     Non  GFR Calc  Starting 12/18/2018, serum creatinine based estimated GFR (eGFR) will be   calculated using the Chronic Kidney Disease Epidemiology Collaboration   (CKD-EPI) equation.           Education:  general discussion/verbal explanation  Ways to help improve BP/HTN:   Medications  Lose weight  Diet low in fat and rich in fruits, vegetables and low fat dairy products  Reduce salt in diet  Do something active for at least 30 minutes a day on most days of the week  Cut down on alcohol (if you drink more than 2 drinks per day)  Decrease stress (exercise, read, yoga, meditation, time for self, etc.)   Patient was given an opportunity to ask questions.    Patient verbalized understanding of this plan and is agreeable.    Aidan Bustillo RN

## 2022-06-02 NOTE — PROGRESS NOTES
Pt called back, advised of Rx sent and follow-up. Patient stated an understanding and agreed with plan.  Future Appointments   Date Time Provider Department Center   6/13/2022  3:40 PM Carrington Rowe, DO [facility]     Aidan UREÑA RN   [facility]

## 2022-06-02 NOTE — PROGRESS NOTES
Attempt # 1  Called # [phone]     Left a non detailed VM to call back at [phone] and ask for any available Triage Nurse.    Aidan Bustillo RN   [facility]

## 2022-06-02 NOTE — PROGRESS NOTES
Recommend adding hydrochlorothiazide 12.5 mg daily. We can follow up at his appointment in 2 weeks and recheck BP to titrate dose as needed.    Carrington Rowe DO  6/1/2022 10:46 PM

## 2022-06-10 DIAGNOSIS — I10 ESSENTIAL HYPERTENSION WITH GOAL BLOOD PRESSURE LESS THAN 140/90: ICD-10-CM

## 2022-06-13 ENCOUNTER — OFFICE VISIT (OUTPATIENT)
Dept: FAMILY MEDICINE | Facility: CLINIC | Age: 52
End: 2022-06-13
Payer: COMMERCIAL

## 2022-06-13 VITALS
BODY MASS INDEX: 45.1 KG/M2 | SYSTOLIC BLOOD PRESSURE: 128 MMHG | TEMPERATURE: 97.5 F | WEIGHT: 315 LBS | HEIGHT: 70 IN | HEART RATE: 104 BPM | OXYGEN SATURATION: 100 % | DIASTOLIC BLOOD PRESSURE: 80 MMHG

## 2022-06-13 DIAGNOSIS — Z12.5 SCREENING FOR PROSTATE CANCER: ICD-10-CM

## 2022-06-13 DIAGNOSIS — R45.89 DEPRESSED MOOD: ICD-10-CM

## 2022-06-13 DIAGNOSIS — Z13.220 SCREENING FOR HYPERLIPIDEMIA: ICD-10-CM

## 2022-06-13 DIAGNOSIS — R05.9 COUGH: ICD-10-CM

## 2022-06-13 DIAGNOSIS — Z13.1 SCREENING FOR DIABETES MELLITUS: ICD-10-CM

## 2022-06-13 DIAGNOSIS — E55.9 VITAMIN D DEFICIENCY: ICD-10-CM

## 2022-06-13 DIAGNOSIS — M54.16 LUMBAR BACK PAIN WITH RADICULOPATHY AFFECTING RIGHT LOWER EXTREMITY: ICD-10-CM

## 2022-06-13 DIAGNOSIS — Z00.00 ROUTINE GENERAL MEDICAL EXAMINATION AT A HEALTH CARE FACILITY: Primary | ICD-10-CM

## 2022-06-13 LAB — HBA1C MFR BLD: 5.9 % (ref 0–5.6)

## 2022-06-13 PROCEDURE — G0103 PSA SCREENING: HCPCS | Performed by: FAMILY MEDICINE

## 2022-06-13 PROCEDURE — 99396 PREV VISIT EST AGE 40-64: CPT | Performed by: FAMILY MEDICINE

## 2022-06-13 PROCEDURE — 80053 COMPREHEN METABOLIC PANEL: CPT | Performed by: FAMILY MEDICINE

## 2022-06-13 PROCEDURE — 36415 COLL VENOUS BLD VENIPUNCTURE: CPT | Performed by: FAMILY MEDICINE

## 2022-06-13 PROCEDURE — 82306 VITAMIN D 25 HYDROXY: CPT | Performed by: FAMILY MEDICINE

## 2022-06-13 PROCEDURE — 80061 LIPID PANEL: CPT | Performed by: FAMILY MEDICINE

## 2022-06-13 PROCEDURE — 83036 HEMOGLOBIN GLYCOSYLATED A1C: CPT | Performed by: FAMILY MEDICINE

## 2022-06-13 PROCEDURE — 99214 OFFICE O/P EST MOD 30 MIN: CPT | Mod: 25 | Performed by: FAMILY MEDICINE

## 2022-06-13 RX ORDER — ESCITALOPRAM OXALATE 10 MG/1
10 TABLET ORAL DAILY
Qty: 30 TABLET | Refills: 0 | Status: SHIPPED | OUTPATIENT
Start: 2022-06-13 | End: 2022-07-08

## 2022-06-13 RX ORDER — SEMAGLUTIDE 0.5 MG/.5ML
INJECTION, SOLUTION SUBCUTANEOUS
Qty: 6 ML | Refills: 1 | Status: SHIPPED | OUTPATIENT
Start: 2022-06-13 | End: 2022-07-12

## 2022-06-13 RX ORDER — IBUPROFEN 800 MG/1
800 TABLET, FILM COATED ORAL EVERY 8 HOURS PRN
Qty: 90 TABLET | Refills: 0 | Status: SHIPPED | OUTPATIENT
Start: 2022-06-13 | End: 2022-07-14

## 2022-06-13 ASSESSMENT — ENCOUNTER SYMPTOMS
NAUSEA: 0
DIARRHEA: 0
ABDOMINAL PAIN: 0
EYE PAIN: 0
HEMATOCHEZIA: 0
HEARTBURN: 0
FEVER: 0
MYALGIAS: 0
SHORTNESS OF BREATH: 1
ARTHRALGIAS: 0
FREQUENCY: 0
NERVOUS/ANXIOUS: 0
JOINT SWELLING: 0
PALPITATIONS: 0
HEMATURIA: 0
COUGH: 1
CONSTIPATION: 0
DIZZINESS: 0
DYSURIA: 0
SORE THROAT: 0
HEADACHES: 0
CHILLS: 0
WEAKNESS: 0
PARESTHESIAS: 0

## 2022-06-13 NOTE — PATIENT INSTRUCTIONS
Preventive Health Recommendations  Male Ages 50 - 64    Yearly exam:             See your health care provider every year in order to  o   Review health changes.   o   Discuss preventive care.    o   Review your medicines if your doctor has prescribed any.   Have a cholesterol test every 5 years, or more frequently if you are at risk for high cholesterol/heart disease.   Have a diabetes test (fasting glucose) every three years. If you are at risk for diabetes, you should have this test more often.   Have a colonoscopy at age 50, or have a yearly FIT test (stool test). These exams will check for colon cancer.    Talk with your health care provider about whether or not a prostate cancer screening test (PSA) is right for you.  You should be tested each year for STDs (sexually transmitted diseases), if you re at risk.     Shots: Get a flu shot each year. Get a tetanus shot every 10 years.     Nutrition:  Eat at least 5 servings of fruits and vegetables daily.   Eat whole-grain bread, whole-wheat pasta and brown rice instead of white grains and rice.   Get adequate Calcium and Vitamin D.     Lifestyle  Exercise for at least 150 minutes a week (30 minutes a day, 5 days a week). This will help you control your weight and prevent disease.   Limit alcohol to one drink per day.   No smoking.   Wear sunscreen to prevent skin cancer.   See your dentist every six months for an exam and cleaning.   See your eye doctor every 1 to 2 years.  [facility] should be contacting you to schedule your MRI.  If they do not contact you, you can call [phone] to make an appointment.      Start over the counter Flonase (fluticasone) 50 mcg/act -- 2 sprays in each nostril daily.

## 2022-06-13 NOTE — LETTER
June 22, 2022      Rakesh Carey  [address]  [facility] [address]        Dear ,    We are writing to inform you of your test results.    Here are the results from your recent lab tests:     -PSA (prostate specific antigen) test is normal.  This indicates a low likelihood of prostate cancer.  ADVISE: rechecking this in 1 year.   -HDL(good) cholesterol level is low and your triglycerides are elevated which can increase your heart disease risk.  A diet high in fat and simple carbohydrates, genetics and being overweight can contribute to this. LDL(bad) cholesterol level is normal.  ADVISE:exercising 150 minutes of aerobic exercise per week (30 minutes 5 days per week or 50 minutes 3 days per week are options), and omega-3 fatty acids (fish oil) 5417-6475 mg daily are helpful to improve this.  In 12 months, you should recheck your fasting cholesterol panel by scheduling a lab-only appointment.   -Liver and gallbladder tests are normal (ALT,AST, Alk phos, bilirubin), kidney function is normal (Cr, GFR), sodium is normal, potassium is normal, calcium is normal, glucose is normal.   -A1C (diabetes test) is in the prediabetes range.   -Vitamin D level is low-normal and getting 3781-5160 IU daily in your diet or supplements is recommended.     Please let me know if you have any further questions.     Thank you very much for choosing [facility].       Resulted Orders   Comprehensive metabolic panel (BMP + Alb, Alk Phos, ALT, AST, Total. Bili, TP)   Result Value Ref Range    Sodium 141 133 - 144 mmol/L    Potassium 4.3 3.4 - 5.3 mmol/L    Chloride 105 94 - 109 mmol/L    Carbon Dioxide (CO2) 29 20 - 32 mmol/L    Anion Gap 7 3 - 14 mmol/L    Urea Nitrogen 16 7 - 30 mg/dL    Creatinine 0.82 0.66 - 1.25 mg/dL    Calcium 9.0 8.5 - 10.1 mg/dL    Glucose 97 70 - 99 mg/dL    Alkaline Phosphatase 55 40 - 150 U/L    AST 32 0 - 45 U/L    ALT 39 0 - 70 U/L    Protein Total 7.6 6.8 - 8.8  g/dL    Albumin 3.6 3.4 - 5.0 g/dL    Bilirubin Total 0.4 0.2 - 1.3 mg/dL    GFR Estimate >90 >60 mL/min/1.73m2      Comment:      Effective December 21, 2021 eGFRcr in adults is calculated using the 2021 CKD-EPI creatinine equation which includes age and gender (Kierra mares al., NEJ, DOI: 10.1056/FRICnr0837693)   Lipid panel reflex to direct LDL Fasting   Result Value Ref Range    Cholesterol 174 <200 mg/dL    Triglycerides 208 (H) <150 mg/dL    Direct Measure HDL 37 (L) >=40 mg/dL    LDL Cholesterol Calculated 95 <=100 mg/dL    Non HDL Cholesterol 137 (H) <130 mg/dL    Patient Fasting > 8hrs? Yes     Narrative    Cholesterol  Desirable:  <200 mg/dL    Triglycerides  Normal:  Less than 150 mg/dL  Borderline High:  150-199 mg/dL  High:  200-499 mg/dL  Very High:  Greater than or equal to 500 mg/dL    Direct Measure HDL  Female:  Greater than or equal to 50 mg/dL   Male:  Greater than or equal to 40 mg/dL    LDL Cholesterol  Desirable:  <100mg/dL  Above Desirable:  100-129 mg/dL   Borderline High:  130-159 mg/dL   High:  160-189 mg/dL   Very High:  >= 190 mg/dL    Non HDL Cholesterol  Desirable:  130 mg/dL  Above Desirable:  130-159 mg/dL  Borderline High:  160-189 mg/dL  High:  190-219 mg/dL  Very High:  Greater than or equal to 220 mg/dL   Hemoglobin A1c   Result Value Ref Range    Hemoglobin A1C 5.9 (H) 0.0 - 5.6 %      Comment:      Normal <5.7%   Prediabetes 5.7-6.4%    Diabetes 6.5% or higher     Note: Adopted from ADA consensus guidelines.   Vitamin D Deficiency   Result Value Ref Range    Vitamin D, Total (25-Hydroxy) 22 20 - 75 ug/L    Narrative    Season, race, dietary intake, and treatment affect the concentration of 25-hydroxy-Vitamin D. Values may decrease during winter months and increase during summer months. Values 20-29 ug/L may indicate Vitamin D insufficiency and values <20 ug/L may indicate Vitamin D deficiency.    Vitamin D determination is routinely performed by an immunoassay specific for 25  hydroxyvitamin D3.  If an individual is on vitamin D2(ergocalciferol) supplementation, please specify 25 OH vitamin D2 and D3 level determination by LCMSMS test VITD23.     PSA, screen   Result Value Ref Range    Prostate Specific Antigen Screen 0.68 0.00 - 4.00 ug/L       If you have any questions or concerns, please call the clinic at the number listed above.       Sincerely,      Carrington Rowe, DO
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
## 2022-06-14 LAB
ALBUMIN SERPL-MCNC: 3.6 G/DL (ref 3.4–5)
ALP SERPL-CCNC: 55 U/L (ref 40–150)
ALT SERPL W P-5'-P-CCNC: 39 U/L (ref 0–70)
ANION GAP SERPL CALCULATED.3IONS-SCNC: 7 MMOL/L (ref 3–14)
AST SERPL W P-5'-P-CCNC: 32 U/L (ref 0–45)
BILIRUB SERPL-MCNC: 0.4 MG/DL (ref 0.2–1.3)
BUN SERPL-MCNC: 16 MG/DL (ref 7–30)
CALCIUM SERPL-MCNC: 9 MG/DL (ref 8.5–10.1)
CHLORIDE BLD-SCNC: 105 MMOL/L (ref 94–109)
CHOLEST SERPL-MCNC: 174 MG/DL
CO2 SERPL-SCNC: 29 MMOL/L (ref 20–32)
CREAT SERPL-MCNC: 0.82 MG/DL (ref 0.66–1.25)
DEPRECATED CALCIDIOL+CALCIFEROL SERPL-MC: 22 UG/L (ref 20–75)
FASTING STATUS PATIENT QL REPORTED: YES
GFR SERPL CREATININE-BSD FRML MDRD: >90 ML/MIN/1.73M2
GLUCOSE BLD-MCNC: 97 MG/DL (ref 70–99)
HDLC SERPL-MCNC: 37 MG/DL
LDLC SERPL CALC-MCNC: 95 MG/DL
NONHDLC SERPL-MCNC: 137 MG/DL
POTASSIUM BLD-SCNC: 4.3 MMOL/L (ref 3.4–5.3)
PROT SERPL-MCNC: 7.6 G/DL (ref 6.8–8.8)
PSA SERPL-MCNC: 0.68 UG/L (ref 0–4)
SODIUM SERPL-SCNC: 141 MMOL/L (ref 133–144)
TRIGL SERPL-MCNC: 208 MG/DL

## 2022-06-14 RX ORDER — METOPROLOL SUCCINATE 100 MG/1
TABLET, EXTENDED RELEASE ORAL
Qty: 90 TABLET | Refills: 1 | OUTPATIENT
Start: 2022-06-14

## 2022-06-14 NOTE — TELEPHONE ENCOUNTER
Message sent to pharmacy - Refusal reason: Patient never under provider care (PLEASE ROUTE REQUEST TO Carrington Rowe, AT "[employer].).  Claire PADRON

## 2022-07-06 DIAGNOSIS — R45.89 DEPRESSED MOOD: ICD-10-CM

## 2022-07-06 NOTE — TELEPHONE ENCOUNTER
Pt was told to follow up for mood in 1 month ( 713/2022)     Please help schedule     Thank you     Carmina Quezada RN, BSN  [facility]

## 2022-07-07 DIAGNOSIS — I10 ESSENTIAL HYPERTENSION WITH GOAL BLOOD PRESSURE LESS THAN 140/90: ICD-10-CM

## 2022-07-07 NOTE — TELEPHONE ENCOUNTER
Spoke with patients spouse and advised to call us back.  When patient calls back please schedule appointment for 1 month mood check.    Evelina Palacios, CMA

## 2022-07-08 RX ORDER — ESCITALOPRAM OXALATE 10 MG/1
10 TABLET ORAL DAILY
Qty: 30 TABLET | Refills: 0 | Status: SHIPPED | OUTPATIENT
Start: 2022-07-08 | End: 2022-07-12

## 2022-07-08 RX ORDER — METOPROLOL SUCCINATE 100 MG/1
TABLET, EXTENDED RELEASE ORAL
Qty: 90 TABLET | Refills: 3 | Status: SHIPPED | OUTPATIENT
Start: 2022-07-08 | End: 2023-04-08

## 2022-07-11 DIAGNOSIS — M54.16 LUMBAR BACK PAIN WITH RADICULOPATHY AFFECTING RIGHT LOWER EXTREMITY: ICD-10-CM

## 2022-07-12 ENCOUNTER — VIRTUAL VISIT (OUTPATIENT)
Dept: FAMILY MEDICINE | Facility: CLINIC | Age: 52
End: 2022-07-12
Payer: COMMERCIAL

## 2022-07-12 DIAGNOSIS — E78.5 HYPERLIPIDEMIA WITH TARGET LDL LESS THAN 130: Primary | ICD-10-CM

## 2022-07-12 DIAGNOSIS — R45.89 DEPRESSED MOOD: ICD-10-CM

## 2022-07-12 DIAGNOSIS — I10 HYPERTENSION GOAL BP (BLOOD PRESSURE) < 140/90: ICD-10-CM

## 2022-07-12 DIAGNOSIS — K21.00 GASTROESOPHAGEAL REFLUX DISEASE WITH ESOPHAGITIS WITHOUT HEMORRHAGE: ICD-10-CM

## 2022-07-12 PROCEDURE — 96127 BRIEF EMOTIONAL/BEHAV ASSMT: CPT | Mod: TEL | Performed by: FAMILY MEDICINE

## 2022-07-12 PROCEDURE — 99214 OFFICE O/P EST MOD 30 MIN: CPT | Mod: TEL | Performed by: FAMILY MEDICINE

## 2022-07-12 RX ORDER — HYDROCHLOROTHIAZIDE 25 MG/1
25 TABLET ORAL DAILY
Qty: 90 TABLET | Refills: 1 | Status: SHIPPED | OUTPATIENT
Start: 2022-07-12 | End: 2022-11-28

## 2022-07-12 RX ORDER — ATORVASTATIN CALCIUM 20 MG/1
20 TABLET, FILM COATED ORAL DAILY
Qty: 90 TABLET | Refills: 3 | Status: SHIPPED | OUTPATIENT
Start: 2022-07-12 | End: 2023-04-08

## 2022-07-12 RX ORDER — ESCITALOPRAM OXALATE 20 MG/1
20 TABLET ORAL DAILY
Qty: 30 TABLET | Refills: 0 | Status: SHIPPED | OUTPATIENT
Start: 2022-07-12 | End: 2022-08-08

## 2022-07-12 ASSESSMENT — ANXIETY QUESTIONNAIRES
GAD7 TOTAL SCORE: 2
6. BECOMING EASILY ANNOYED OR IRRITABLE: SEVERAL DAYS
2. NOT BEING ABLE TO STOP OR CONTROL WORRYING: NOT AT ALL
GAD7 TOTAL SCORE: 2
1. FEELING NERVOUS, ANXIOUS, OR ON EDGE: SEVERAL DAYS
IF YOU CHECKED OFF ANY PROBLEMS ON THIS QUESTIONNAIRE, HOW DIFFICULT HAVE THESE PROBLEMS MADE IT FOR YOU TO DO YOUR WORK, TAKE CARE OF THINGS AT HOME, OR GET ALONG WITH OTHER PEOPLE: NOT DIFFICULT AT ALL
7. FEELING AFRAID AS IF SOMETHING AWFUL MIGHT HAPPEN: NOT AT ALL
5. BEING SO RESTLESS THAT IT IS HARD TO SIT STILL: NOT AT ALL
3. WORRYING TOO MUCH ABOUT DIFFERENT THINGS: NOT AT ALL

## 2022-07-12 ASSESSMENT — PATIENT HEALTH QUESTIONNAIRE - PHQ9
SUM OF ALL RESPONSES TO PHQ QUESTIONS 1-9: 2
5. POOR APPETITE OR OVEREATING: NOT AT ALL

## 2022-07-12 NOTE — PROGRESS NOTES
Rakesh is a 52 year old who is being evaluated via a billable telephone visit.      What phone number would you like to be contacted at? [phone]  How would you like to obtain your AVS? MyChart    Assessment & Plan       Hyperlipidemia with target LDL less than 130: stable; no side effects. Continue Lipitor.  - atorvastatin (LIPITOR) 20 MG tablet; Take 1 tablet (20 mg) by mouth daily    Gastroesophageal reflux disease with esophagitis without hemorrhage: stable. Continue Prilosec  - omeprazole (PRILOSEC) 20 MG DR capsule; TAKE 1 CAPSULE (20 MG) BY MOUTH 2 TIMES DAILY    Depressed mood: increase Lexapro to 20 mg daily. Follow up in 1 month to recheck mood.   - escitalopram (LEXAPRO) 20 MG tablet; Take 1 tablet (20 mg) by mouth daily    Hypertension goal BP (blood pressure) < 140/90: continue monitoring at home. Asymptomatic. Continue hydrochlorothiazide 25 mg daily, metoprolol 100 mg daily.  - hydrochlorothiazide (HYDRODIURIL) 25 MG tablet; Take 1 tablet (25 mg) by mouth daily    No follow-ups on file.    Carrington Rowe, [facility]   Rakesh is a 52 year old, presenting for the following health issues:  Recheck Medication and Refill Request    HPI     Hyperlipidemia Follow-Up      Are you regularly taking any medication or supplement to lower your cholesterol?   Yes- atorvastatin (LIPITOR) 20 MG tablet    Are you having muscle aches or other side effects that you think could be caused by your cholesterol lowering medication?  No    Hypertension Follow-up      Do you check your blood pressure regularly outside of the clinic? Yes , donates plasma    Are you following a low salt diet? Yes    Are your blood pressures ever more than 140 on the top number (systolic) OR more   than 90 on the bottom number (diastolic), for example 140/90? Yes, sometime the systolic number is higher than 90, (100).     BP Readings from Last 2 Encounters:   06/13/22 128/80   06/01/22 (!) 146/96      Hemoglobin A1C POCT (%)   Date Value   06/10/2021 5.6   2011 5.6     Hemoglobin A1C (%)   Date Value   2022 5.9 (H)     LDL Cholesterol Calculated (mg/dL)   Date Value   2022 95   06/10/2021 98   2020 162 (H)     Depression and Anxiety Follow-Up    How are you doing with your depression since your last visit? No change    How are you doing with your anxiety since your last visit?  Worsened    Are you having other symptoms that might be associated with depression or anxiety? Yes:  Increased symptoms    Have you had a significant life event? No     Do you have any concerns with your use of alcohol or other drugs? No    Social History     Tobacco Use     Smoking status: Former Smoker     Packs/day: 0.25     Years: 20.00     Pack years: 5.00     Types: Cigarettes     Quit date: 2013     Years since quittin.2     Smokeless tobacco: Never Used     Tobacco comment: <1/2 ppd   Substance Use Topics     Alcohol use: Yes     Drug use: No     PHQ 2019   PHQ-9 Total Score 1 2 2   Q9: Thoughts of better off dead/self-harm past 2 weeks Not at all Not at all Not at all     THOMAS-7 SCORE 2019   Total Score 2 4 2     Last PHQ-9 2022   1.  Little interest or pleasure in doing things 0   2.  Feeling down, depressed, or hopeless 0   3.  Trouble falling or staying asleep, or sleeping too much 1   4.  Feeling tired or having little energy 1   5.  Poor appetite or overeating 0   6.  Feeling bad about yourself 0   7.  Trouble concentrating 0   8.  Moving slowly or restless 0   Q9: Thoughts of better off dead/self-harm past 2 weeks 0   PHQ-9 Total Score 2   Difficulty at work, home, or with people Not difficult at all     THOMAS-7  2022   1. Feeling nervous, anxious, or on edge 1   2. Not being able to stop or control worrying 0   3. Worrying too much about different things 0   4. Trouble relaxing 0   5. Being so restless that it is hard to sit  still 0   6. Becoming easily annoyed or irritable 1   7. Feeling afraid, as if something awful might happen 0   THOMAS-7 Total Score 2   If you checked any problems, how difficult have they made it for you to do your work, take care of things at home, or get along with other people? Not difficult at all       Suicide Assessment Five-step Evaluation and Treatment (SAFE-T)      Review of Systems   Constitutional, HEENT, cardiovascular, pulmonary, gi and gu systems are negative, except as otherwise noted.      Objective       Vitals:  No vitals were obtained today due to virtual visit.    Physical Exam   healthy, alert and no distress  PSYCH: Alert and oriented times 3; coherent speech, normal   rate and volume, able to articulate logical thoughts, able   to abstract reason, no tangential thoughts, no hallucinations   or delusions  His affect is normal  RESP: No cough, no audible wheezing, able to talk in full sentences  Remainder of exam unable to be completed due to telephone visits          Phone call duration: 7 minutes    .  ..

## 2022-07-14 RX ORDER — IBUPROFEN 800 MG/1
TABLET, FILM COATED ORAL
Qty: 90 TABLET | Refills: 0 | Status: SHIPPED | OUTPATIENT
Start: 2022-07-14 | End: 2022-08-16

## 2022-07-14 NOTE — TELEPHONE ENCOUNTER
Routing refill request to provider for review/approval because:  Labs not current:  CBC     Lab Results   Component Value Date    WBC 6.8 06/28/2018     Lab Results   Component Value Date    RBC 4.38 06/28/2018     Lab Results   Component Value Date    HGB 12.7 06/28/2018     Lab Results   Component Value Date    HCT 37.6 06/28/2018     No components found for: MCT  Lab Results   Component Value Date    MCV 86 06/28/2018     Lab Results   Component Value Date    MCH 29.0 06/28/2018     Lab Results   Component Value Date    MCHC 33.8 06/28/2018     Lab Results   Component Value Date    RDW 14.4 06/28/2018     Lab Results   Component Value Date     06/28/2018

## 2022-07-21 ENCOUNTER — HOSPITAL ENCOUNTER (OUTPATIENT)
Dept: MRI IMAGING | Facility: CLINIC | Age: 52
Discharge: HOME | End: 2022-07-21
Attending: FAMILY MEDICINE | Admitting: FAMILY MEDICINE
Payer: COMMERCIAL

## 2022-07-21 DIAGNOSIS — M54.16 LUMBAR BACK PAIN WITH RADICULOPATHY AFFECTING RIGHT LOWER EXTREMITY: ICD-10-CM

## 2022-07-21 PROCEDURE — 72148 MRI LUMBAR SPINE W/O DYE: CPT

## 2022-07-22 DIAGNOSIS — M54.16 LUMBAR BACK PAIN WITH RADICULOPATHY AFFECTING RIGHT LOWER EXTREMITY: Primary | ICD-10-CM

## 2022-07-28 ENCOUNTER — TELEPHONE (OUTPATIENT)
Dept: FAMILY MEDICINE | Facility: CLINIC | Age: 52
End: 2022-07-28

## 2022-07-28 NOTE — TELEPHONE ENCOUNTER
Patient calling about MRI results   7/21/22     Patient was attempted to be contact x2 (se result note) letter was sent regarding results 7/27/22      referral did not have spine clinic phone # on it, writer called to find out which location for [facility] he should call [phone]    Attempt #1   called patient back and left a voicemail with # to call to set up spine appointment [phone]-    Telma LOPEZ RN   [facility] Triage

## 2022-07-29 ENCOUNTER — OFFICE VISIT (OUTPATIENT)
Dept: URGENT CARE | Facility: URGENT CARE | Age: 52
End: 2022-07-29
Payer: COMMERCIAL

## 2022-07-29 VITALS
OXYGEN SATURATION: 96 % | WEIGHT: 315 LBS | RESPIRATION RATE: 18 BRPM | DIASTOLIC BLOOD PRESSURE: 111 MMHG | TEMPERATURE: 98.1 F | HEART RATE: 91 BPM | SYSTOLIC BLOOD PRESSURE: 161 MMHG | BODY MASS INDEX: 52.52 KG/M2

## 2022-07-29 DIAGNOSIS — R07.0 THROAT PAIN: ICD-10-CM

## 2022-07-29 DIAGNOSIS — K12.2 UVULITIS: Primary | ICD-10-CM

## 2022-07-29 LAB
DEPRECATED S PYO AG THROAT QL EIA: NEGATIVE
GROUP A STREP BY PCR: NOT DETECTED

## 2022-07-29 PROCEDURE — 99213 OFFICE O/P EST LOW 20 MIN: CPT | Performed by: FAMILY MEDICINE

## 2022-07-29 PROCEDURE — 87651 STREP A DNA AMP PROBE: CPT | Performed by: FAMILY MEDICINE

## 2022-07-29 RX ORDER — CEFDINIR 300 MG/1
300 CAPSULE ORAL 2 TIMES DAILY
Qty: 20 CAPSULE | Refills: 0 | Status: SHIPPED | OUTPATIENT
Start: 2022-07-29 | End: 2022-08-08
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
## 2022-07-29 NOTE — LETTER
[facility]  [address] [id number] [phone]      July 29, 2022    RE:  Rakesh CURRY Dockenneth                                                                                                                                                       [address]  [facility] [address]            To whom it may concern:    Rakesh Carey is under my professional care for Medical.    He may return to work with the following: No working or lifting restrictions on or about Saturday.          Sincerely,        Sebastián Starr DO    [facility]

## 2022-08-05 DIAGNOSIS — R45.89 DEPRESSED MOOD: ICD-10-CM

## 2022-08-05 NOTE — TELEPHONE ENCOUNTER
Future Appointments   Date Time Provider Department Center   8/23/2022 10:00 AM Fletcher Solares MD [facility]     Aidan UREÑA RN   [facility]

## 2022-08-08 ENCOUNTER — TELEPHONE (OUTPATIENT)
Dept: FAMILY MEDICINE | Facility: CLINIC | Age: 52
End: 2022-08-08

## 2022-08-08 NOTE — TELEPHONE ENCOUNTER
Patient needs his escitalopram refilled but his insurance will only cover it if it is a 90 day supply.  [facility].

## 2022-08-08 NOTE — TELEPHONE ENCOUNTER
Patient needs his escitalopram refilled but his insurance will only cover it if it is a 90 day supply.  [facility].

## 2022-08-09 RX ORDER — ESCITALOPRAM OXALATE 20 MG/1
TABLET ORAL
Qty: 90 TABLET | Refills: 1 | Status: SHIPPED | OUTPATIENT
Start: 2022-08-09 | End: 2023-06-19

## 2022-08-09 NOTE — TELEPHONE ENCOUNTER
Pending Prescriptions:                       Disp   Refills    escitalopram (LEXAPRO) 20 MG tablet [Pharm*90 tab*0        Sig: TAKE 1 TABLET BY MOUTH EVERY DAY    Routing refill request to provider for review/approval because:  Medication is reported/historical    Patient's insurance requires a 90 day supply

## 2022-08-12 DIAGNOSIS — M54.16 LUMBAR BACK PAIN WITH RADICULOPATHY AFFECTING RIGHT LOWER EXTREMITY: ICD-10-CM

## 2022-08-16 RX ORDER — IBUPROFEN 800 MG/1
TABLET, FILM COATED ORAL
Qty: 90 TABLET | Refills: 0 | Status: SHIPPED | OUTPATIENT
Start: 2022-08-16 | End: 2022-09-16

## 2022-08-16 NOTE — TELEPHONE ENCOUNTER
Routing refill request to provider for review/approval because:   NSAID Medications Failed 08/12/2022 02:07 AM   Protocol Details  Blood pressure under 140/90 in past 12 months    Normal CBC on file in past 12 months        Please advise     Thank you     Carmina Quezada RN, BSN  [facility]

## 2022-08-23 ENCOUNTER — OFFICE VISIT (OUTPATIENT)
Dept: NEUROSURGERY | Facility: CLINIC | Age: 52
End: 2022-08-23
Attending: FAMILY MEDICINE
Payer: COMMERCIAL

## 2022-08-23 DIAGNOSIS — M54.16 LUMBAR BACK PAIN WITH RADICULOPATHY AFFECTING RIGHT LOWER EXTREMITY: ICD-10-CM

## 2022-08-23 PROCEDURE — 99204 OFFICE O/P NEW MOD 45 MIN: CPT | Performed by: NEUROLOGICAL SURGERY

## 2022-08-23 ASSESSMENT — PAIN SCALES - GENERAL: PAINLEVEL: SEVERE PAIN (6)

## 2022-08-23 NOTE — PROGRESS NOTES
Mr. Carey is a 52-year-old man seen today for evaluation of chronic low back pain associated with intermittent bilateral lower extremity symptoms.  He reports that this problem has to some degree been present for decades but has gotten progressively worse over the past 6 months.  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  He reports that his current job does not involve repetitive heavy lifting.  He has no complaint of numbness or radicular pain into his lower extremities.  He complains of diffuse weakness when walking but nothing focal.  He has no complaints of change in bowel or bladder control.  He is noted to be morbidly obese with a BMI greater than 52 and a recent greater than 30 pound weight gain.  He localizes pain to the lumbosacral sacral junction in a bandlike pattern with some radiation into his bilateral buttocks and posterior proximal thighs.  He has no complaint of dermatomal sensory loss.  He has no history of instrumentation, surgery or other invasive procedure to his lumbar spine.  He has no recent falls or other specific incidents.    On examination, he has slight difficulty arising from a seated to a standing position with some pain behavior.  Gait is normal.  Motor examination demonstrates significant posterior thighs muscle tightness and reduced range of motion.  However plantar and dorsiflexion at the ankles or weightbearing appears to be 5/5 and symmetric.  Bilateral proximal bilateral lower extremity strength is intact and symmetric.  He is capable of 80 degrees of lumbar flexion and 10 degrees of lumbar extension.  He is able to mount and dismount examination table with minimal difficulty.  Straight leg raising is negative for radicular symptoms.  Deep tendon reflexes are trace but symmetric at the knees and ankles bilaterally.  There is no ankle clonus noted and toes are mute to plantar stimulation.  There is no evidence of dermatomal sensory disturbance  noted.    Review of his recent lumbar MRI scan shows slight loss of upper lumbar lordosis.  Spinal alignment is otherwise satisfactory.  There is no evidence of disc protrusion or significant canal or foraminal narrowing.  There are no vertebral body fractures.  There is loss of disc height at L2-3 and at L5-S1.  There are is some Modic change in the inferior endplate of L5 related to this disc degeneration.    Assessment: Exacerbation of chronic low back pain in this morbidly obese 52-year-old man who has not undergone any nonsurgical therapy directed at this problem.  I reviewed the clinical and radiographic findings with him and specifically reviewed the lumbar MRI scan in substantial detail.  I spoke with him regarding management alternatives for chronic lumbar pain.  I told him that presently there is no indication for surgical intervention and that because of his size any possibility of lumbar surgery would be complicated and substantially difficult with little chance of success.  I told him that I believe that significant weight loss is likely the most effective way to diminish his current mechanical low back pain.  I have suggested that he begin with physical therapy to focus on a home exercise program and flexibility.  I have also suggested that perhaps Dr. Durbin in physical medicine might have some ideas to offer regarding nonsurgical management of this problem.    More than 30 minutes was spent reviewing clinical and radiographic findings and counseling the patient regarding those findings as well as differential diagnosis, management alternatives, risks and benefits.

## 2022-08-23 NOTE — LETTER
8/23/2022         RE: Rakesh Carey  [address]  [place] [address]        Dear Colleague,    Thank you for referring your patient, Rakesh Carey, to the [facility]. Please see a copy of my visit note below.    Mr. Carey is a 52-year-old man seen today for evaluation of chronic low back pain associated with intermittent bilateral lower extremity symptoms.  He reports that this problem has to some degree been present for decades but has gotten progressively worse over the past 6 months.  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  He reports that his current job does not involve repetitive heavy lifting.  He has no complaint of numbness or radicular pain into his lower extremities.  He complains of diffuse weakness when walking but nothing focal.  He has no complaints of change in bowel or bladder control.  He is noted to be morbidly obese with a BMI greater than 52 and a recent greater than 30 pound weight gain.  He localizes pain to the lumbosacral sacral junction in a bandlike pattern with some radiation into his bilateral buttocks and posterior proximal thighs.  He has no complaint of dermatomal sensory loss.  He has no history of instrumentation, surgery or other invasive procedure to his lumbar spine.  He has no recent falls or other specific incidents.    On examination, he has slight difficulty arising from a seated to a standing position with some pain behavior.  Gait is normal.  Motor examination demonstrates significant posterior thighs muscle tightness and reduced range of motion.  However plantar and dorsiflexion at the ankles or weightbearing appears to be 5/5 and symmetric.  Bilateral proximal bilateral lower extremity strength is intact and symmetric.  He is capable of 80 degrees of lumbar flexion and 10 degrees of lumbar extension.  He is able to mount and dismount examination table with minimal difficulty.  Straight leg  raising is negative for radicular symptoms.  Deep tendon reflexes are trace but symmetric at the knees and ankles bilaterally.  There is no ankle clonus noted and toes are mute to plantar stimulation.  There is no evidence of dermatomal sensory disturbance noted.    Review of his recent lumbar MRI scan shows slight loss of upper lumbar lordosis.  Spinal alignment is otherwise satisfactory.  There is no evidence of disc protrusion or significant canal or foraminal narrowing.  There are no vertebral body fractures.  There is loss of disc height at L2-3 and at L5-S1.  There are is some Modic change in the inferior endplate of L5 related to this disc degeneration.    Assessment: Exacerbation of chronic low back pain in this morbidly obese 52-year-old man who has not undergone any nonsurgical therapy directed at this problem.  I reviewed the clinical and radiographic findings with him and specifically reviewed the lumbar MRI scan in substantial detail.  I spoke with him regarding management alternatives for chronic lumbar pain.  I told him that presently there is no indication for surgical intervention and that because of his size any possibility of lumbar surgery would be complicated and substantially difficult with little chance of success.  I told him that I believe that significant weight loss is likely the most effective way to diminish his current mechanical low back pain.  I have suggested that he begin with physical therapy to focus on a home exercise program and flexibility.  I have also suggested that perhaps Dr. Durbin in physical medicine might have some ideas to offer regarding nonsurgical management of this problem.    More than 30 minutes was spent reviewing clinical and radiographic findings and counseling the patient regarding those findings as well as differential diagnosis, management alternatives, risks and benefits.      Again, thank you for allowing me to participate in the care of your patient.         Sincerely,        Fletcher Solares MD

## 2022-08-23 NOTE — PATIENT INSTRUCTIONS
Patient Next Steps:    Order placed for PM & R  Call: [phone]    Order placed for physical therapy. You can call the phone number highlighted in the order to schedule your appointment. Please call our clinic if symptoms persist after your course of physical therapy.  If you have not heard from the scheduling office within 2 business days, please call [phone] for [employer], [phone] for [employer] and [phone] for [employer].                Please call us if you have any further questions or concerns.    [facility]   Phone: [phone]  Fax: [phone]

## 2022-08-23 NOTE — NURSING NOTE
"Rakesh Carey is a 52 year old male who presents for:  Chief Complaint   Patient presents with     Consult     Lumbar back pain with radiculopathy; pain radiating to both lower extremities        Initial Vitals:  There were no vitals taken for this visit. Estimated body mass index is 52.52 kg/m  as calculated from the following:    Height as of 6/13/22: 5' 10\" (1.778 m).    Weight as of 7/29/22: 366 lb (166 kg).. There is no height or weight on file to calculate BSA. BP completed using cuff size: large  Severe Pain (6)    Nursing Comments:     Hernán Raphael MA      "

## 2022-08-26 ENCOUNTER — VIRTUAL VISIT (OUTPATIENT)
Dept: FAMILY MEDICINE | Facility: CLINIC | Age: 52
End: 2022-08-26
Payer: COMMERCIAL

## 2022-08-26 DIAGNOSIS — M54.16 LUMBAR BACK PAIN WITH RADICULOPATHY AFFECTING RIGHT LOWER EXTREMITY: Primary | ICD-10-CM

## 2022-08-26 PROCEDURE — 99213 OFFICE O/P EST LOW 20 MIN: CPT | Mod: TEL | Performed by: FAMILY MEDICINE

## 2022-08-26 RX ORDER — HYDROCODONE BITARTRATE AND ACETAMINOPHEN 5; 325 MG/1; MG/1
1 TABLET ORAL 2 TIMES DAILY PRN
Qty: 18 TABLET | Refills: 0 | Status: SHIPPED | OUTPATIENT
Start: 2022-08-26 | End: 2022-09-29

## 2022-08-26 NOTE — PROGRESS NOTES
Rakesh is a 52 year old who is being evaluated via a billable telephone visit.      What phone number would you like to be contacted at? [phone]  How would you like to obtain your AVS? Tsering    Assessment & Plan     Lumbar back pain with radiculopathy affecting right lower extremity  Agree with recommendation to start physical therapy and follow up with PM&R. Okay to continue NSAIDs. Recommend ice/heat as well. Will rpecsribe short course of hydrocodone to help with severe pain -- use sparingly.                    No follow-ups on file.    Carrington Rowe DO  [facility] [place]    Subjective   Rakesh is a 52 year old, presenting for the following health issues:  Consult (Back Specialty / Options)    HPI       He saw spine clinic and advised that back is non-surgical. Recommended metting with PMR and physical therapy.   Today, [place] state [place] and now having worse pain. Pain radiating from low back and going down into his calves bilaterally. Taking ibuprofen 800 mg once or twice per day.           Review of Systems   Constitutional, HEENT, cardiovascular, pulmonary, gi and gu systems are negative, except as otherwise noted.      Objective       Vitals:  No vitals were obtained today due to virtual visit.    Physical Exam   healthy, alert and no distress  PSYCH: Alert and oriented times 3; coherent speech, normal   rate and volume, able to articulate logical thoughts, able   to abstract reason, no tangential thoughts, no hallucinations   or delusions  His affect is normal  RESP: No cough, no audible wheezing, able to talk in full sentences  Remainder of exam unable to be completed due to telephone visits            Phone call duration: 12 minutes    .  ..

## 2022-09-16 DIAGNOSIS — M54.16 LUMBAR BACK PAIN WITH RADICULOPATHY AFFECTING RIGHT LOWER EXTREMITY: ICD-10-CM

## 2022-09-16 RX ORDER — IBUPROFEN 800 MG/1
TABLET, FILM COATED ORAL
Qty: 90 TABLET | Refills: 0 | Status: SHIPPED | OUTPATIENT
Start: 2022-09-16 | End: 2022-10-19

## 2022-09-16 NOTE — TELEPHONE ENCOUNTER
Routing refill request to provider for review/approval because:  Labs out of range: & Labs not current:    BP Readings from Last 3 Encounters:   07/29/22 (!) 161/111   06/13/22 128/80   06/01/22 (!) 146/96     CBC RESULTS:   Recent Labs   Lab Test 06/28/18  1829   WBC 6.8   RBC 4.38*   HGB 12.7*   HCT 37.6*   MCV 86   MCH 29.0   MCHC 33.8   RDW 14.4        Maria Esther CARRION RN

## 2022-09-20 ENCOUNTER — THERAPY VISIT (OUTPATIENT)
Dept: PHYSICAL THERAPY | Facility: CLINIC | Age: 52
End: 2022-09-20
Attending: NEUROLOGICAL SURGERY
Payer: COMMERCIAL

## 2022-09-20 DIAGNOSIS — M54.16 LUMBAR BACK PAIN WITH RADICULOPATHY AFFECTING RIGHT LOWER EXTREMITY: ICD-10-CM

## 2022-09-20 DIAGNOSIS — M54.16 LUMBAR BACK PAIN WITH RADICULOPATHY AFFECTING LOWER EXTREMITY: ICD-10-CM

## 2022-09-20 PROCEDURE — 97530 THERAPEUTIC ACTIVITIES: CPT | Mod: GP | Performed by: PHYSICAL THERAPIST

## 2022-09-20 PROCEDURE — 97162 PT EVAL MOD COMPLEX 30 MIN: CPT | Mod: GP | Performed by: PHYSICAL THERAPIST

## 2022-09-20 PROCEDURE — 97110 THERAPEUTIC EXERCISES: CPT | Mod: 59 | Performed by: PHYSICAL THERAPIST

## 2022-09-20 NOTE — PROGRESS NOTES
"Physical Therapy Initial Evaluation  Subjective:  The history is provided by the patient. No  was used.   Therapist Generated HPI Evaluation  Problem details: Patient hronic low back pain associated with intermittent bilateral lower extremity symptoms, increase over the last 6 months.  He reports that this problem has to some degree been present for decades but has gotten progressively worse over the past 6 months.  Pain is constant central LB, intermittent bilateral buttocks and posterior thighs.  He denies symptoms below the knees.  Pain ranges 6-9/10, describes as \"sharp\".  Symptoms increase with standing >10', walking on unlevel surface, prolonged walking, bending forward, sitting >1 hour, sit-stand transition, going down>up stairs.  He denies pain with coughing and sneezing. Symptoms decrease with sitting in a reclined position, ibuprofen (4x/day, 800 mg tablets), short periods of walking, if standing and has pain can relieve pressure briefly with leaning forward on counter. .                     Pain is worse in the A.M. (very \"stiff\" in the a.m., takes 30+ minutes of light activity).  Since onset symptoms are gradually worsening.     Special tests included:  MRI (results in Epic).    Restrictions due to condition include:  Working in normal job without restrictions.  Barriers include:  None as reported by patient.    Patient Health History           General health as reported by patient is good.  Pertinent medical history includes: overweight and high blood pressure.   Red flags:  None as reported by patient.  Medical allergies: none.    Other surgery history details: right ankle fusion .    Current medications:  High blood pressure medication and anti-depressants. Other medications details: 800 mg ibuprofen.    Current occupation is .   Primary job tasks include:  Prolonged sitting, computer work and prolonged standing.                                "     Objective:  Standing Alignment:        Lumbar deviations alignment: difficult to assess due to obesity; no shift noted.            Gait:    Gait Type:  Normal   Assistive Devices:  None                 Lumbar/SI Evaluation  ROM:    AROM Lumbar:   Flexion:          Hands to knees-pain   Ext:                    WNL   Side Bend:        Left:     Right:   Rotation:           Left:     Right:   Side Glide:        Left:  Min loss    Right:  Min loss - pain LB          Lumbar Myotomes:  normal            Lumbar DTR's:  not assessed        Lumbar Dermtomes:  Lumbar dermatomes: decreased sensation to light touch right L4 and L5                 Neural Tension/Mobility:      Left side:Slump  negative.   Right side:   Slump positive.                                                       Symptoms prior to test movements:  Pain central LB   Correction of sitting posture with lumbar roll:  No effect; firm roll increase pain LB  Prone:  Increase LBP, unable to tolerate without pillow under head/chest  RODO with hands on hips:  Increase LBP/no worse  RODO with hips against counter:  Increase LB pain/pressure, worse  Prone over 2 pillows (under abdomen) - increase LBP, unable to tolerate  Hooklying: decrease pain        Assessment/Plan:    Patient is a 52 year old male with lumbar complaints.    Patient has the following significant findings with corresponding treatment plan.                Diagnosis 1:  LBP with right>left  radiculopathy    Pain -  hot/cold therapy, self management, education, directional preference exercise and home program  Decreased ROM/flexibility - therapeutic exercise and home program  Decreased function - therapeutic activities and home program  Impaired posture - neuro re-education and home program    Therapy Evaluation Codes:   1) History comprised of:   Personal factors that impact the plan of care:      Time since onset of symptoms.    Comorbidity factors that impact the plan of care are:       Overweight.     Medications impacting care: Anti-inflammatory.  2) Examination of Body Systems comprised of:   Body structures and functions that impact the plan of care:      Lumbar spine.   Activity limitations that impact the plan of care are:      Bending, Sitting, Stairs, Standing and Sleeping.  3) Clinical presentation characteristics are:   Evolving/Changing.  4) Decision-Making    Moderate complexity using standardized patient assessment instrument and/or measureable assessment of functional outcome.  Cumulative Therapy Evaluation is: Moderate complexity.    Previous and current functional limitations:  (See Goal Flow Sheet for this information)    Short term and Long term goals: (See Goal Flow Sheet for this information)     Communication ability:  Patient appears to be able to clearly communicate and understand verbal and written communication and follow directions correctly.  Treatment Explanation - The following has been discussed with the patient:   RX ordered/plan of care  Anticipated outcomes  Possible risks and side effects  This patient would benefit from PT intervention to resume normal activities.   Rehab potential is fair.    Frequency:  1 X week, once daily  Duration:  for 6 weeks  Discharge Plan:  Achieve all LTG.  Independent in home treatment program.  Reach maximal therapeutic benefit.    Please refer to the daily flowsheet for treatment today, total treatment time and time spent performing 1:1 timed codes.

## 2022-09-27 ENCOUNTER — THERAPY VISIT (OUTPATIENT)
Dept: PHYSICAL THERAPY | Facility: CLINIC | Age: 52
End: 2022-09-27
Payer: COMMERCIAL

## 2022-09-27 DIAGNOSIS — M54.16 LUMBAR BACK PAIN WITH RADICULOPATHY AFFECTING LOWER EXTREMITY: Primary | ICD-10-CM

## 2022-09-27 PROCEDURE — 97110 THERAPEUTIC EXERCISES: CPT | Mod: GP | Performed by: PHYSICAL THERAPIST

## 2022-09-27 PROCEDURE — 97530 THERAPEUTIC ACTIVITIES: CPT | Mod: GP | Performed by: PHYSICAL THERAPIST

## 2022-09-29 ENCOUNTER — VIRTUAL VISIT (OUTPATIENT)
Dept: FAMILY MEDICINE | Facility: CLINIC | Age: 52
End: 2022-09-29
Payer: COMMERCIAL

## 2022-09-29 DIAGNOSIS — M54.16 LUMBAR BACK PAIN WITH RADICULOPATHY AFFECTING RIGHT LOWER EXTREMITY: ICD-10-CM

## 2022-09-29 DIAGNOSIS — Z79.899 ENCOUNTER FOR LONG-TERM (CURRENT) USE OF MEDICATIONS: Primary | ICD-10-CM

## 2022-09-29 PROCEDURE — 99213 OFFICE O/P EST LOW 20 MIN: CPT | Mod: TEL | Performed by: FAMILY MEDICINE

## 2022-09-29 RX ORDER — HYDROCODONE BITARTRATE AND ACETAMINOPHEN 5; 325 MG/1; MG/1
1 TABLET ORAL 2 TIMES DAILY PRN
Qty: 30 TABLET | Refills: 0 | Status: SHIPPED | OUTPATIENT
Start: 2022-09-29 | End: 2023-06-19

## 2022-09-29 RX ORDER — HYDROCODONE BITARTRATE AND ACETAMINOPHEN 5; 325 MG/1; MG/1
1 TABLET ORAL 2 TIMES DAILY PRN
Qty: 20 TABLET | Refills: 0 | Status: SHIPPED | OUTPATIENT
Start: 2022-09-29 | End: 2022-09-29

## 2022-09-29 RX ORDER — GABAPENTIN 300 MG/1
300 CAPSULE ORAL 3 TIMES DAILY
Qty: 30 CAPSULE | Refills: 0 | Status: SHIPPED | OUTPATIENT
Start: 2022-09-29 | End: 2023-06-19

## 2022-09-29 NOTE — PROGRESS NOTES
Rakesh is a 52 year old who is being evaluated via a billable telephone visit.      What phone number would you like to be contacted at? [phone]  How would you like to obtain your AVS? Mail a copy    Assessment & Plan     Encounter for long-term (current) use of medications  - Pain Management  Referral; Future  - gabapentin (NEURONTIN) 300 MG capsule; Take 1 capsule (300 mg) by mouth 3 times daily  - HYDROcodone-acetaminophen (NORCO) 5-325 MG tablet; Take 1 tablet by mouth 2 times daily as needed for severe pain    Lumbar back pain with radiculopathy affecting right lower extremity  Discussed that I wouldn't recommend hydrocodone for long term management of chronic back pain. Will refill small supply to use sparingly. Continue acetaminophen/ibuprofen as first line. Will also start gabapentin. Will refer to pain clinic. Also encouraged continuing physical therapy as it will likely take more time ot continue treamtent. Follow up with PMR as well.  - Pain Management  Referral; Future  - gabapentin (NEURONTIN) 300 MG capsule; Take 1 capsule (300 mg) by mouth 3 times daily  - HYDROcodone-acetaminophen (NORCO) 5-325 MG tablet; Take 1 tablet by mouth 2 times daily as needed for severe pain      No follow-ups on file.    Carrington Rowe [facility]   Rakesh is a 52 year old presenting for the following health issues:  No chief complaint on file.      HPI       Patient is interested in referral to pain clinic to help with pain management. He has had a couple visits with physical therapy so far and not noticing a difference with pain.        Review of Systems   Constitutional, HEENT, cardiovascular, pulmonary, gi and gu systems are negative, except as otherwise noted.      Objective           Vitals:  No vitals were obtained today due to virtual visit.    Physical Exam   healthy, alert and no distress  PSYCH: Alert and oriented times 3; coherent speech, normal    rate and volume, able to articulate logical thoughts, able   to abstract reason, no tangential thoughts, no hallucinations   or delusions  His affect is normal  RESP: No cough, no audible wheezing, able to talk in full sentences  Remainder of exam unable to be completed due to telephone visits          Phone call duration: 11 minutes

## 2022-10-04 ENCOUNTER — THERAPY VISIT (OUTPATIENT)
Dept: PHYSICAL THERAPY | Facility: CLINIC | Age: 52
End: 2022-10-04
Payer: COMMERCIAL

## 2022-10-04 DIAGNOSIS — M54.16 LUMBAR BACK PAIN WITH RADICULOPATHY AFFECTING LOWER EXTREMITY: Primary | ICD-10-CM

## 2022-10-04 PROCEDURE — 97530 THERAPEUTIC ACTIVITIES: CPT | Mod: GP | Performed by: PHYSICAL THERAPIST

## 2022-10-04 PROCEDURE — 97110 THERAPEUTIC EXERCISES: CPT | Mod: GP | Performed by: PHYSICAL THERAPIST

## 2022-10-16 DIAGNOSIS — M54.16 LUMBAR BACK PAIN WITH RADICULOPATHY AFFECTING RIGHT LOWER EXTREMITY: ICD-10-CM

## 2022-10-17 ENCOUNTER — TRANSFERRED RECORDS (OUTPATIENT)
Dept: HEALTH INFORMATION MANAGEMENT | Facility: CLINIC | Age: 52
End: 2022-10-17

## 2022-10-19 RX ORDER — IBUPROFEN 800 MG/1
TABLET, FILM COATED ORAL
Qty: 90 TABLET | Refills: 0 | Status: SHIPPED | OUTPATIENT
Start: 2022-10-19 | End: 2023-06-19

## 2022-10-19 NOTE — TELEPHONE ENCOUNTER
Routing refill request to provider for review/approval because:  Ibuprofen does not pass the [facility] refill protocol     NSAID Medications Failed 10/16/2022 09:06 AM   Protocol Details  Blood pressure under 140/90 in past 12 months    Normal CBC on file in past 12 months     Last visit was 2 weeks ago     Telma LOPEZ RN   [facility] Triage

## 2022-11-14 ENCOUNTER — VIRTUAL VISIT (OUTPATIENT)
Dept: FAMILY MEDICINE | Facility: CLINIC | Age: 52
End: 2022-11-14
Payer: COMMERCIAL

## 2022-11-14 DIAGNOSIS — R05.9 COUGH, UNSPECIFIED TYPE: ICD-10-CM

## 2022-11-14 DIAGNOSIS — J20.9 ACUTE BRONCHITIS, UNSPECIFIED ORGANISM: Primary | ICD-10-CM

## 2022-11-14 PROCEDURE — 99213 OFFICE O/P EST LOW 20 MIN: CPT | Mod: 95 | Performed by: FAMILY MEDICINE

## 2022-11-14 RX ORDER — BENZONATATE 200 MG/1
200 CAPSULE ORAL 3 TIMES DAILY PRN
Qty: 30 CAPSULE | Refills: 0 | Status: SHIPPED | OUTPATIENT
Start: 2022-11-14 | End: 2023-06-19

## 2022-11-14 RX ORDER — ALBUTEROL SULFATE 90 UG/1
2 AEROSOL, METERED RESPIRATORY (INHALATION) EVERY 6 HOURS PRN
Qty: 18 G | Refills: 0 | Status: SHIPPED | OUTPATIENT
Start: 2022-11-14 | End: 2023-06-19

## 2022-11-14 RX ORDER — AZITHROMYCIN 250 MG/1
TABLET, FILM COATED ORAL
Qty: 6 TABLET | Refills: 0 | Status: SHIPPED | OUTPATIENT
Start: 2022-11-14 | End: 2022-11-19

## 2022-11-14 NOTE — PROGRESS NOTES
Rakesh is a 52 year old who is being evaluated via a billable telephone visit.      What phone number would you like to be contacted at? [phone]  How would you like to obtain your AVS? MyChart    Assessment & Plan     Acute bronchitis, unspecified organism  ,Recommended to start on azithromycin take Tessalon 3 times daily as needed for cough, use albuterol inhaler as needed for wheezing  Recommended rest, push fluids  Dosing and potential medication side effects discussed.  RTC in 1week if no better by then or sooner prn.    Patient verbalised understanding and is agreeable to the plan.    - azithromycin (ZITHROMAX) 250 MG tablet; Take 2 tablets (500 mg) by mouth daily for 1 day, THEN 1 tablet (250 mg) daily for 4 days.  - albuterol (PROAIR HFA/PROVENTIL HFA/VENTOLIN HFA) 108 (90 Base) MCG/ACT inhaler; Inhale 2 puffs into the lungs every 6 hours as needed for wheezing  - benzonatate (TESSALON) 200 MG capsule; Take 1 capsule (200 mg) by mouth 3 times daily as needed for cough    Cough, unspecified type  as above    - benzonatate (TESSALON) 200 MG capsule; Take 1 capsule (200 mg) by mouth 3 times daily as needed for cough             Chart documentation done in part with Dragon Voice recognition Software. Although reviewed after completion, some word and grammatical error may remain.    See Patient Instructions    Return in about 1 week (around 11/21/2022), or if symptoms worsen or fail to improve.    Julisa Madrid MD  [facility]   Rakesh is a 52 year old, presenting for the following health issues:  Cough    Patient is here for a telephone visit instead of in person visit due to the current COVID-19 pandemic.  Patient is new to the provider, is here today for telephone visit with concerns of having ongoing moderately productive cough for the past 3 to 4 weeks that started with URI symptoms at the beginning  Patient denies fever, chills, decrease or lack of appetite,  fatigue  He does have intermittent feeling of chest tightness and wheezing  Denies hemoptysis cough is worse at night when he is trying to lie down  Does not smoke. Has no h/o asthnma or allergies.  Denies sore throat, postnasal drip, sinus pain, dizziness, ear symptoms, abnormal skin rashes, diarrhea, nausea, vomiting, chest pain, palpitations  Has some headache from coughing  Patient denies recent history of travel  He is vaccinated and boosted once against COVID   denies recent history of sick contact exposure  Wife had similar symptoms that started a week ago, but she has been feeling better in the last few days  Past medical history significant for hypertension, GERD, chronic pain          HPI     Acute Illness  Acute illness concerns: cough   Onset/Duration: 1 month  Symptoms:  Fever: No  Chills/Sweats: No  Headache (location?): YES  Sinus Pressure: No  Conjunctivitis:  No  Ear Pain: no  Rhinorrhea: No  Congestion: No  Sore Throat: No  Cough: YES  Wheeze: No  Decreased Appetite: No  Nausea: No  Vomiting: No  Diarrhea: No  Dysuria/Freq.: No  Dysuria or Hematuria: No  Fatigue/Achiness: No  Sick/Strep Exposure: No  Therapies tried and outcome: Cough drops      Review of Systems   CONSTITUTIONAL: NEGATIVE for fever, chills, change in weight  INTEGUMENTARY/SKIN: NEGATIVE for worrisome rashes, moles or lesions  EYES: NEGATIVE for vision changes or irritation  ENT/MOUTH: NEGATIVE for ear, mouth and throat problems  RESP: Coughing  CV: NEGATIVE for chest pain, palpitations or peripheral edema  CV: Hx HTN  GI: NEGATIVE for nausea, abdominal pain, heartburn, or change in bowel habits and Hx GERD  MUSCULOSKELETAL: History of chronic pain  NEURO: NEGATIVE for weakness, dizziness or paresthesias  ENDOCRINE: NEGATIVE for temperature intolerance, skin/hair changes  HEME/ALLERGY/IMMUNE: NEGATIVE for bleeding problems  PSYCHIATRIC: NEGATIVE for changes in mood or affect      Objective    Vitals - Patient Reported  Weight  "(Patient Reported): 163.3 kg (360 lb)  Height (Patient Reported): 177.8 cm (5' 10\")  BMI (Based on Pt Reported Ht/Wt): 51.65  Pain Score: No Pain (0)      Vitals:  No vitals were obtained today due to virtual visit.    Physical Exam   healthy, alert , not in acute distress  PSYCH: Alert and oriented times 3; coherent speech, normal   rate and volume, able to articulate logical thoughts, able   to abstract reason, no tangential thoughts, no hallucinations   or delusions  His affect is normal  RESP: Coughing during conversation, no audible wheezing, able to talk in full sentences  Remainder of exam unable to be completed due to telephone visits                Phone call duration: 6 minutes    "

## 2022-11-25 DIAGNOSIS — I10 HYPERTENSION GOAL BP (BLOOD PRESSURE) < 140/90: ICD-10-CM

## 2022-11-28 RX ORDER — HYDROCHLOROTHIAZIDE 25 MG/1
TABLET ORAL
Qty: 90 TABLET | Refills: 1 | Status: SHIPPED | OUTPATIENT
Start: 2022-11-28 | End: 2023-03-29

## 2022-11-28 NOTE — TELEPHONE ENCOUNTER
Routing refill request to provider for review/approval because:  BP     BP Readings from Last 3 Encounters:   07/29/22 (!) 161/111   06/13/22 128/80   06/01/22 (!) 146/96      HANNAH TAFOYA RN on 11/28/2022 at 11:34 AM   [facility]

## 2023-01-05 NOTE — PROGRESS NOTES
"Subjective:  HPI  Physical Exam  Oswestry Score: 20 %                 Objective:  System    Physical Exam    General     ROS    Assessment/Plan:    DISCHARGE REPORT    Progress reporting period is from 9-20-22 to 10-4-22, 3 visits.       SUBJECTIVE  At third visit patient reported continued improvement.  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  Thigh sxs feel like \"muscle soreness\" vs typical pain.    Current Pain level: 3/10 (central LB).     Initial Pain level:  (6-9/10).   Changes in function:  Yes (See Goal flowsheet attached for changes in current functional level)  Adverse reaction to treatment or activity: None    OBJECTIVE  Good sitting posture without cuing.    Lumbar flexion and extension AROM WNL.    Progressed HEP, education re: progression to maintenance program.     ASSESSMENT/PLAN  Updated problem list and treatment plan: Diagnosis 1:  LBP with right radiculopathy    Pain -  self management and home program  STG/LTGs have been met or progress has been made towards goals:  Yes (See Goal flow sheet completed today.)  Assessment of Progress: The patient's condition is improving.  Self Management Plans:  Patient is independent in a home treatment program.    Recommendations:  Plan was for patient to continue with home program.  If he was not continuing to progress or had questions was to return for additional visits.  He did not return, will discharge PT chart at this time.     Please refer to the daily flowsheet for treatment today, total treatment time and time spent performing 1:1 timed codes.          "

## 2023-03-04 ENCOUNTER — TRANSFERRED RECORDS (OUTPATIENT)
Dept: HEALTH INFORMATION MANAGEMENT | Facility: CLINIC | Age: 53
End: 2023-03-04

## 2023-03-06 ENCOUNTER — ALLIED HEALTH/NURSE VISIT (OUTPATIENT)
Dept: NURSING | Facility: CLINIC | Age: 53
End: 2023-03-06
Payer: COMMERCIAL

## 2023-03-06 VITALS
DIASTOLIC BLOOD PRESSURE: 84 MMHG | BODY MASS INDEX: 48.35 KG/M2 | HEART RATE: 107 BPM | WEIGHT: 315 LBS | OXYGEN SATURATION: 100 % | SYSTOLIC BLOOD PRESSURE: 134 MMHG

## 2023-03-06 DIAGNOSIS — K21.00 GASTROESOPHAGEAL REFLUX DISEASE WITH ESOPHAGITIS WITHOUT HEMORRHAGE: ICD-10-CM

## 2023-03-06 DIAGNOSIS — I10 ESSENTIAL HYPERTENSION: Primary | ICD-10-CM

## 2023-03-06 PROCEDURE — 99207 PR NO CHARGE NURSE ONLY: CPT

## 2023-03-06 NOTE — TELEPHONE ENCOUNTER
Pt asking to get this a 40 mg cap as he takes both in the AM he has insurance and needs new script to a new pharmacy - [facility]     Please advise     Thank you     Carmina Quezada RN, BSN  [facility]

## 2023-03-06 NOTE — PROGRESS NOTES
Hypertension Follow-up      Do you check your blood pressure regularly outside of the clinic? Yes     Are you following a low salt diet? Yes    Are your blood pressures ever more than 140 on the top number (systolic) OR more   than 90 on the bottom number (diastolic), for example 140/90? Yes      How many servings of fruits and vegetables do you eat daily?  2-3    On average, how many sweetened beverages do you drink each day (Examples: soda, juice, sweet tea, etc.  Do NOT count diet or artificially sweetened beverages)?   0    How many days per week do you exercise enough to make your heart beat faster? 3 or less    How many minutes a day do you exercise enough to make your heart beat faster? 9 or less    How many days per week do you miss taking your medication? 0    BP Readings from Last 3 Encounters:   03/06/23 134/84   07/29/22 (!) 161/111   06/13/22 128/80          Creatinine   Date Value Ref Range Status   06/13/2022 0.82 0.66 - 1.25 mg/dL Final   06/10/2021 0.86 0.66 - 1.25 mg/dL Final     Sodium   Date Value Ref Range Status   06/13/2022 141 133 - 144 mmol/L Final   06/10/2021 138 133 - 144 mmol/L Final     Current Outpatient Medications   Medication     acyclovir (ZOVIRAX) 400 MG tablet     albuterol (PROAIR HFA/PROVENTIL HFA/VENTOLIN HFA) 108 (90 Base) MCG/ACT inhaler     atorvastatin (LIPITOR) 20 MG tablet     benzonatate (TESSALON) 200 MG capsule     escitalopram (LEXAPRO) 20 MG tablet     gabapentin (NEURONTIN) 300 MG capsule     hydrochlorothiazide (HYDRODIURIL) 25 MG tablet     HYDROcodone-acetaminophen (NORCO) 5-325 MG tablet     ibuprofen (ADVIL/MOTRIN) 800 MG tablet     metoprolol succinate ER (TOPROL XL) 100 MG 24 hr tablet     omeprazole (PRILOSEC) 20 MG DR capsule     No current facility-administered medications for this visit.     Denies: CP, SOB, headaches, blurred vision, N/V, numbness or tingling.     Carmina Quezada RN, BSN  [facility]

## 2023-03-08 ENCOUNTER — TELEPHONE (OUTPATIENT)
Dept: FAMILY MEDICINE | Facility: CLINIC | Age: 53
End: 2023-03-08
Payer: COMMERCIAL

## 2023-03-08 RX ORDER — OMEPRAZOLE 40 MG/1
40 CAPSULE, DELAYED RELEASE ORAL DAILY
Qty: 90 CAPSULE | Refills: 3 | Status: SHIPPED | OUTPATIENT
Start: 2023-03-08 | End: 2023-03-15

## 2023-03-08 NOTE — TELEPHONE ENCOUNTER
Reason for Call:  Form, our goal is to have forms completed with 72 hours, however, some forms may require a visit or additional information.    Type of letter, form or note:  medical    Who is the form from?: [facility] in [place]  (if other please explain)    Where did the form come from: form was faxed in    What clinic location was the form placed at?: [facility]    Where the form was placed: Dr. Rowe's Box/Folder    What number is listed as a contact on the form?: F [phone]         Call taken on 3/8/2023 at 11:11 AM by Cheyenne Sebastian

## 2023-03-08 NOTE — TELEPHONE ENCOUNTER
Prior Authorization Retail Medication Request    Medication/Dose:  Omeprazole 40MG Capsules  ICD code (if different than what is on RX):    Previously Tried and Failed:    Rationale:      Insurance Name:  In chart  Insurance ID:        Pharmacy Information (if different than what is on RX)  Name:  [facility]  Phone:  [phone]    Plan does not cover, please change RX or advise to start a KATELYNN Nboles

## 2023-03-10 NOTE — TELEPHONE ENCOUNTER
Routing to [facility] PA pool to please start the P process for Omeprazole 40 mg.  Shahida Chandra,

## 2023-03-12 NOTE — TELEPHONE ENCOUNTER
Central Prior Authorization Team   Phone: [phone]    PA Initiation    Medication: Dhingana message to prescriber regarding Omeprazole 40MG Capsules  Insurance Company:    Pharmacy Filling the Rx: [id number] - [place] - [address] AT [facility] [address]  Filling Pharmacy Phone: [phone]  Filling Pharmacy Fax: [phone]  Start Date: 3/12/2023

## 2023-03-13 NOTE — TELEPHONE ENCOUNTER
PA not needed.  Pharmacy was able to process 40mg X1 daily through insurance and will notify patient when ready.

## 2023-03-15 ENCOUNTER — TELEPHONE (OUTPATIENT)
Dept: FAMILY MEDICINE | Facility: CLINIC | Age: 53
End: 2023-03-15
Payer: COMMERCIAL

## 2023-03-15 DIAGNOSIS — K21.00 GASTROESOPHAGEAL REFLUX DISEASE WITH ESOPHAGITIS WITHOUT HEMORRHAGE: ICD-10-CM

## 2023-03-15 RX ORDER — OMEPRAZOLE 40 MG/1
40 CAPSULE, DELAYED RELEASE ORAL DAILY
Qty: 90 CAPSULE | Refills: 3 | Status: SHIPPED | OUTPATIENT
Start: 2023-03-15 | End: 2023-03-21

## 2023-03-15 NOTE — TELEPHONE ENCOUNTER
pls call and cancell RX at [facility]     Patient asking for 40mg vs the two 20mg per day.     Call spouse for any questions [phone] Chika

## 2023-03-15 NOTE — TELEPHONE ENCOUNTER
Medication sent to preferred pharmacy as already prescribed on 3/8  Called Christina to let her know-left message the rx has been sent to prefered pharmacy call [phone] if you have further questions.     Telma LOPEZ RN   [facility] Triage

## 2023-03-21 ENCOUNTER — TELEPHONE (OUTPATIENT)
Dept: FAMILY MEDICINE | Facility: CLINIC | Age: 53
End: 2023-03-21
Payer: COMMERCIAL

## 2023-03-21 ENCOUNTER — TRANSFERRED RECORDS (OUTPATIENT)
Dept: HEALTH INFORMATION MANAGEMENT | Facility: CLINIC | Age: 53
End: 2023-03-21

## 2023-03-21 DIAGNOSIS — K21.00 GASTROESOPHAGEAL REFLUX DISEASE WITH ESOPHAGITIS WITHOUT HEMORRHAGE: ICD-10-CM

## 2023-03-21 RX ORDER — OMEPRAZOLE 40 MG/1
40 CAPSULE, DELAYED RELEASE ORAL DAILY
Qty: 90 CAPSULE | Refills: 3 | Status: SHIPPED | OUTPATIENT
Start: 2023-03-21 | End: 2024-05-14

## 2023-03-21 NOTE — TELEPHONE ENCOUNTER
omeprazole (PRILOSEC) 40 MG DR capsule 90 capsule 3 3/15/2023  No   Sig - Route: Take 1 capsule (40 mg) by mouth daily TAKE 1 CAPSULE (20 MG) BY MOUTH 2 TIMES DAILY - Oral   Sent to pharmacy as: Omeprazole 40 MG Oral Capsule Delayed Release (priLOSEC)   Class: E-Prescribe       Praveena, pharmacist. Clarified that it should be 1 capsule daily. New order sent.     HANNAH TAFOYA RN on 3/21/2023 at 4:21 PM   [facility]

## 2023-03-28 DIAGNOSIS — I10 HYPERTENSION GOAL BP (BLOOD PRESSURE) < 140/90: ICD-10-CM

## 2023-03-28 NOTE — TELEPHONE ENCOUNTER
Reason for Call:  Medication or medication refill:    Do you use a [facility]?  Name of the pharmacy and phone number for the current request:  [facility] Address: [address], [place] [address]    Name of the medication requested: hydrochlorothiazide and his other blood pressure medication      Other request: refill request, requesting call back when refills are sent     Can we leave a detailed message on this number? YES    Phone number patient can be reached at: Home number on file [phone] (home)    Best Time: any    Call taken on 3/28/2023 at 10:29 AM by Dasia Hare

## 2023-03-29 RX ORDER — HYDROCHLOROTHIAZIDE 25 MG/1
25 TABLET ORAL DAILY
Qty: 90 TABLET | Refills: 0 | Status: SHIPPED | OUTPATIENT
Start: 2023-03-29 | End: 2023-06-19

## 2023-03-29 NOTE — TELEPHONE ENCOUNTER
TC- Please call patient to schedule an appointment   Due for an Office visit for further refills   Last yearly physical appointment was 6/13/22      Called to verify with pharmacy no refills left on hydrochlorothiazide   Has one refill of metoprolol.   metoprolol succinate ER (TOPROL XL) 100 MG 24 hr tablet 90 tablet 3 7/8/2022  No   Sig: TAKE 1 TABLET BY MOUTH EVERY DAY   Sent to pharmacy as: Metoprolol Succinate ER      90 day Refill approved of hydrochlorothiazide per [facility] refill protocol.     Thank you!  Telma LOPEZ RN   [facility] Triage

## 2023-04-08 ENCOUNTER — TELEPHONE (OUTPATIENT)
Dept: FAMILY MEDICINE | Facility: CLINIC | Age: 53
End: 2023-04-08

## 2023-04-08 ENCOUNTER — TELEPHONE (OUTPATIENT)
Dept: NURSING | Facility: CLINIC | Age: 53
End: 2023-04-08
Payer: COMMERCIAL

## 2023-04-08 DIAGNOSIS — I10 ESSENTIAL HYPERTENSION WITH GOAL BLOOD PRESSURE LESS THAN 140/90: ICD-10-CM

## 2023-04-08 DIAGNOSIS — E78.5 HYPERLIPIDEMIA WITH TARGET LDL LESS THAN 130: ICD-10-CM

## 2023-04-08 RX ORDER — ATORVASTATIN CALCIUM 20 MG/1
20 TABLET, FILM COATED ORAL DAILY
Qty: 90 TABLET | Refills: 0 | Status: SHIPPED | OUTPATIENT
Start: 2023-04-08 | End: 2023-06-19

## 2023-04-08 RX ORDER — METOPROLOL SUCCINATE 100 MG/1
100 TABLET, EXTENDED RELEASE ORAL DAILY
Qty: 90 TABLET | Refills: 0 | Status: SHIPPED | OUTPATIENT
Start: 2023-04-08 | End: 2023-06-19

## 2023-04-08 NOTE — TELEPHONE ENCOUNTER
Pt calling with frustration about not getting his RX's sent to correct pharmacy and he has to pay a lot to pick them up because they keep sending [facility] to [facility] which is wrong.    Correct Pharm[facility]:    [facility]    Pt states 'this problem has been going on for months with both himself and his wife trying to get his refills right.'    Now Pt is going to be completely out of metoprolol on 4/10/23 and he 'is upset'    Nayeli Sawyer RN, Nurse Advisor 2:12 PM 4/8/2023

## 2023-04-08 NOTE — TELEPHONE ENCOUNTER
Please disregard.  Refill enc created.    Nayeli Sawyer RN, Nurse Advisor 2:09 PM 4/8/2023

## 2023-04-08 NOTE — TELEPHONE ENCOUNTER
"Last Written Prescription Date:  7/8/22  Last Fill Quantity: 90,  # refills: 3   Last office visit provider:  8/26/22, MIMI, PCP   Next appt scheduled:  6/19/23 with PCP    Requested Prescriptions   Pending Prescriptions Disp Refills     metoprolol succinate ER (TOPROL XL) 100 MG 24 hr tablet 90 tablet 3     Sig: Take 1 tablet (100 mg) by mouth daily       Beta-Blockers Protocol Passed - 4/8/2023  2:13 PM        Passed - Blood pressure under 140/90 in past 12 months     BP Readings from Last 3 Encounters:   03/06/23 134/84   07/29/22 (!) 161/111   06/13/22 128/80                 Passed - Patient is age 6 or older        Passed - Recent (12 mo) or future (30 days) visit within the authorizing provider's specialty     Patient has had an office visit with the authorizing provider or a provider within the authorizing providers department within the previous 12 mos or has a future within next 30 days. See \"Patient Info\" tab in inbasket, or \"Choose Columns\" in Meds & Orders section of the refill encounter.              Passed - Medication is active on med list        Last Written Prescription Date:  7/8/22  Last Fill Quantity: 90,  # refills: 3   Last office visit provider:  8/26/22, MIMI, PCP   Next appt scheduled:  6/19/23 with PCP       atorvastatin (LIPITOR) 20 MG tablet 90 tablet 3     Sig: Take 1 tablet (20 mg) by mouth daily       Statins Protocol Passed - 4/8/2023  2:13 PM        Passed - LDL on file in past 12 months     Recent Labs   Lab Test 06/13/22  1639   LDL 95             Passed - No abnormal creatine kinase in past 12 months     No lab results found.             Passed - Recent (12 mo) or future (30 days) visit within the authorizing provider's specialty     Patient has had an office visit with the authorizing provider or a provider within the authorizing providers department within the previous 12 mos or has a future within next 30 days. See \"Patient Info\" tab in inbasket, or \"Choose Columns\" in Meds & " Orders section of the refill encounter.              Passed - Medication is active on med list        Passed - Patient is age 18 or older             Nayeli Sawyer RN 04/08/23 2:45 PM

## 2023-04-11 DIAGNOSIS — I10 ESSENTIAL HYPERTENSION WITH GOAL BLOOD PRESSURE LESS THAN 140/90: ICD-10-CM

## 2023-04-11 NOTE — TELEPHONE ENCOUNTER
Medication Question or Refill        What medication are you calling about (include dose and sig)?: metoprolol     Preferred Pharmacy:       [facility] - [address]  [address] [place] [id number]  Phone: [phone] Fax: [phone]      Controlled Substance Agreement on file:   CSA -- Patient Level:    CSA: None found at the patient level.       Who prescribed the medication?: Carrington Rowe    Do you need a refill? Yes    When did you use the medication last? Today was the last day of his pills- [facility]    Patient offered an appointment? No    Do you have any questions or concerns?  No      Okay to leave a detailed message?: Yes at Cell number on file:    Telephone Information:   Mobile [phone]

## 2023-04-12 RX ORDER — METOPROLOL SUCCINATE 100 MG/1
100 TABLET, EXTENDED RELEASE ORAL DAILY
Qty: 90 TABLET | Refills: 0 | Status: CANCELLED | OUTPATIENT
Start: 2023-04-12

## 2023-04-12 NOTE — TELEPHONE ENCOUNTER
Pt calling to check status.    Spoke with [employer] and his 90 day supply has been shipped out on 4/11 and should be at his house in the next couple days.    He can track through the [facility] miguel or by tracking with [facility]   Tracking # [id number]    [facility] to update patient    Delta LERMA RN, BSN

## 2023-04-13 ENCOUNTER — TRANSFERRED RECORDS (OUTPATIENT)
Dept: HEALTH INFORMATION MANAGEMENT | Facility: CLINIC | Age: 53
End: 2023-04-13
Payer: COMMERCIAL

## 2023-05-12 ENCOUNTER — TRANSFERRED RECORDS (OUTPATIENT)
Dept: HEALTH INFORMATION MANAGEMENT | Facility: CLINIC | Age: 53
End: 2023-05-12
Payer: COMMERCIAL

## 2023-06-19 ENCOUNTER — OFFICE VISIT (OUTPATIENT)
Dept: FAMILY MEDICINE | Facility: CLINIC | Age: 53
End: 2023-06-19
Payer: COMMERCIAL

## 2023-06-19 VITALS
SYSTOLIC BLOOD PRESSURE: 132 MMHG | HEIGHT: 70 IN | RESPIRATION RATE: 18 BRPM | OXYGEN SATURATION: 96 % | HEART RATE: 94 BPM | DIASTOLIC BLOOD PRESSURE: 78 MMHG | BODY MASS INDEX: 45.1 KG/M2 | WEIGHT: 315 LBS | TEMPERATURE: 98.9 F

## 2023-06-19 DIAGNOSIS — E78.5 HYPERLIPIDEMIA WITH TARGET LDL LESS THAN 130: ICD-10-CM

## 2023-06-19 DIAGNOSIS — Z86.0100 HISTORY OF COLONIC POLYPS: ICD-10-CM

## 2023-06-19 DIAGNOSIS — M54.16 LUMBAR BACK PAIN WITH RADICULOPATHY AFFECTING RIGHT LOWER EXTREMITY: ICD-10-CM

## 2023-06-19 DIAGNOSIS — Z13.1 SCREENING FOR DIABETES MELLITUS: ICD-10-CM

## 2023-06-19 DIAGNOSIS — Z00.00 ROUTINE GENERAL MEDICAL EXAMINATION AT A HEALTH CARE FACILITY: Primary | ICD-10-CM

## 2023-06-19 DIAGNOSIS — Z12.5 SCREENING FOR PROSTATE CANCER: ICD-10-CM

## 2023-06-19 DIAGNOSIS — Z79.899 ENCOUNTER FOR LONG-TERM (CURRENT) USE OF MEDICATIONS: ICD-10-CM

## 2023-06-19 DIAGNOSIS — I10 ESSENTIAL HYPERTENSION WITH GOAL BLOOD PRESSURE LESS THAN 140/90: ICD-10-CM

## 2023-06-19 DIAGNOSIS — B00.1 RECURRENT COLD SORES: ICD-10-CM

## 2023-06-19 LAB — HBA1C MFR BLD: 6 % (ref 0–5.6)

## 2023-06-19 PROCEDURE — 83036 HEMOGLOBIN GLYCOSYLATED A1C: CPT | Performed by: FAMILY MEDICINE

## 2023-06-19 PROCEDURE — 80053 COMPREHEN METABOLIC PANEL: CPT | Performed by: FAMILY MEDICINE

## 2023-06-19 PROCEDURE — 80061 LIPID PANEL: CPT | Performed by: FAMILY MEDICINE

## 2023-06-19 PROCEDURE — G0103 PSA SCREENING: HCPCS | Performed by: FAMILY MEDICINE

## 2023-06-19 PROCEDURE — 99214 OFFICE O/P EST MOD 30 MIN: CPT | Mod: 25 | Performed by: FAMILY MEDICINE

## 2023-06-19 PROCEDURE — 36415 COLL VENOUS BLD VENIPUNCTURE: CPT | Performed by: FAMILY MEDICINE

## 2023-06-19 PROCEDURE — 99396 PREV VISIT EST AGE 40-64: CPT | Performed by: FAMILY MEDICINE

## 2023-06-19 RX ORDER — IBUPROFEN 800 MG/1
800 TABLET, FILM COATED ORAL EVERY 8 HOURS PRN
Qty: 90 TABLET | Refills: 0 | Status: SHIPPED | OUTPATIENT
Start: 2023-06-19 | End: 2023-07-06

## 2023-06-19 RX ORDER — HYDROCODONE BITARTRATE AND ACETAMINOPHEN 5; 325 MG/1; MG/1
1 TABLET ORAL 2 TIMES DAILY PRN
Qty: 30 TABLET | Refills: 0 | Status: SHIPPED | OUTPATIENT
Start: 2023-06-19 | End: 2024-08-13

## 2023-06-19 RX ORDER — ATORVASTATIN CALCIUM 20 MG/1
20 TABLET, FILM COATED ORAL DAILY
Qty: 90 TABLET | Refills: 3 | Status: SHIPPED | OUTPATIENT
Start: 2023-06-19 | End: 2024-05-24

## 2023-06-19 RX ORDER — HYDROCHLOROTHIAZIDE 25 MG/1
25 TABLET ORAL DAILY
Qty: 90 TABLET | Refills: 3 | Status: SHIPPED | OUTPATIENT
Start: 2023-06-19 | End: 2023-06-27

## 2023-06-19 RX ORDER — GABAPENTIN 300 MG/1
300 CAPSULE ORAL 3 TIMES DAILY
Qty: 270 CAPSULE | Refills: 0 | Status: SHIPPED | OUTPATIENT
Start: 2023-06-19 | End: 2023-09-07

## 2023-06-19 RX ORDER — ACYCLOVIR 400 MG/1
400 TABLET ORAL 3 TIMES DAILY
Qty: 30 TABLET | Refills: 5 | Status: SHIPPED | OUTPATIENT
Start: 2023-06-19 | End: 2024-08-13

## 2023-06-19 RX ORDER — METOPROLOL SUCCINATE 100 MG/1
100 TABLET, EXTENDED RELEASE ORAL DAILY
Qty: 90 TABLET | Refills: 3 | Status: SHIPPED | OUTPATIENT
Start: 2023-06-19 | End: 2023-12-26 | Stop reason: ALTCHOICE

## 2023-06-19 ASSESSMENT — ENCOUNTER SYMPTOMS
HEADACHES: 0
HEMATOCHEZIA: 0
HEMATURIA: 0
FREQUENCY: 0
CHILLS: 0
PARESTHESIAS: 0
COUGH: 0
MYALGIAS: 0
ARTHRALGIAS: 1
NAUSEA: 0
ABDOMINAL PAIN: 0
NERVOUS/ANXIOUS: 0
WEAKNESS: 0
EYE PAIN: 0
PALPITATIONS: 0
DIZZINESS: 0
SHORTNESS OF BREATH: 0
SORE THROAT: 0
HEARTBURN: 0
FEVER: 0
CONSTIPATION: 0
JOINT SWELLING: 1
DYSURIA: 0
DIARRHEA: 0

## 2023-06-19 ASSESSMENT — ANXIETY QUESTIONNAIRES
IF YOU CHECKED OFF ANY PROBLEMS ON THIS QUESTIONNAIRE, HOW DIFFICULT HAVE THESE PROBLEMS MADE IT FOR YOU TO DO YOUR WORK, TAKE CARE OF THINGS AT HOME, OR GET ALONG WITH OTHER PEOPLE: NOT DIFFICULT AT ALL
6. BECOMING EASILY ANNOYED OR IRRITABLE: SEVERAL DAYS
GAD7 TOTAL SCORE: 1
GAD7 TOTAL SCORE: 1
5. BEING SO RESTLESS THAT IT IS HARD TO SIT STILL: NOT AT ALL
7. FEELING AFRAID AS IF SOMETHING AWFUL MIGHT HAPPEN: NOT AT ALL
1. FEELING NERVOUS, ANXIOUS, OR ON EDGE: NOT AT ALL
2. NOT BEING ABLE TO STOP OR CONTROL WORRYING: NOT AT ALL
3. WORRYING TOO MUCH ABOUT DIFFERENT THINGS: NOT AT ALL
4. TROUBLE RELAXING: NOT AT ALL
8. IF YOU CHECKED OFF ANY PROBLEMS, HOW DIFFICULT HAVE THESE MADE IT FOR YOU TO DO YOUR WORK, TAKE CARE OF THINGS AT HOME, OR GET ALONG WITH OTHER PEOPLE?: NOT DIFFICULT AT ALL
7. FEELING AFRAID AS IF SOMETHING AWFUL MIGHT HAPPEN: NOT AT ALL
GAD7 TOTAL SCORE: 1

## 2023-06-19 ASSESSMENT — PATIENT HEALTH QUESTIONNAIRE - PHQ9
SUM OF ALL RESPONSES TO PHQ QUESTIONS 1-9: 5
10. IF YOU CHECKED OFF ANY PROBLEMS, HOW DIFFICULT HAVE THESE PROBLEMS MADE IT FOR YOU TO DO YOUR WORK, TAKE CARE OF THINGS AT HOME, OR GET ALONG WITH OTHER PEOPLE: SOMEWHAT DIFFICULT
SUM OF ALL RESPONSES TO PHQ QUESTIONS 1-9: 5

## 2023-06-19 ASSESSMENT — PAIN SCALES - GENERAL: PAINLEVEL: NO PAIN (0)

## 2023-06-19 NOTE — LETTER
[facility]  [address] [place] [address]  [phone]                    June 30, 2023    Rakesh Carey  [address]  [facility] [address]      Dear Rakesh,    Here is a summary of your recent test results:    -PSA (prostate specific antigen) test is normal.  This indicates a low likelihood of prostate cancer.  ADVISE: rechecking this in 1 year.   -Cholesterol levels are at your goal levels.  ADVISE: continuing your medication, a regular exercise program with at least 150 minutes of aerobic exercise per week, and eating a low saturated fat/low carbohydrate diet.  Also, you should recheck this fasting cholesterol panel in 12 months.   -Liver and gallbladder tests are normal (ALT,AST, Alk phos, bilirubin), kidney function is normal (Cr, GFR), sodium is normal, potassium is normal, calcium is normal, glucose is normal.   -A1C (diabetes test) is in the prediabetes range.     Your test results are enclosed.      Please contact me if you have any questions.    In addition, here is a list of due or overdue Health Maintenance reminders.    Health Maintenance Due   Topic Date Due    URINE DRUG SCREEN  Never done    Hepatitis B Vaccine (1 of 3 - 3-dose series) Never done    Zoster (Shingles) Vaccine (1 of 2) Never done    LUNG CANCER SCREENING  Never done    COVID-19 Vaccine (3 - Booster for Kailey series) 01/31/2022       Please call us at [phone] (or use [employer]) to address the above recommendations.            Thank you very much for trusting [facility].     Healthy regards,      Carrington Rowe DO           Results for orders placed or performed in visit on 06/19/23   Comprehensive metabolic panel (BMP + Alb, Alk Phos, ALT, AST, Total. Bili, TP)     Status: Normal   Result Value Ref Range    Sodium 138 136 - 145 mmol/L    Potassium 3.9 3.4 - 5.3 mmol/L    Chloride 98 98 - 107 mmol/L    Carbon Dioxide (CO2) 26 22 - 29 mmol/L    Anion Gap 14  7 - 15 mmol/L    Urea Nitrogen 16.6 6.0 - 20.0 mg/dL    Creatinine 0.83 0.67 - 1.17 mg/dL    Calcium 9.3 8.6 - 10.0 mg/dL    Glucose 89 70 - 99 mg/dL    Alkaline Phosphatase 57 40 - 129 U/L    AST 44 0 - 45 U/L    ALT 46 0 - 70 U/L    Protein Total 7.0 6.4 - 8.3 g/dL    Albumin 4.1 3.5 - 5.2 g/dL    Bilirubin Total 0.7 <=1.2 mg/dL    GFR Estimate >90 >60 mL/min/1.73m2   Lipid panel reflex to direct LDL Fasting     Status: Abnormal   Result Value Ref Range    Cholesterol 182 <200 mg/dL    Triglycerides 188 (H) <150 mg/dL    Direct Measure HDL 40 >=40 mg/dL    LDL Cholesterol Calculated 104 (H) <=100 mg/dL    Non HDL Cholesterol 142 (H) <130 mg/dL    Narrative    Cholesterol  Desirable:  <200 mg/dL    Triglycerides  Normal:  Less than 150 mg/dL  Borderline High:  150-199 mg/dL  High:  200-499 mg/dL  Very High:  Greater than or equal to 500 mg/dL    Direct Measure HDL  Female:  Greater than or equal to 50 mg/dL   Male:  Greater than or equal to 40 mg/dL    LDL Cholesterol  Desirable:  <100mg/dL  Above Desirable:  100-129 mg/dL   Borderline High:  130-159 mg/dL   High:  160-189 mg/dL   Very High:  >= 190 mg/dL    Non HDL Cholesterol  Desirable:  130 mg/dL  Above Desirable:  130-159 mg/dL  Borderline High:  160-189 mg/dL  High:  190-219 mg/dL  Very High:  Greater than or equal to 220 mg/dL   PSA, screen     Status: Normal   Result Value Ref Range    Prostate Specific Antigen Screen 0.59 0.00 - 3.50 ng/mL    Narrative    This result is obtained using the Roche Elecsys total PSA method on the augusto [id number] immunoassay analyzer. Results obtained with different assay methods or kits cannot be used interchangeably.   Hemoglobin A1c     Status: Abnormal   Result Value Ref Range    Hemoglobin A1C 6.0 (H) 0.0 - 5.6 %

## 2023-06-19 NOTE — PROGRESS NOTES
SUBJECTIVE:   CC: Rakesh is an 53 year old who presents for preventative health visit.         6/19/2023     4:16 PM   Additional Questions   Roomed by AUBREY HUFF   Accompanied by SELF       Healthy Habits:     Getting at least 3 servings of Calcium per day:  Yes    Bi-annual eye exam:  Yes    Dental care twice a year:  Yes    Sleep apnea or symptoms of sleep apnea:  None    Diet:  Regular (no restrictions)    Frequency of exercise:  None    Taking medications regularly:  Yes    Medication side effects:  None    PHQ-2 Total Score: 1    Additional concerns today:  No      Hyperlipidemia Follow-Up    Are you regularly taking any medication or supplement to lower your cholesterol?   Yes- atorvastatin (LIPITOR) 20 MG tablet  Are you having muscle aches or other side effects that you think could be caused by your cholesterol lowering medication?  No    Hypertension Follow-up    Do you check your blood pressure regularly outside of the clinic? YES - checking at blood donation   Are you following a low salt diet? Yes  Are your blood pressures ever more than 140 on the top number (systolic) OR more   than 90 on the bottom number (diastolic), for example 140/90? Yes     BP Readings from Last 6 Encounters:   06/19/23 132/78   03/06/23 134/84   07/29/22 (!) 161/111   06/13/22 128/80   06/01/22 (!) 146/96   04/16/22 (!) 159/114       Depression Followup  How are you doing with your depression since your last visit? Depends on pain   Have you had a significant life event?  No   Are you feeling anxious or having panic attacks?   No  Do you have any concerns with your use of alcohol or other drugs? No    Social History     Tobacco Use    Smoking status: Former     Packs/day: 0.25     Years: 20.00     Pack years: 5.00     Types: Cigarettes     Quit date: 5/14/2013     Years since quitting: 10.1    Smokeless tobacco: Never    Tobacco comments:     <1/2 ppd   Substance Use Topics    Alcohol use: Yes    Drug use: No         12/31/2020      5:27 PM 7/12/2022     3:10 PM 6/19/2023     3:58 PM   PHQ   PHQ-9 Total Score 2 2 5   Q9: Thoughts of better off dead/self-harm past 2 weeks Not at all Not at all Not at all         12/31/2020     5:27 PM 7/12/2022     3:10 PM 6/19/2023     3:59 PM   THOMAS-7 SCORE   Total Score   1 (minimal anxiety)   Total Score 4 2 1         6/19/2023     3:58 PM   Last PHQ-9   1.  Little interest or pleasure in doing things 1   2.  Feeling down, depressed, or hopeless 0   3.  Trouble falling or staying asleep, or sleeping too much 1   4.  Feeling tired or having little energy 1   5.  Poor appetite or overeating 1   6.  Feeling bad about yourself 0   7.  Trouble concentrating 1   8.  Moving slowly or restless 0   Q9: Thoughts of better off dead/self-harm past 2 weeks 0   PHQ-9 Total Score 5         6/19/2023     3:59 PM   THOMAS-7    1. Feeling nervous, anxious, or on edge 0   2. Not being able to stop or control worrying 0   3. Worrying too much about different things 0   4. Trouble relaxing 0   5. Being so restless that it is hard to sit still 0   6. Becoming easily annoyed or irritable 1   7. Feeling afraid, as if something awful might happen 0   THOMAS-7 Total Score 1   If you checked any problems, how difficult have they made it for you to do your work, take care of things at home, or get along with other people? Not difficult at all       Social History     Tobacco Use    Smoking status: Former     Packs/day: 0.25     Years: 20.00     Pack years: 5.00     Types: Cigarettes     Quit date: 5/14/2013     Years since quitting: 10.1    Smokeless tobacco: Never    Tobacco comments:     <1/2 ppd   Vaping Use    Vaping status: Not on file   Substance Use Topics    Alcohol use: Yes           6/19/2023     4:02 PM   Alcohol Use   Prescreen: >3 drinks/day or >7 drinks/week? No       Last PSA:   PSA   Date Value Ref Range Status   06/10/2021 0.72 0 - 4 ug/L Final     Comment:     Assay Method:  Chemiluminescence using Siemens Vista analyzer  "    Prostate Specific Antigen Screen   Date Value Ref Range Status   06/13/2022 0.68 0.00 - 4.00 ug/L Final       Reviewed orders with patient. Reviewed health maintenance and updated orders accordingly - Yes  Lab work is in process    Reviewed and updated as needed this visit by clinical staff   Tobacco  Allergies  Meds              Reviewed and updated as needed this visit by Provider                     Review of Systems   Constitutional: Negative for chills and fever.   HENT: Positive for hearing loss. Negative for congestion, ear pain and sore throat.    Eyes: Negative for pain and visual disturbance.   Respiratory: Negative for cough and shortness of breath.    Cardiovascular: Negative for chest pain, palpitations and peripheral edema.   Gastrointestinal: Negative for abdominal pain, constipation, diarrhea, heartburn, hematochezia and nausea.   Genitourinary: Negative for dysuria, frequency, genital sores, hematuria, impotence, penile discharge and urgency.   Musculoskeletal: Positive for arthralgias and joint swelling. Negative for myalgias.   Skin: Negative for rash.   Neurological: Negative for dizziness, weakness, headaches and paresthesias.   Psychiatric/Behavioral: Positive for mood changes. The patient is not nervous/anxious.        OBJECTIVE:   /78   Pulse 94   Temp 98.9  F (37.2  C) (Tympanic)   Resp 18   Ht 1.778 m (5' 10\")   Wt (!) 169 kg (372 lb 9.2 oz)   SpO2 96%   BMI 53.46 kg/m      Physical Exam  GENERAL: healthy, alert and no distress  EYES: Eyes grossly normal to inspection, PERRL and conjunctivae and sclerae normal  NECK: no adenopathy and no asymmetry, masses, or scars  RESP: lungs clear to auscultation - no rales, rhonchi or wheezes  CV: regular rates and rhythm, normal S1 S2, no S3 or S4, and no murmur, click or rub  MS: no gross musculoskeletal defects noted, no edema  SKIN: no suspicious lesions or rashes  PSYCH: mentation appears normal, affect " normal/bright    Diagnostic Test Results:  Labs reviewed in Epic    ASSESSMENT/PLAN:   1. Routine general medical examination at a health care facility  Health maintenance reviewed and updated. Emphasized importance of balanced diet and regular exercise.    2. Hyperlipidemia with target LDL less than 130  Recheck lipids, continue atorvastatin.  - Lipid panel reflex to direct LDL Non-fasting; Future  - atorvastatin (LIPITOR) 20 MG tablet; Take 1 tablet (20 mg) by mouth daily  Dispense: 90 tablet; Refill: 3  - Lipid panel reflex to direct LDL Fasting; Future    3. Encounter for long-term (current) use of medications  - gabapentin (NEURONTIN) 300 MG capsule; Take 1 capsule (300 mg) by mouth 3 times daily  Dispense: 270 capsule; Refill: 0  - HYDROcodone-acetaminophen (NORCO) 5-325 MG tablet; Take 1 tablet by mouth 2 times daily as needed for severe pain  Dispense: 30 tablet; Refill: 0    4. Lumbar back pain with radiculopathy affecting right lower extremity  Stable, continue gabapentin. Use Norco sparingly. Consider referral to spine.  - gabapentin (NEURONTIN) 300 MG capsule; Take 1 capsule (300 mg) by mouth 3 times daily  Dispense: 270 capsule; Refill: 0  - HYDROcodone-acetaminophen (NORCO) 5-325 MG tablet; Take 1 tablet by mouth 2 times daily as needed for severe pain  Dispense: 30 tablet; Refill: 0  - ibuprofen (ADVIL/MOTRIN) 800 MG tablet; Take 1 tablet (800 mg) by mouth every 8 hours as needed for moderate pain  Dispense: 90 tablet; Refill: 0    5. BMI 50.0-59.9, adult (H)  Start Ozempic.  - semaglutide (OZEMPIC) 2 MG/3ML pen; Inject 0.25 mg Subcutaneous every 7 days  Dispense: 3 mL; Refill: 0    6. Recurrent cold sores  - acyclovir (ZOVIRAX) 400 MG tablet; Take 1 tablet (400 mg) by mouth 3 times daily  Dispense: 30 tablet; Refill: 5    7. Essential hypertension with goal blood pressure less than 140/90  Well controlled. Continue current regimen.  - hydrochlorothiazide (HYDRODIURIL) 25 MG tablet; Take 1 tablet (25  mg) by mouth daily  Dispense: 90 tablet; Refill: 3  - metoprolol succinate ER (TOPROL XL) 100 MG 24 hr tablet; Take 1 tablet (100 mg) by mouth daily  Dispense: 90 tablet; Refill: 3    8. Screening for prostate cancer  - PSA, screen; Future    9. Screening for diabetes mellitus  - Comprehensive metabolic panel (BMP + Alb, Alk Phos, ALT, AST, Total. Bili, TP); Future  - Hemoglobin A1c; Future      Patient has been advised of split billing requirements and indicates understanding: Yes      COUNSELING:   Reviewed preventive health counseling, as reflected in patient instructions       Regular exercise       Healthy diet/nutrition        He reports that he quit smoking about 10 years ago. His smoking use included cigarettes. He has a 5.00 pack-year smoking history. He has never used smokeless tobacco.            Carrington Rowe DO  [facility] [place]

## 2023-06-20 ENCOUNTER — TELEPHONE (OUTPATIENT)
Dept: FAMILY MEDICINE | Facility: CLINIC | Age: 53
End: 2023-06-20

## 2023-06-20 LAB
ALBUMIN SERPL BCG-MCNC: 4.1 G/DL (ref 3.5–5.2)
ALP SERPL-CCNC: 57 U/L (ref 40–129)
ALT SERPL W P-5'-P-CCNC: 46 U/L (ref 0–70)
ANION GAP SERPL CALCULATED.3IONS-SCNC: 14 MMOL/L (ref 7–15)
AST SERPL W P-5'-P-CCNC: 44 U/L (ref 0–45)
BILIRUB SERPL-MCNC: 0.7 MG/DL
BUN SERPL-MCNC: 16.6 MG/DL (ref 6–20)
CALCIUM SERPL-MCNC: 9.3 MG/DL (ref 8.6–10)
CHLORIDE SERPL-SCNC: 98 MMOL/L (ref 98–107)
CHOLEST SERPL-MCNC: 182 MG/DL
CREAT SERPL-MCNC: 0.83 MG/DL (ref 0.67–1.17)
DEPRECATED HCO3 PLAS-SCNC: 26 MMOL/L (ref 22–29)
GFR SERPL CREATININE-BSD FRML MDRD: >90 ML/MIN/1.73M2
GLUCOSE SERPL-MCNC: 89 MG/DL (ref 70–99)
HDLC SERPL-MCNC: 40 MG/DL
LDLC SERPL CALC-MCNC: 104 MG/DL
NONHDLC SERPL-MCNC: 142 MG/DL
POTASSIUM SERPL-SCNC: 3.9 MMOL/L (ref 3.4–5.3)
PROT SERPL-MCNC: 7 G/DL (ref 6.4–8.3)
PSA SERPL DL<=0.01 NG/ML-MCNC: 0.59 NG/ML (ref 0–3.5)
SODIUM SERPL-SCNC: 138 MMOL/L (ref 136–145)
TRIGL SERPL-MCNC: 188 MG/DL

## 2023-06-20 NOTE — TELEPHONE ENCOUNTER
Central Prior Authorization Team   Phone: [phone]      PA Initiation    Medication: SEMAGLUTIDE(0.25 OR 0.5MG/DOS) 2 MG/3ML SC SOPN  Insurance Company: [employer] - Phone [phone] Fax [phone]  Pharmacy Filling the Rx:    Filling Pharmacy Phone:    Filling Pharmacy Fax:    Start Date: 6/20/2023

## 2023-06-22 NOTE — TELEPHONE ENCOUNTER
Central Prior Authorization Team   Phone: [phone]      PRIOR AUTHORIZATION DENIED    Medication: SEMAGLUTIDE(0.25 OR 0.5MG/DOS) [facility]  Insurance Company: [employer] - Phone [phone] Fax [phone]  Denial Date: 6/20/2023  Denial Rational:         Appeal Information:         Patient Notified: No

## 2023-06-23 NOTE — TELEPHONE ENCOUNTER
Change medication to Wegovy and will resend to pharmacy.    Carrington Rowe DO  6/23/2023 10:01 AM

## 2023-07-05 DIAGNOSIS — M54.16 LUMBAR BACK PAIN WITH RADICULOPATHY AFFECTING RIGHT LOWER EXTREMITY: ICD-10-CM

## 2023-07-05 NOTE — TELEPHONE ENCOUNTER
Routing refill request to provider for review/approval because:  Labs not current:    Lab Results   Component Value Date    WBC 6.8 06/28/2018     Lab Results   Component Value Date    RBC 4.38 06/28/2018     Lab Results   Component Value Date    HGB 12.7 06/28/2018     Lab Results   Component Value Date    HCT 37.6 06/28/2018     No components found for: MCT  Lab Results   Component Value Date    MCV 86 06/28/2018     Lab Results   Component Value Date    MCH 29.0 06/28/2018     Lab Results   Component Value Date    MCHC 33.8 06/28/2018     Lab Results   Component Value Date    RDW 14.4 06/28/2018     Lab Results   Component Value Date     06/28/2018     Delta LERMA RN, BSN

## 2023-07-06 RX ORDER — IBUPROFEN 800 MG/1
TABLET, FILM COATED ORAL
Qty: 90 TABLET | Refills: 11 | Status: SHIPPED | OUTPATIENT
Start: 2023-07-06 | End: 2024-07-09

## 2023-07-28 ENCOUNTER — TRANSFERRED RECORDS (OUTPATIENT)
Dept: HEALTH INFORMATION MANAGEMENT | Facility: CLINIC | Age: 53
End: 2023-07-28
Payer: COMMERCIAL

## 2023-08-22 DIAGNOSIS — I10 HYPERTENSION GOAL BP (BLOOD PRESSURE) < 140/90: ICD-10-CM

## 2023-08-22 RX ORDER — HYDROCHLOROTHIAZIDE 12.5 MG/1
12.5 TABLET ORAL DAILY
Qty: 90 TABLET | Refills: 1 | OUTPATIENT
Start: 2023-08-22

## 2023-08-24 ENCOUNTER — TRANSFERRED RECORDS (OUTPATIENT)
Dept: HEALTH INFORMATION MANAGEMENT | Facility: CLINIC | Age: 53
End: 2023-08-24
Payer: COMMERCIAL

## 2023-09-05 DIAGNOSIS — M54.16 LUMBAR BACK PAIN WITH RADICULOPATHY AFFECTING RIGHT LOWER EXTREMITY: ICD-10-CM

## 2023-09-05 DIAGNOSIS — Z79.899 ENCOUNTER FOR LONG-TERM (CURRENT) USE OF MEDICATIONS: ICD-10-CM

## 2023-09-07 RX ORDER — GABAPENTIN 300 MG/1
CAPSULE ORAL
Qty: 270 CAPSULE | Refills: 3 | Status: SHIPPED | OUTPATIENT
Start: 2023-09-07 | End: 2024-08-13

## 2023-09-27 ENCOUNTER — TRANSFERRED RECORDS (OUTPATIENT)
Dept: HEALTH INFORMATION MANAGEMENT | Facility: CLINIC | Age: 53
End: 2023-09-27
Payer: COMMERCIAL

## 2023-10-09 ENCOUNTER — VIRTUAL VISIT (OUTPATIENT)
Dept: FAMILY MEDICINE | Facility: CLINIC | Age: 53
End: 2023-10-09
Payer: COMMERCIAL

## 2023-10-09 DIAGNOSIS — R06.83 SNORING: Primary | ICD-10-CM

## 2023-10-09 DIAGNOSIS — R40.0 HAS DAYTIME DROWSINESS: ICD-10-CM

## 2023-10-09 DIAGNOSIS — E66.01 SEVERE OBESITY (BMI >= 40) (H): ICD-10-CM

## 2023-10-09 PROCEDURE — 99213 OFFICE O/P EST LOW 20 MIN: CPT | Mod: VID | Performed by: FAMILY MEDICINE

## 2023-10-09 NOTE — PROGRESS NOTES
"Rakesh is a 53 year old who is being evaluated via a billable video visit.      How would you like to obtain your AVS? MyChart  If the video visit is dropped, the invitation should be resent by: Text to cell phone: [phone]   Will anyone else be joining your video visit? No      Assessment & Plan   Snoring  Has daytime drowsiness  Stop bang equals 8 and high risk for obstructive sleep apnea and referral given:  - Adult Sleep Eval & Management Referral    Severe obesity -see patient instructions regarding some weight loss concepts.    BMI:   Estimated body mass index is 53.46 kg/m  as calculated from the following:    Height as of 6/19/23: 1.778 m (5' 10\").    Weight as of 6/19/23: 169 kg (372 lb 9.2 oz).   Weight management plan: Discussed healthy diet and exercise guidelines      Return in about 8 months (around 6/19/2024) for wellness exam with fasting lab.          Anil Galvan MD      [address] [address]  [email or web address]   Office: [phone]       Subjective   Rakesh is a 53 year old, presenting for the following health issues:  Sleep Apnea (Concerns) and Referral (Sleep Study )    HPI     Sleep Apnea Concerns - Patient was told by his wife that he snores really, bad and loud and that he has stopped breathing at times.   - Referral - Sleep Study      Sleep Apnea Screening Questionnaire   Adapted from the STOP Questionnaire (Villavicencio et al, Anesthesiology 2008; 108: 812?21)     1.  Snoring- Do you snore loudly (louder than talking or loud enough to be heard through closed doors)? YES     2. Tired-  Do you often feel tired, fatigued, or sleepy during the daytime? YES    3. Observed- Has anyone observed you stop breathing during your sleep?YES    4. Blood Pressure- Do you have or are you being treated for high blood pressure?  YES     5. BMI- BMI more than 35 kg/m2? -YES   Estimated body mass index is 53.46 kg/m  as calculated from the following:    " "Height as of 6/19/23: 1.778 m (5' 10\").    Weight as of 6/19/23: 169 kg (372 lb 9.2 oz).    6. Age- Age over 50 yrs. Old? (53 year old) -YES     7. Neck circumference- Neck circumference greater than 40 cm? YES    8. Gender- Gender Male? -YES    HIGH risk of JENNY: answering yes to three or more items = 8      Review of Systems       Objective         Vitals:  No vitals were obtained today due to virtual visit.    Physical Exam   GENERAL: Healthy, alert and no distress  EYES: Eyes grossly normal to inspection.  No discharge or erythema, or obvious scleral/conjunctival abnormalities.  RESP: No audible wheeze, cough, or visible cyanosis.  No visible retractions or increased work of breathing.    SKIN: Visible skin clear. No significant rash, abnormal pigmentation or lesions.  NEURO: Cranial nerves grossly intact.  Mentation and speech appropriate for age.  PSYCH: Mentation appears normal, affect normal/bright, judgement and insight intact, normal speech and appearance well-groomed.          Video-Visit Details    Type of service:  Video Visit     Originating Location (pt. Location): Home    Distant Location (provider location):  On-site  Platform used for Video Visit: Dustin      "

## 2023-10-09 NOTE — PATIENT INSTRUCTIONS
Eat Better ? Move More ? Live Well     Eat 3 nutrient-rich meals each day    Don t skip meals--it will cause you to overeat later in the day!    Eating fiber (vegetables/fruits/whole grains) and protein with meals helps you stay full longer    Choose foods with less than 10 grams of sugar and 5 grams of fat per serving to prevent excess calories and weight re-gain  Eat around the same times each day to develop a routine eating schedule   Avoid snacking unless physically hungry.   Planned snacks: 1-2 times per day and no more than 150 calories    Eat protein first   Protein helps with healing, maintaining adequate muscle mass, reducing hunger and optimizing nutritional status   Aim for 60-80 grams of protein per day   Fill up on Fiber   Fiber comes from plants--fruits, veggies, whole grains, nuts/seeds and beans   Fiber is low in calories, high in phytonutrients and helps you stay full longer   Aim for 25-35 grams per day by eating fiber with meals and snacks  Eat S-[id number]   Take 20-30 minutes to eat each meal by taking small bites, chewing foods to applesauce consistency or 20-30 times before you swallow   Eating foods too fast can delay satiety/fullness signals and increase overeating   Slow down your eating by using toddler utensils, putting your fork/spoon down between bites and not watching TV or emailing during meals!   Keep a Journal         Writing down what you eat, how you feel and when you are active helps you identify new changes to work on from week to week         Look for ways to cut 100 calories from your current diet 2-3 times per day  Drink 64 ounces of 0-Calorie drinks between meals   Water   Zero calorie [employer]el  or Vitamin Water     [employer]  Zero Calories   Crystal Light , Sugar-Free Mamadou-Aid , and other sugar-free lemonade or flavored rodrigues   Keep Caffeine to less than 300mg per day ie: 3-6oz cups coffee      Work up to 45-60 minutes of physical activity most days of the week   Helps  with losing weight and prevent regaining those extra pounds!    Do a combo of cardio (walking/water exercises) and strength training (lifting weights/Vinyasa yoga)     Avoid Mindless Eating   Be present when you eat--take note of the smell, taste and quality of your food   Make a list of alternative activities you could do to prevent eating out of boredom/stress  Go for a walk, call a friend, chew gum, paint your nails, re-organize the garage, etc        Diet and advice to rev-up metabolism for weight loss:       Try to keep your gram to gram ratio of carbs:protein to 12-15grams with small amount of monounsaturated fat -like olive oil.  Eat small meals 5-6x/day.     Lean protein = 2 egg whites or 1 whole egg, or turkey, chicken, fish.  Low-glycemic fruits = strawberries, blueberries, raspberries, blackberries, nectarines, grapefruit, oranges,  apples.      2 oz. Lean protein + 1/2 cup  low-glycemic fruit --- breakfast and midmorning snack .     2  oz. Lean protein + 1/4 cup whole grain rice or sweet potato + 1 cup green veggie - lunch, dinner ( increase protein to 3 oz) , and afternoon snack.      Evening snack : 1/2 cup of low glycemic fruit or an apple.        For occasional pasta - try Barilla Plus - has protein in it. - keep to 1/2 cup instead of your 1/4 cup of rice or potato.     Take your fish oil capsules 2,000mg daily - with your probiotic, if you take one. Take a multivitamin daily.     Try to keep your gram to gram ratio of carbs:protein to 12-15grams of each /small meal 5-6x/day.     If 5-6 small meals/day - too labor intensive, then keep to 3 meals plus 1 snack with 3 oz lean protein per meal with 2- 3 oz protein in your snack.     AVOID DAIRY AND GRAINS, if you can.   Keep your sodium to 600-1000g per day or less.     Substitutions:   In place of 1 meal/snack a day - you can have- 10 almonds, 4 walnut halves, 5 cashews, 6 pecan halves, or 1/8 cup of sunflower seeds or pistachios  (shelled).      In  place of 1 meal/snack a day (1-2x/week) - 1/4 of an Avocado with lettuce wrap -like seb , etc.     In place of 1 meal/snack per day - can have a protein bar no more than 130 calories and 15grams each  of sugar /total carbs and protein.   Built bars from builtbar.com or Fit Crunch Bars from [employer] ([employer]) or Ecomsual Protein bars - look at the labels.     Drink a lot of water - approx 60 oz/day.      Add in 30-45 minutes of brisk walking/day.

## 2023-10-26 ENCOUNTER — TRANSFERRED RECORDS (OUTPATIENT)
Dept: HEALTH INFORMATION MANAGEMENT | Facility: CLINIC | Age: 53
End: 2023-10-26
Payer: COMMERCIAL

## 2023-11-03 ENCOUNTER — PATIENT OUTREACH (OUTPATIENT)
Dept: GASTROENTEROLOGY | Facility: CLINIC | Age: 53
End: 2023-11-03
Payer: COMMERCIAL

## 2023-11-22 ENCOUNTER — TRANSFERRED RECORDS (OUTPATIENT)
Dept: HEALTH INFORMATION MANAGEMENT | Facility: CLINIC | Age: 53
End: 2023-11-22
Payer: COMMERCIAL

## 2023-12-20 ENCOUNTER — NURSE TRIAGE (OUTPATIENT)
Dept: FAMILY MEDICINE | Facility: CLINIC | Age: 53
End: 2023-12-20
Payer: COMMERCIAL

## 2023-12-20 NOTE — TELEPHONE ENCOUNTER
Situation     Wife called and conference patient in on call.   Patient calling to request an adjustment in his blood pressure medication.     The patient explains that his blood pressure has been averaging 156/106-109- 2 times a week when he goes in to donate plasma.     Has been taking hydrochlorothiazide and metoprolol- has not missed a dose.     Patient states he has mild SOB with activity for the past year and moderate with more strenuous activity for the past year.   Patient states he has to rest after one flight of stairs- same for the past year  Denied SOB at rest.     Denied Chest pain   No headache  Spriatic swelling in legs ankles and feet-a couple times a month. none today.      No unsteady gait.   No blurry vision or vision changes.     Recommendations   Reviewed - protocol recommendations UC/ED - advised of rationale.   Declined UC or er wife and patient stated frustration with that advise.    Patient states he is agreeable to an apt next week and needs the latest appointment       Did schedule as a place guajardo 12/26 arrival 310 pm   Advised will route to pcp to see if he would make medication adjust before appointment.     Reason for Disposition   Systolic BP >= 160 OR Diastolic >= 100   Systolic BP >= 160 OR Diastolic >= 100, and any cardiac or neurologic symptoms (e.g., chest pain, difficulty breathing, unsteady gait, blurred vision)     Patient states he has mild SOB with activity for the past year and moderate with more strenuous activity for the past year.     Patient states he has to rest after one flight of stairs- same for the past year    Additional Information   Negative: Pregnant > 20 weeks or postpartum (< 6 weeks after delivery) and new hand or face swelling   Negative: Pregnant > 20 weeks and BP > 140/90   Negative: Patient sounds very sick or weak to the triager   Negative: BP Systolic BP >= 140 OR Diastolic >= 90 and postpartum (from 0 to 6 weeks after delivery)   Negative: Systolic BP  >= 180 OR Diastolic >= 110, and missed most recent dose of blood pressure medication   Negative: Systolic BP >= 180 OR Diastolic >= 110   Negative: Patient wants to be seen   Negative: Ran out of BP medications   Negative: Taking BP medications and feels is having side effects (e.g., impotence, cough, dizziness)    Protocols used: High Blood Pressure-A-OH

## 2023-12-21 ENCOUNTER — TRANSFERRED RECORDS (OUTPATIENT)
Dept: HEALTH INFORMATION MANAGEMENT | Facility: CLINIC | Age: 53
End: 2023-12-21
Payer: COMMERCIAL

## 2023-12-21 NOTE — TELEPHONE ENCOUNTER
Attempt #1   Called Phone # [phone]      Left a non detailed voicemail to please call back and ask for any available triage nurse regarding your phone call from yesterday @ [phone].     Telma LOPEZ RN   [facility] Triage

## 2023-12-21 NOTE — TELEPHONE ENCOUNTER
Follow up at appointment. If any worsening of symptoms in the meantime, advise being seen in ED    Carrington Rowe DO  12/20/2023 11:54 PM

## 2023-12-21 NOTE — TELEPHONE ENCOUNTER
Pt calls back, advised of recommendations. Pt reports understanding and agrees.     Lenora Black RN [facility]

## 2023-12-26 ENCOUNTER — OFFICE VISIT (OUTPATIENT)
Dept: FAMILY MEDICINE | Facility: CLINIC | Age: 53
End: 2023-12-26
Payer: COMMERCIAL

## 2023-12-26 VITALS
HEIGHT: 70 IN | BODY MASS INDEX: 45.1 KG/M2 | TEMPERATURE: 97.3 F | DIASTOLIC BLOOD PRESSURE: 98 MMHG | SYSTOLIC BLOOD PRESSURE: 140 MMHG | WEIGHT: 315 LBS | HEART RATE: 96 BPM | OXYGEN SATURATION: 93 % | RESPIRATION RATE: 18 BRPM

## 2023-12-26 DIAGNOSIS — I10 ESSENTIAL HYPERTENSION WITH GOAL BLOOD PRESSURE LESS THAN 140/90: Primary | ICD-10-CM

## 2023-12-26 DIAGNOSIS — Z12.11 SCREEN FOR COLON CANCER: ICD-10-CM

## 2023-12-26 PROCEDURE — 36415 COLL VENOUS BLD VENIPUNCTURE: CPT | Performed by: FAMILY MEDICINE

## 2023-12-26 PROCEDURE — 80048 BASIC METABOLIC PNL TOTAL CA: CPT | Performed by: FAMILY MEDICINE

## 2023-12-26 PROCEDURE — 99214 OFFICE O/P EST MOD 30 MIN: CPT | Performed by: FAMILY MEDICINE

## 2023-12-26 RX ORDER — CARVEDILOL 12.5 MG/1
12.5 TABLET ORAL 2 TIMES DAILY WITH MEALS
Qty: 180 TABLET | Refills: 1 | Status: SHIPPED | OUTPATIENT
Start: 2023-12-26 | End: 2024-01-02

## 2023-12-26 NOTE — PROGRESS NOTES
Assessment & Plan     Essential hypertension with goal blood pressure less than 140/90  BP not at goal. HR regular but slightly elevated. Will try changing beta-blocker from metoprolol to carvedilol to help more with BP control while also helping control rate. Follow up in 1 week with pharmacy to recheck BP. Consider titrating beta-blocker or adding ACEi in the future.  - carvedilol (COREG) 12.5 MG tablet; Take 1 tablet (12.5 mg) by mouth 2 times daily (with meals)  - Basic metabolic panel  (Ca, Cl, CO2, Creat, Gluc, K, Na, BUN); Future  - Basic metabolic panel  (Ca, Cl, CO2, Creat, Gluc, K, Na, BUN)    Screen for colon cancer  - Colonoscopy Screening  Referral; Future      Carrington Rowe DO  [facility] [place] Cameron is a 53 year old, presenting for the following health issues:  Hypertension      12/26/2023     2:56 PM   Additional Questions   Roomed by hedy lewis       History of Present Illness       Hypertension: He presents for follow up of hypertension.  He does check blood pressure  regularly outside of the clinic. Outside blood pressures have been over 140/90. He follows a low salt diet.     Reason for visit:  High blood pressure and high heart rate    He eats 0-1 servings of fruits and vegetables daily.He consumes 1 sweetened beverage(s) daily.He exercises with enough effort to increase his heart rate 9 or less minutes per day.  He exercises with enough effort to increase his heart rate 3 or less days per week.   He is taking medications regularly.       He donates plasma and has found BP has been running in the mid 140s on average if not higher. He states he can feel his heart beating faster as well -- consistently in the 90s. Denies any chest pain but has noticed feeling more short of breath but does acknowledge this is affected by weight gain. Otherwise denies any headaches, lightheadedness, dizziness, vision changes, chest pain, palpitations,  "numbness/tingling.      Review of Systems   Constitutional, HEENT, cardiovascular, pulmonary, gi and gu systems are negative, except as otherwise noted.      Objective    BP (!) 142/100   Pulse 113   Temp 97.3  F (36.3  C)   Resp 18   Ht 1.778 m (5' 10\")   Wt (!) 169.2 kg (373 lb)   SpO2 93%   BMI 53.52 kg/m    Body mass index is 53.52 kg/m .  Physical Exam   GENERAL: healthy, alert and no distress  RESP: lungs clear to auscultation - no rales, rhonchi or wheezes  CV: regular rates and rhythm, normal S1 S2, no S3 or S4, and no murmur, click or rub  PSYCH: mentation appears normal, affect normal/bright                "

## 2023-12-27 LAB
ANION GAP SERPL CALCULATED.3IONS-SCNC: 11 MMOL/L (ref 7–15)
BUN SERPL-MCNC: 16.3 MG/DL (ref 6–20)
CALCIUM SERPL-MCNC: 9.2 MG/DL (ref 8.6–10)
CHLORIDE SERPL-SCNC: 98 MMOL/L (ref 98–107)
CREAT SERPL-MCNC: 0.81 MG/DL (ref 0.67–1.17)
DEPRECATED HCO3 PLAS-SCNC: 32 MMOL/L (ref 22–29)
EGFRCR SERPLBLD CKD-EPI 2021: >90 ML/MIN/1.73M2
GLUCOSE SERPL-MCNC: 102 MG/DL (ref 70–99)
POTASSIUM SERPL-SCNC: 4.3 MMOL/L (ref 3.4–5.3)
SODIUM SERPL-SCNC: 141 MMOL/L (ref 135–145)

## 2023-12-28 ENCOUNTER — TRANSFERRED RECORDS (OUTPATIENT)
Dept: HEALTH INFORMATION MANAGEMENT | Facility: CLINIC | Age: 53
End: 2023-12-28
Payer: COMMERCIAL

## 2023-12-28 ENCOUNTER — TELEPHONE (OUTPATIENT)
Dept: FAMILY MEDICINE | Facility: CLINIC | Age: 53
End: 2023-12-28
Payer: COMMERCIAL

## 2023-12-28 NOTE — TELEPHONE ENCOUNTER
Lottie HOWARD from [facility] that is doing patient's DOT physical will be faxing in a form for Dr. Rowe to fill out - patient is on hydrocodone and has been on this rx for years. DOT provider needs PCP to complete form with okay to be on rx. Lottie will fax form to [phone]. Awaiting forms.

## 2024-01-02 ENCOUNTER — ALLIED HEALTH/NURSE VISIT (OUTPATIENT)
Dept: FAMILY MEDICINE | Facility: CLINIC | Age: 54
End: 2024-01-02
Payer: COMMERCIAL

## 2024-01-02 VITALS — SYSTOLIC BLOOD PRESSURE: 147 MMHG | HEART RATE: 86 BPM | DIASTOLIC BLOOD PRESSURE: 91 MMHG

## 2024-01-02 DIAGNOSIS — I10 HYPERTENSION GOAL BP (BLOOD PRESSURE) < 140/90: Primary | ICD-10-CM

## 2024-01-02 DIAGNOSIS — I10 ESSENTIAL HYPERTENSION WITH GOAL BLOOD PRESSURE LESS THAN 140/90: ICD-10-CM

## 2024-01-02 PROCEDURE — 99207 PR NO CHARGE NURSE ONLY: CPT | Performed by: FAMILY MEDICINE

## 2024-01-02 RX ORDER — CARVEDILOL 25 MG/1
25 TABLET ORAL 2 TIMES DAILY WITH MEALS
Qty: 180 TABLET | Refills: 1 | Status: SHIPPED | OUTPATIENT
Start: 2024-01-02 | End: 2024-04-29

## 2024-01-02 NOTE — PROGRESS NOTES
Rakesh Carey was evaluated at [facility] on January 2, 2024 at which time his blood pressure was:    BP Readings from Last 1 Encounters:   01/02/24 (!) 147/91     No data recorded      Reviewed lifestyle modifications for blood pressure control and reduction: including making healthy food choices, managing weight, getting regular exercise, smoking cessation, reducing alcohol consumption, monitoring blood pressure regularly.     Symptoms: None    BP Goal:< 140/90 mmHg    BP Assessment:  BP too high    Potential Reasons for BP too high: Dose of BP medication too low    BP Follow-Up Plan: Referral to PCP    Recommendation to Provider: Rakesh has been on carvedilol for 1 week now. His BP is still elevated. Rakesh would benefit from dose increase of carvedilol or additional medication added to regimen (also on hydrochlorothiazide).     Nellie Grimm, PharmD  [facility]  PH: [phone]      Note completed by: Nellie Grimm [facility]

## 2024-01-03 NOTE — PROGRESS NOTES
Recommend increasing carvedilol to 25 mg BID. New Rx sent in. He can also take two of the 12.5 mg tablets twice daily.    Carrington Rowe,   1/2/2024 10:11 PM

## 2024-01-04 NOTE — PROGRESS NOTES
Called and advised patient of providers note.       Gets BP checked 2 times a week with plasma donation.     Advised to call triage line if BP is persistently > 130/80 and or experiencing symptoms (reviewed)     Patient states understanding and is agreeable to plan

## 2024-01-11 ENCOUNTER — TELEPHONE (OUTPATIENT)
Dept: FAMILY MEDICINE | Facility: CLINIC | Age: 54
End: 2024-01-11
Payer: COMMERCIAL

## 2024-01-11 NOTE — TELEPHONE ENCOUNTER
Forms/Letter Request    Type of form/letter:   form from [facility]    Have you been seen for this request: N/A    Do we have the form/letter: Yes: given to Dr Rowe    Who is the form from? Mary [facility]    Where did/will the form come from? form was faxed in    When is form/letter needed by: when available    How would you like the form/letter returned: Fax : [phone]

## 2024-01-12 NOTE — TELEPHONE ENCOUNTER
Patient's form signed, dated, and placed in TC basket.    Carrington Rowe DO  1/12/2024 10:33 AM

## 2024-01-25 ENCOUNTER — TRANSFERRED RECORDS (OUTPATIENT)
Dept: HEALTH INFORMATION MANAGEMENT | Facility: CLINIC | Age: 54
End: 2024-01-25
Payer: COMMERCIAL

## 2024-02-07 ENCOUNTER — ALLIED HEALTH/NURSE VISIT (OUTPATIENT)
Dept: FAMILY MEDICINE | Facility: CLINIC | Age: 54
End: 2024-02-07
Payer: COMMERCIAL

## 2024-02-07 DIAGNOSIS — I10 HYPERTENSION GOAL BP (BLOOD PRESSURE) < 140/90: Primary | ICD-10-CM

## 2024-02-07 PROCEDURE — 99207 PR NO CHARGE NURSE ONLY: CPT | Performed by: FAMILY MEDICINE

## 2024-02-07 NOTE — PROGRESS NOTES
Rakesh Carey was evaluated at [facility] on February 7, 2024 at which time his blood pressure was:    Patient gets BP checked when he donates plasma and last BP check was 118/76.     Systolic (Patient Reported): 118 (2/7/2024 10:44 AM)  Diastolic (Patient Reported): 76 (2/7/2024 10:44 AM)        Reviewed lifestyle modifications for blood pressure control and reduction: including making healthy food choices, managing weight, getting regular exercise, smoking cessation, reducing alcohol consumption, monitoring blood pressure regularly.     Symptoms: None    BP Goal:< 140/90 mmHg    BP Assessment:  BP at goal    Potential Reasons for BP too high: NA - Not applicable    BP Follow-Up Plan: Recheck BP in 6 months at pharmacy    Recommendation to Provider: no change at this time.    Nellie Grimm, PharmD  [facility]  PH: [phone]      Note completed by: Nellie Grimm [facility]

## 2024-02-22 ENCOUNTER — TRANSFERRED RECORDS (OUTPATIENT)
Dept: HEALTH INFORMATION MANAGEMENT | Facility: CLINIC | Age: 54
End: 2024-02-22
Payer: COMMERCIAL

## 2024-02-27 DIAGNOSIS — I10 ESSENTIAL HYPERTENSION WITH GOAL BLOOD PRESSURE LESS THAN 140/90: ICD-10-CM

## 2024-02-28 RX ORDER — METOPROLOL SUCCINATE 100 MG/1
100 TABLET, EXTENDED RELEASE ORAL DAILY
Qty: 90 TABLET | Refills: 3 | Status: SHIPPED | OUTPATIENT
Start: 2024-02-28 | End: 2024-08-13

## 2024-04-24 ENCOUNTER — TRANSFERRED RECORDS (OUTPATIENT)
Dept: HEALTH INFORMATION MANAGEMENT | Facility: CLINIC | Age: 54
End: 2024-04-24
Payer: COMMERCIAL

## 2024-04-25 ENCOUNTER — MYC MEDICAL ADVICE (OUTPATIENT)
Dept: FAMILY MEDICINE | Facility: CLINIC | Age: 54
End: 2024-04-25
Payer: COMMERCIAL

## 2024-04-25 DIAGNOSIS — I10 ESSENTIAL HYPERTENSION WITH GOAL BLOOD PRESSURE LESS THAN 140/90: ICD-10-CM

## 2024-04-26 ENCOUNTER — TRANSFERRED RECORDS (OUTPATIENT)
Dept: HEALTH INFORMATION MANAGEMENT | Facility: CLINIC | Age: 54
End: 2024-04-26

## 2024-04-29 RX ORDER — CARVEDILOL 25 MG/1
25 TABLET ORAL 2 TIMES DAILY WITH MEALS
Qty: 180 TABLET | Refills: 0 | Status: SHIPPED | OUTPATIENT
Start: 2024-04-29 | End: 2024-08-13

## 2024-04-29 NOTE — TELEPHONE ENCOUNTER
3 month supply sent in.     HANNAH TAFOYA RN on 4/29/2024 at 11:50 AM   [facility]     170.18

## 2024-05-03 ENCOUNTER — TRANSFERRED RECORDS (OUTPATIENT)
Dept: HEALTH INFORMATION MANAGEMENT | Facility: CLINIC | Age: 54
End: 2024-05-03
Payer: COMMERCIAL

## 2024-05-06 ENCOUNTER — VIRTUAL VISIT (OUTPATIENT)
Dept: FAMILY MEDICINE | Facility: CLINIC | Age: 54
End: 2024-05-06
Payer: COMMERCIAL

## 2024-05-06 ENCOUNTER — TELEPHONE (OUTPATIENT)
Dept: FAMILY MEDICINE | Facility: CLINIC | Age: 54
End: 2024-05-06
Payer: COMMERCIAL

## 2024-05-06 DIAGNOSIS — R79.9 ABNORMAL BLOOD CHEMISTRY TEST: Primary | ICD-10-CM

## 2024-05-06 PROCEDURE — 99441 PR PHYSICIAN TELEPHONE EVALUATION 5-10 MIN: CPT | Mod: 93 | Performed by: FAMILY MEDICINE

## 2024-05-06 NOTE — TELEPHONE ENCOUNTER
Reason for Call:  Appointment Request    Patient requesting this type of appt:  office visit    Requested provider: Carrington Rowe or anybody at [facility]    Reason patient unable to be scheduled: Not within requested timeframe    When does patient want to be seen/preferred time: 1-2 days    Comments: Pt's spouse calling, states he usually gives plasma, they will not allow him any longer due to a high beta blood levels, he would like to get this checked out ASAP, pt is off this Friday 5/10 and is looking to see anybody at [facility] if possible to get more labs done and discuss further. He is concerned of cancer or other possibilities.    Could we send this information to you in [employer] or would you prefer to receive a phone call?:   Patient would prefer a phone call   Okay to leave a detailed message?: Yes at Cell number on file:    Telephone Information:   Mobile [phone]       Call taken on 5/6/2024 at 9:07 AM by Rosemary Hendrix

## 2024-05-06 NOTE — PROGRESS NOTES
"Rakesh is a 54 year old who is being evaluated via a billable telephone visit        Assessment & Plan     Abnormal blood chemistry test  Unclear testing that was abnormal. He will find out definitively what the tests are that were completed. ?beta globulin. Consider rechecking CMP, CBC, SPEP.      BMI  Estimated body mass index is 53.52 kg/m  as calculated from the following:    Height as of 12/26/23: 1.778 m (5' 10\").    Weight as of 12/26/23: 169.2 kg (373 lb).       Subjective   Rakesh is a 54 year old, presenting for the following health issues:  blood test concerns      5/6/2024     5:14 PM   Additional Questions   Roomed by hedy NÚÑEZ     He states that last week went to donate plasma but was told he couldn't because there was \"beta\" in his blood. He doesn't have a copy of these results but recently had repeat blood work at plasma donation center    Review of Systems  Constitutional, HEENT, cardiovascular, pulmonary, gi and gu systems are negative, except as otherwise noted.      Objective           Vitals:  No vitals were obtained today due to virtual visit.    Physical Exam   GENERAL: alert and no distress  PSYCH: Appropriate affect, tone, and pace of words          Telephone-Visit Duration: 7 minutes    "

## 2024-05-08 DIAGNOSIS — I10 ESSENTIAL HYPERTENSION WITH GOAL BLOOD PRESSURE LESS THAN 140/90: ICD-10-CM

## 2024-05-09 RX ORDER — HYDROCHLOROTHIAZIDE 25 MG/1
25 TABLET ORAL DAILY
Qty: 90 TABLET | Refills: 3 | Status: SHIPPED | OUTPATIENT
Start: 2024-05-09

## 2024-05-12 DIAGNOSIS — K21.00 GASTROESOPHAGEAL REFLUX DISEASE WITH ESOPHAGITIS WITHOUT HEMORRHAGE: ICD-10-CM

## 2024-05-14 RX ORDER — OMEPRAZOLE 40 MG/1
40 CAPSULE, DELAYED RELEASE ORAL DAILY
Qty: 90 CAPSULE | Refills: 0 | Status: SHIPPED | OUTPATIENT
Start: 2024-05-14 | End: 2024-08-09

## 2024-05-21 ENCOUNTER — DOCUMENTATION ONLY (OUTPATIENT)
Dept: FAMILY MEDICINE | Facility: CLINIC | Age: 54
End: 2024-05-21
Payer: COMMERCIAL

## 2024-05-21 NOTE — PROGRESS NOTES
PT HAS UPCOMING LAB VISIT -PT STATES IT'S FOR BETA BLOOD LEVELS.  HE ALSO HAS A PHYSICAL COMING UP NEXT MONTH

## 2024-05-23 ENCOUNTER — TRANSFERRED RECORDS (OUTPATIENT)
Dept: HEALTH INFORMATION MANAGEMENT | Facility: CLINIC | Age: 54
End: 2024-05-23
Payer: COMMERCIAL

## 2024-05-23 DIAGNOSIS — E78.5 HYPERLIPIDEMIA WITH TARGET LDL LESS THAN 130: ICD-10-CM

## 2024-05-23 NOTE — PROGRESS NOTES
I'm still not sure which beta test he needs. He was going to get the lab tests from the plasma center sent here. Can we find out what was abnormal when they checked his blood?    Carrington Rowe,   5/23/2024 12:44 PM

## 2024-05-23 NOTE — PROGRESS NOTES
Patient calling back and advised of message below. Patient presented understanding and will  records from [employer], and try to send via [employer] but if not able will bring to clinic for Dr. Rowe to review.     Upcoming lab only appointment cancelled for now, until PCP receives and reviews labs and place appropriate orders. Closing encounter.

## 2024-05-23 NOTE — PROGRESS NOTES
Placed call to patient to inquire if lab tests were sent to PCPs office. Patient reports he called them, but staff were not able to send results.     Writer placed call to [facility] Bobby Dalton [phone] and spoke with Elian to inquire process of having records sent to PCP clinic.      Elian reports per their policy records cannot be sent outside. Patient has to physically  records - can just walk in, no need for appt.     **LDVM on mobile device, per patient's consent that he needs to  results/records at [facility] and bring to Dr. Rowe to review.

## 2024-05-24 ENCOUNTER — TELEPHONE (OUTPATIENT)
Dept: FAMILY MEDICINE | Facility: CLINIC | Age: 54
End: 2024-05-24

## 2024-05-24 DIAGNOSIS — R77.8 ABNORMAL SERUM PROTEIN ELECTROPHORESIS: Primary | ICD-10-CM

## 2024-05-24 RX ORDER — ATORVASTATIN CALCIUM 20 MG/1
20 TABLET, FILM COATED ORAL DAILY
Qty: 90 TABLET | Refills: 0 | Status: SHIPPED | OUTPATIENT
Start: 2024-05-24 | End: 2024-08-13

## 2024-05-24 NOTE — TELEPHONE ENCOUNTER
Prescription approved per [facility] Refill Protocol.  Marcela Robert, RN  [facility] Triage Nurse

## 2024-05-24 NOTE — TELEPHONE ENCOUNTER
[facility] calls.     Patient donates plasma there. Patient is present with them at time of call.     S-(situation): Alan calls because patient failed a serum protein electrophoresis, failed for the same fraction, beta, both times.     B-(background): patient has been a regular donor for the past year, donates twice a week.     A-(assessment): they test for albumin, alpha 1, alpha 2, beta, gamma, total serum protein. They test regularly every 4 months.     5/3/24 beta fraction abnormal 22.0  4/26/24 - beta fraction abnormal 22.4    [facility] [place] believes abnormal labs from repeated donations and if a patient fails 2 tests, they have them wait 6 weeks and repeat the test. If still abnormal they have them see their primary. However patient doesn't want to wait 6 weeks, wants PCP recommendations now.     They will fax reports to us at [phone]    Writer also spoke with patient, informed him I will get a note to Dr. Rowe and will call him back with recommendations. He otherwise feels fine.     R-(recommendations): Routing to provider to review and advise.     Stephanie Caldwell RN  [facility]

## 2024-05-27 NOTE — TELEPHONE ENCOUNTER
Future lab orders placed to recheck labs. Please help schedule lab-only appointment.    Carrington Rowe DO  5/27/2024 3:48 PM

## 2024-05-29 NOTE — TELEPHONE ENCOUNTER
Routing to [facility] and south to assist in scheduling.   Will need a fasting lab appointment.       Thank you.

## 2024-06-07 ENCOUNTER — LAB (OUTPATIENT)
Dept: LAB | Facility: CLINIC | Age: 54
End: 2024-06-07
Payer: COMMERCIAL

## 2024-06-07 DIAGNOSIS — R77.8 ABNORMAL SERUM PROTEIN ELECTROPHORESIS: ICD-10-CM

## 2024-06-07 DIAGNOSIS — E78.5 HYPERLIPIDEMIA WITH TARGET LDL LESS THAN 130: ICD-10-CM

## 2024-06-07 LAB
ALBUMIN SERPL BCG-MCNC: 4 G/DL (ref 3.5–5.2)
ALP SERPL-CCNC: 73 U/L (ref 40–150)
ALT SERPL W P-5'-P-CCNC: 76 U/L (ref 0–70)
ANION GAP SERPL CALCULATED.3IONS-SCNC: 11 MMOL/L (ref 7–15)
AST SERPL W P-5'-P-CCNC: 99 U/L (ref 0–45)
BILIRUB SERPL-MCNC: 0.8 MG/DL
BUN SERPL-MCNC: 8.1 MG/DL (ref 6–20)
CALCIUM SERPL-MCNC: 9.1 MG/DL (ref 8.6–10)
CHLORIDE SERPL-SCNC: 94 MMOL/L (ref 98–107)
CHOLEST SERPL-MCNC: 195 MG/DL
CREAT SERPL-MCNC: 0.84 MG/DL (ref 0.67–1.17)
DEPRECATED HCO3 PLAS-SCNC: 32 MMOL/L (ref 22–29)
EGFRCR SERPLBLD CKD-EPI 2021: >90 ML/MIN/1.73M2
ERYTHROCYTE [DISTWIDTH] IN BLOOD BY AUTOMATED COUNT: 13.7 % (ref 10–15)
FASTING STATUS PATIENT QL REPORTED: YES
FASTING STATUS PATIENT QL REPORTED: YES
GLUCOSE SERPL-MCNC: 143 MG/DL (ref 70–99)
HCT VFR BLD AUTO: 44 % (ref 40–53)
HDLC SERPL-MCNC: 44 MG/DL
HGB BLD-MCNC: 14.6 G/DL (ref 13.3–17.7)
LDLC SERPL CALC-MCNC: 131 MG/DL
MCH RBC QN AUTO: 30.7 PG (ref 26.5–33)
MCHC RBC AUTO-ENTMCNC: 33.2 G/DL (ref 31.5–36.5)
MCV RBC AUTO: 93 FL (ref 78–100)
NONHDLC SERPL-MCNC: 151 MG/DL
PLATELET # BLD AUTO: 196 10E3/UL (ref 150–450)
POTASSIUM SERPL-SCNC: 3.8 MMOL/L (ref 3.4–5.3)
PROT SERPL-MCNC: 7.2 G/DL (ref 6.4–8.3)
RBC # BLD AUTO: 4.75 10E6/UL (ref 4.4–5.9)
SODIUM SERPL-SCNC: 137 MMOL/L (ref 135–145)
TOTAL PROTEIN SERUM FOR ELP: 7 G/DL (ref 6.4–8.3)
TRIGL SERPL-MCNC: 101 MG/DL
WBC # BLD AUTO: 5.4 10E3/UL (ref 4–11)

## 2024-06-07 PROCEDURE — 80061 LIPID PANEL: CPT

## 2024-06-07 PROCEDURE — 84155 ASSAY OF PROTEIN SERUM: CPT | Mod: 59

## 2024-06-07 PROCEDURE — 85027 COMPLETE CBC AUTOMATED: CPT

## 2024-06-07 PROCEDURE — 80053 COMPREHEN METABOLIC PANEL: CPT

## 2024-06-07 PROCEDURE — 84165 PROTEIN E-PHORESIS SERUM: CPT | Performed by: PATHOLOGY

## 2024-06-07 PROCEDURE — 36415 COLL VENOUS BLD VENIPUNCTURE: CPT

## 2024-06-10 LAB
ALBUMIN SERPL ELPH-MCNC: 3.7 G/DL (ref 3.7–5.1)
ALPHA1 GLOB SERPL ELPH-MCNC: 0.4 G/DL (ref 0.2–0.4)
ALPHA2 GLOB SERPL ELPH-MCNC: 0.8 G/DL (ref 0.5–0.9)
B-GLOBULIN SERPL ELPH-MCNC: 1 G/DL (ref 0.6–1)
GAMMA GLOB SERPL ELPH-MCNC: 1.2 G/DL (ref 0.7–1.6)
M PROTEIN SERPL ELPH-MCNC: 0 G/DL
PROT PATTERN SERPL ELPH-IMP: NORMAL

## 2024-07-08 DIAGNOSIS — M54.16 LUMBAR BACK PAIN WITH RADICULOPATHY AFFECTING RIGHT LOWER EXTREMITY: ICD-10-CM

## 2024-07-09 RX ORDER — IBUPROFEN 800 MG/1
TABLET, FILM COATED ORAL
Qty: 90 TABLET | Refills: 11 | Status: SHIPPED | OUTPATIENT
Start: 2024-07-09

## 2024-07-24 ENCOUNTER — TRANSFERRED RECORDS (OUTPATIENT)
Dept: HEALTH INFORMATION MANAGEMENT | Facility: CLINIC | Age: 54
End: 2024-07-24
Payer: COMMERCIAL

## 2024-08-09 DIAGNOSIS — K21.00 GASTROESOPHAGEAL REFLUX DISEASE WITH ESOPHAGITIS WITHOUT HEMORRHAGE: ICD-10-CM

## 2024-08-09 RX ORDER — OMEPRAZOLE 40 MG/1
40 CAPSULE, DELAYED RELEASE ORAL DAILY
Qty: 90 CAPSULE | Refills: 2 | Status: SHIPPED | OUTPATIENT
Start: 2024-08-09

## 2024-08-10 ENCOUNTER — HEALTH MAINTENANCE LETTER (OUTPATIENT)
Age: 54
End: 2024-08-10

## 2024-08-13 ENCOUNTER — OFFICE VISIT (OUTPATIENT)
Dept: FAMILY MEDICINE | Facility: CLINIC | Age: 54
End: 2024-08-13
Payer: COMMERCIAL

## 2024-08-13 VITALS
RESPIRATION RATE: 18 BRPM | HEART RATE: 107 BPM | DIASTOLIC BLOOD PRESSURE: 80 MMHG | SYSTOLIC BLOOD PRESSURE: 124 MMHG | BODY MASS INDEX: 45.1 KG/M2 | TEMPERATURE: 98.6 F | WEIGHT: 315 LBS | OXYGEN SATURATION: 95 % | HEIGHT: 70 IN

## 2024-08-13 DIAGNOSIS — Z79.899 ENCOUNTER FOR LONG-TERM (CURRENT) USE OF MEDICATIONS: ICD-10-CM

## 2024-08-13 DIAGNOSIS — I10 ESSENTIAL HYPERTENSION WITH GOAL BLOOD PRESSURE LESS THAN 140/90: ICD-10-CM

## 2024-08-13 DIAGNOSIS — Z87.891 PERSONAL HISTORY OF TOBACCO USE: ICD-10-CM

## 2024-08-13 DIAGNOSIS — E55.9 VITAMIN D DEFICIENCY: ICD-10-CM

## 2024-08-13 DIAGNOSIS — Z00.00 ROUTINE GENERAL MEDICAL EXAMINATION AT A HEALTH CARE FACILITY: Primary | ICD-10-CM

## 2024-08-13 DIAGNOSIS — E78.5 HYPERLIPIDEMIA WITH TARGET LDL LESS THAN 130: ICD-10-CM

## 2024-08-13 DIAGNOSIS — I10 HYPERTENSION GOAL BP (BLOOD PRESSURE) < 140/90: ICD-10-CM

## 2024-08-13 DIAGNOSIS — E66.01 SEVERE OBESITY (BMI >= 40) (H): ICD-10-CM

## 2024-08-13 DIAGNOSIS — M54.16 LUMBAR BACK PAIN WITH RADICULOPATHY AFFECTING RIGHT LOWER EXTREMITY: ICD-10-CM

## 2024-08-13 DIAGNOSIS — Z12.5 SCREENING FOR PROSTATE CANCER: ICD-10-CM

## 2024-08-13 PROCEDURE — 80061 LIPID PANEL: CPT | Performed by: FAMILY MEDICINE

## 2024-08-13 PROCEDURE — 80053 COMPREHEN METABOLIC PANEL: CPT | Performed by: FAMILY MEDICINE

## 2024-08-13 PROCEDURE — 36415 COLL VENOUS BLD VENIPUNCTURE: CPT | Performed by: FAMILY MEDICINE

## 2024-08-13 PROCEDURE — 82306 VITAMIN D 25 HYDROXY: CPT | Performed by: FAMILY MEDICINE

## 2024-08-13 PROCEDURE — G0103 PSA SCREENING: HCPCS | Performed by: FAMILY MEDICINE

## 2024-08-13 PROCEDURE — 99396 PREV VISIT EST AGE 40-64: CPT | Performed by: FAMILY MEDICINE

## 2024-08-13 RX ORDER — ATORVASTATIN CALCIUM 20 MG/1
20 TABLET, FILM COATED ORAL DAILY
Qty: 90 TABLET | Refills: 3 | Status: SHIPPED | OUTPATIENT
Start: 2024-08-13

## 2024-08-13 RX ORDER — HYDROCODONE BITARTRATE AND ACETAMINOPHEN 5; 325 MG/1; MG/1
1 TABLET ORAL 3 TIMES DAILY
COMMUNITY
Start: 2024-08-13

## 2024-08-13 RX ORDER — CARVEDILOL 25 MG/1
25 TABLET ORAL 2 TIMES DAILY WITH MEALS
Qty: 180 TABLET | Refills: 3 | Status: SHIPPED | OUTPATIENT
Start: 2024-08-13
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
## 2024-08-13 NOTE — PATIENT INSTRUCTIONS
Patient Education   Preventive Care Advice   This is general advice given by our system to help you stay healthy. However, your care team may have specific advice just for you. Please talk to your care team about your preventive care needs.  Nutrition  Eat 5 or more servings of fruits and vegetables each day.  Try wheat bread, brown rice and whole grain pasta (instead of white bread, rice, and pasta).  Get enough calcium and vitamin D. Check the label on foods and aim for 100% of the RDA (recommended daily allowance).  Lifestyle  Exercise at least 150 minutes each week  (30 minutes a day, 5 days a week).  Do muscle strengthening activities 2 days a week. These help control your weight and prevent disease.  No smoking.  Wear sunscreen to prevent skin cancer.  Have a dental exam and cleaning every 6 months.  Yearly exams  See your health care team every year to talk about:  Any changes in your health.  Any medicines your care team has prescribed.  Preventive care, family planning, and ways to prevent chronic diseases.  Shots (vaccines)   HPV shots (up to age 26), if you've never had them before.  Hepatitis B shots (up to age 59), if you've never had them before.  COVID-19 shot: Get this shot when it's due.  Flu shot: Get a flu shot every year.  Tetanus shot: Get a tetanus shot every 10 years.  Pneumococcal, hepatitis A, and RSV shots: Ask your care team if you need these based on your risk.  Shingles shot (for age 50 and up)  General health tests  Diabetes screening:  Starting at age 35, Get screened for diabetes at least every 3 years.  If you are younger than age 35, ask your care team if you should be screened for diabetes.  Cholesterol test: At age 39, start having a cholesterol test every 5 years, or more often if advised.  Bone density scan (DEXA): At age 50, ask your care team if you should have this scan for osteoporosis (brittle bones).  Hepatitis C: Get tested at least once in your life.  STIs (sexually  transmitted infections)  Before age 24: Ask your care team if you should be screened for STIs.  After age 24: Get screened for STIs if you're at risk. You are at risk for STIs (including HIV) if:  You are sexually active with more than one person.  You don't use condoms every time.  You or a partner was diagnosed with a sexually transmitted infection.  If you are at risk for HIV, ask about PrEP medicine to prevent HIV.  Get tested for HIV at least once in your life, whether you are at risk for HIV or not.  Cancer screening tests  Cervical cancer screening: If you have a cervix, begin getting regular cervical cancer screening tests starting at age 21.  Breast cancer scan (mammogram): If you've ever had breasts, begin having regular mammograms starting at age 40. This is a scan to check for breast cancer.  Colon cancer screening: It is important to start screening for colon cancer at age 45.  Have a colonoscopy test every 10 years (or more often if you're at risk) Or, ask your provider about stool tests like a FIT test every year or Cologuard test every 3 years.  To learn more about your testing options, visit:   .  For help making a decision, visit:   https://bit.ly/cy07685.  Prostate cancer screening test: If you have a prostate, ask your care team if a prostate cancer screening test (PSA) at age 55 is right for you.  Lung cancer screening: If you are a current or former smoker ages 50 to 80, ask your care team if ongoing lung cancer screenings are right for you.  For informational purposes only. Not to replace the advice of your health care provider. Copyright   2023 [facility]. All rights reserved. Clinically reviewed by the [facility]. P[employer]ce 035436 - REV 01/24.  Learning About Being Physically Active  What is physical activity?     Being physically active means doing any kind of activity that gets your body moving.  The types of physical activity that can help you get  "fit and stay healthy include:  Aerobic or \"cardio\" activities. These make your heart beat faster and make you breathe harder, such as brisk walking, riding a bike, or running. They strengthen your heart and lungs and build up your endurance.  Strength training activities. These make your muscles work against, or \"resist,\" something. Examples include lifting weights or doing push-ups. These activities help tone and strengthen your muscles and bones.  Stretches. These let you move your joints and muscles through their full range of motion. Stretching helps you be more flexible.  Reaching a balance between these three types of physical activity is important because each one contributes to your overall fitness.  What are the benefits of being active?  Being active is one of the best things you can do for your health. It helps you to:  Feel stronger and have more energy to do all the things you like to do.  Focus better at school or work.  Feel, think, and sleep better.  Reach and stay at a healthy weight.  Lose fat and build lean muscle.  Lower your risk for serious health problems, including diabetes, heart attack, high blood pressure, and some cancers.  Keep your heart, lungs, bones, muscles, and joints strong and healthy.  How can you make being active part of your life?  Start slowly. Make it your long-term goal to get at least 30 minutes of exercise on most days of the week. Walking is a good choice. You also may want to do other activities, such as running, swimming, cycling, or playing tennis or team sports.  Pick activities that you like--ones that make your heart beat faster, your muscles stronger, and your muscles and joints more flexible. If you find more than one thing you like doing, do them all. You don't have to do the same thing every day.  Get your heart pumping every day. Any activity that makes your heart beat faster and keeps it at that rate for a while counts.  Here are some great ways to get your " "heart beating faster:  Go for a brisk walk, run, or hike.  Go for a swim or bike ride.  Take an online exercise class or dance.  Play a game of touch football, basketball, or soccer.  Play tennis, pickleball, or racquetball.  Climb stairs.  Even some household chores can be aerobic. Just do them at a faster pace. Raking or mowing the lawn, sweeping the garage, and vacuuming and cleaning your home all can help get your heart rate up.  Strengthen your muscles during the week. You don't have to lift heavy weights or grow big, bulky muscles to get stronger. Doing a few simple activities that make your muscles work against, or \"resist,\" something can help you get stronger. Aim for at least twice a week.  For example, you can:  Do push-ups or sit-ups, which use your own body weight as resistance.  Lift weights or dumbbells or use stretch bands at home or in a gym or community center.  Stretch your muscles often. Stretching will help you as you become more active. It can help you stay flexible and loosen tight muscles. It can also help improve your balance and posture and can be a great way to relax.  Be sure to stretch the muscles you'll be using when you work out. It's best to warm your muscles slightly before you stretch them. Walk or do some other light aerobic activity for a few minutes. Then start stretching.  When you stretch your muscles:  Do it slowly. Stretching is not about going fast or making sudden movements.  Don't push or bounce during a stretch.  Hold each stretch for at least 15 to 30 seconds, if you can. You should feel a stretch in the muscle, but not pain.  Breathe out as you do the stretch. Then breathe in as you hold the stretch. Don't hold your breath.  If you're worried about how more activity might affect your health, have a checkup before you start. Follow any special advice your doctor gives you for getting a smart start.  Where can you learn more?  Go to " "https://www.[employer].net/patiented  Enter W332 in the search box to learn more about \"Learning About Being Physically Active.\"  Current as of: June 5, 2023  Content Version: 14.1 2006-2024 [employer].   Care instructions adapted under license by your healthcare professional. If you have questions about a medical condition or this instruction, always ask your healthcare professional. [employer] disclaims any warranty or liability for your use of this information.    Eating Healthy Foods: Care Instructions  With every meal, you can make healthy food choices. Try to eat a variety of fruits, vegetables, whole grains, lean proteins, and low-fat dairy products. This can help you get the right balance of nutrients, including vitamins and minerals. Small changes add up over time. You can start by adding one healthy food to your meals each day.    Try to make half your plate fruits and vegetables, one-fourth whole grains, and one-fourth lean proteins. Try including dairy with your meals.   Eat more fruits and vegetables. Try to have them with most meals and snacks.   Foods for healthy eating    Fruits    These can be fresh, frozen, canned, or dried.  Try to choose whole fruit rather than fruit juice.  Eat a variety of colors.    Vegetables    These can be fresh, frozen, canned, or dried.  Beans, peas, and lentils count too.    Whole grains    Choose whole-grain breads, cereals, and noodles.  Try brown rice.    Lean proteins    These can include lean meat, poultry, fish, and eggs.  You can also have tofu, beans, peas, lentils, nuts, and seeds.    Dairy    Try milk, yogurt, and cheese.  Choose low-fat or fat-free when you can.  If you need to, use lactose-free milk or fortified plant-based milk products, such as soy milk.    Water    Drink water when you're thirsty.  Limit sugar-sweetened drinks, including soda, fruit drinks, and sports drinks.  Where can you learn more?  Go to " "https://www.[employer].net/patiented  Enter T756 in the search box to learn more about \"Eating Healthy Foods: Care Instructions.\"  Current as of: September 20, 2023               Content Version: 14.0    0672-0589 [employer].   Care instructions adapted under license by your healthcare professional. If you have questions about a medical condition or this instruction, always ask your healthcare professional. [employer] disclaims any warranty or liability for your use of this information.      9 Ways to Cut Back on Drinking  Maybe you've found yourself drinking more alcohol than you'd prefer. If you want to cut back, here are some ideas to try.    Think before you drink.  Do you really want a drink, or is it just a habit? If you're used to having a drink at a certain time, try doing something else then.     Look for substitutes.  Find some no-alcohol drinks that you enjoy, like flavored seltzer water, tea with honey, or tonic with a slice of lime. Or try alcohol-free beer or \"virgin\" cocktails (without the alcohol).     Drink more water.  Use water to quench your thirst. Drink a glass of water before you have any alcohol. Have another glass along with every drink or between drinks.     Shrink your drink.  For example, have a bottle of beer instead of a pint. Use a smaller glass for wine. Choose drinks with lower alcohol content (ABV%). Or use less liquor and more mixer in cocktails.     Slow down.  It's easy to drink quickly and without thinking about it. Pay attention, and make each drink last longer.     Do the math.  Total up how much you spend on alcohol each month. How much is that a year? If you cut back, what could you do with the money you save?     Take a break.  Choose a day or two each week when you won't drink at all. Notice how you feel on those days, physically and emotionally. How did you sleep? Do you feel better? Over time, add more break days.     Count calories.  Would " "you like to lose some weight? For some people that's a good motivator for cutting back. Figure out how many calories are in each drink. How many does that add up to in a day? In a week? In a month?     Practice saying no.  Be ready when someone offers you a drink. Try: \"Thanks, I've had enough.\" Or \"Thanks, but I'm cutting back.\" Or \"No, thanks. I feel better when I drink less.\"   Current as of: November 15, 2023  Content Version: 14.1    [id number] [employer].   Care instructions adapted under license by your healthcare professional. If you have questions about a medical condition or this instruction, always ask your healthcare professional. [employer] disclaims any warranty or liability for your use of this information.     Lung Cancer Screening   Frequently Asked Questions  If you are at high-risk for lung cancer, getting screened with low-dose computed tomography (LDCT) every year can help save your life. This handout offers answers to some of the most common questions about lung cancer screening. If you have other questions, please call [phone] ([phone]).     What is it?  Lung cancer screening uses special X-ray technology to create an image of your lung tissue. The exam is quick and easy and takes less than 10 seconds. We don t give you any medicine or use any needles. You can eat before and after the exam. You don t need to change your clothes as long as the clothing on your chest doesn t contain metal. But, you do need to be able to hold your breath for at least 6 seconds during the exam.    What is the goal of lung cancer screening?  The goal of lung cancer screening is to save lives. Many times, lung cancer is not found until a person starts having physical symptoms. Lung cancer screening can help detect lung cancer in the earliest stages when it may be easier to treat.    Who should be screened for lung cancer?  We suggest lung cancer screening for anyone who is " at high-risk for lung cancer. You are in the high-risk group if you:      are between the ages of 55 and 79, and    have smoked at least 1 pack of cigarettes a day for 20 or more years, and    still smoke or have quit within the past 15 years.    However, if you have a new cough or shortness of breath, you should talk to your doctor before being screened.    Why does it matter if I have symptoms?  Certain symptoms can be a sign that you have a condition in your lungs that should be checked and treated by your doctor. These symptoms include fever, chest pain, a new or changing cough, shortness of breath that you have never felt before, coughing up blood or unexplained weight loss. Having any of these symptoms can greatly affect the results of lung cancer screening.       Should all smokers get an LDCT lung cancer screening exam?  It depends. Lung cancer screening is for a very specific group of men and women who have a history of heavy smoking over a long period of time (see  Who should be screened for lung cancer  above).  I am in the high-risk group, but have been diagnosed with cancer in the past. Is LDCT lung cancer screening right for me?  In some cases, you should not have LDCT lung screening, such as when your doctor is already following your cancer with CT scan studies. Your doctor will help you decide if LDCT lung screening is right for you.  Do I need to have a screening exam every year?  Yes. If you are in the high-risk group described earlier, you should get an LDCT lung cancer screening exam every year until you are 79, or are no longer willing or able to undergo screening and possible procedures to diagnose and treat lung cancer.  How effective is LDCT at preventing death from lung cancer?  Studies have shown that LDCT lung cancer screening can lower the risk of death from lung cancer by 20 percent in people who are at high-risk.  What are the risks?  There are some risks and limitations of LDCT lung  cancer screening. We want to make sure you understand the risks and benefits, so please let us know if you have any questions. Your doctor may want to talk with you more about these risks.    Radiation exposure: As with any exam that uses radiation, there is a very small increased risk of cancer. The amount of radiation in LDCT is small--about the same amount a person would get from a mammogram. Your doctor orders the exam when he or she feels the potential benefits outweigh the risks.    False negatives: No test is perfect, including LDCT. It is possible that you may have a medical condition, including lung cancer, that is not found during your exam. This is called a false negative result.    False positives and more testing: LDCT very often finds something in the lung that could be cancer, but in fact is not. This is called a false positive result. False positive tests often cause anxiety. To make sure these findings are not cancer, you may need to have more tests. These tests will be done only if you give us permission. Sometimes patients need a treatment that can have side effects, such as a biopsy. For more information on false positives, see  What can I expect from the results?     Findings not related to lung cancer: Your LDCT exam also takes pictures of areas of your body next to your lungs. In a very small number of cases, the CT scan will show an abnormal finding in one of these areas, such as your kidneys, adrenal glands, liver or thyroid. This finding may not be serious, but you may need more tests. Your doctor can help you decide what other tests you may need, if any.  What can I expect from the results?  About 1 out of 4 LDCT exams will find something that may need more tests. Most of the time, these findings are lung nodules. Lung nodules are very small collections of tissue in the lung. These nodules are very common, and the vast majority--more than 97 percent--are not cancer (benign). Most are  normal lymph nodes or small areas of scarring from past infections.  But, if a small lung nodule is found to be cancer, the cancer can be cured more than 90 percent of the time. To know if the nodule is cancer, we may need to get more images before your next yearly screening exam. If the nodule has suspicious features (for example, it is large, has an odd shape or grows over time), we will refer you to a specialist for further testing.  Will my doctor also get the results?  Yes. Your doctor will get a copy of your results.  Is it okay to keep smoking now that there s a cancer screening exam?  No. Tobacco is one of the strongest cancer-causing agents. It causes not only lung cancer, but other cancers and cardiovascular (heart) diseases as well. The damage caused by smoking builds over time. This means that the longer you smoke, the higher your risk of disease. While it is never too late to quit, the sooner you quit, the better.  Where can I find help to quit smoking?  The best way to prevent lung cancer is to stop smoking. If you have already quit smoking, congratulations and keep it up! For help on quitting smoking, please call [employer] at 7-315-QUITNOW (1-593.737.3878) or the American [employer] Society at 1-720.221.7745 to find local resources near you.  One-on-one health coaching:  If you d prefer to work individually with a health care provider on tobacco cessation, we offer:      Medication Therapy Management:  Our specially trained pharmacists work closely with you and your doctor to help you quit smoking.  Call [phone] or [phone] (toll free).

## 2024-08-14 LAB
ALBUMIN SERPL BCG-MCNC: 4 G/DL (ref 3.5–5.2)
ALP SERPL-CCNC: 81 U/L (ref 40–150)
ALT SERPL W P-5'-P-CCNC: 68 U/L (ref 0–70)
ANION GAP SERPL CALCULATED.3IONS-SCNC: 15 MMOL/L (ref 7–15)
AST SERPL W P-5'-P-CCNC: 97 U/L (ref 0–45)
BILIRUB SERPL-MCNC: 1.2 MG/DL
BUN SERPL-MCNC: 8.6 MG/DL (ref 6–20)
CALCIUM SERPL-MCNC: 9.1 MG/DL (ref 8.8–10.4)
CHLORIDE SERPL-SCNC: 88 MMOL/L (ref 98–107)
CHOLEST SERPL-MCNC: 193 MG/DL
CREAT SERPL-MCNC: 0.72 MG/DL (ref 0.67–1.17)
EGFRCR SERPLBLD CKD-EPI 2021: >90 ML/MIN/1.73M2
FASTING STATUS PATIENT QL REPORTED: YES
FASTING STATUS PATIENT QL REPORTED: YES
GLUCOSE SERPL-MCNC: 126 MG/DL (ref 70–99)
HCO3 SERPL-SCNC: 29 MMOL/L (ref 22–29)
HDLC SERPL-MCNC: 40 MG/DL
LDLC SERPL CALC-MCNC: 128 MG/DL
NONHDLC SERPL-MCNC: 153 MG/DL
POTASSIUM SERPL-SCNC: 3.4 MMOL/L (ref 3.4–5.3)
PROT SERPL-MCNC: 7.7 G/DL (ref 6.4–8.3)
PSA SERPL DL<=0.01 NG/ML-MCNC: 0.42 NG/ML (ref 0–3.5)
SODIUM SERPL-SCNC: 132 MMOL/L (ref 135–145)
TRIGL SERPL-MCNC: 125 MG/DL
VIT D+METAB SERPL-MCNC: 27 NG/ML (ref 20–50)

## 2024-08-15 DIAGNOSIS — E87.1 HYPONATREMIA: ICD-10-CM

## 2024-08-15 DIAGNOSIS — R73.09 ELEVATED GLUCOSE: Primary | ICD-10-CM

## 2024-08-22 ENCOUNTER — TRANSFERRED RECORDS (OUTPATIENT)
Dept: HEALTH INFORMATION MANAGEMENT | Facility: CLINIC | Age: 54
End: 2024-08-22
Payer: COMMERCIAL

## 2024-09-23 ENCOUNTER — TRANSFERRED RECORDS (OUTPATIENT)
Dept: HEALTH INFORMATION MANAGEMENT | Facility: CLINIC | Age: 54
End: 2024-09-23
Payer: COMMERCIAL

## 2024-10-24 ENCOUNTER — TRANSFERRED RECORDS (OUTPATIENT)
Dept: HEALTH INFORMATION MANAGEMENT | Facility: CLINIC | Age: 54
End: 2024-10-24
Payer: COMMERCIAL

## 2024-11-22 ENCOUNTER — TRANSFERRED RECORDS (OUTPATIENT)
Dept: HEALTH INFORMATION MANAGEMENT | Facility: CLINIC | Age: 54
End: 2024-11-22
Payer: COMMERCIAL

## 2024-12-23 ENCOUNTER — PATIENT OUTREACH (OUTPATIENT)
Dept: GASTROENTEROLOGY | Facility: CLINIC | Age: 54
End: 2024-12-23
Payer: COMMERCIAL

## 2024-12-23 DIAGNOSIS — Z12.11 SPECIAL SCREENING FOR MALIGNANT NEOPLASMS, COLON: Primary | ICD-10-CM

## 2024-12-23 NOTE — PROGRESS NOTES
"[facility] Screening Colonoscopy Referral Review    Patient meets the inclusion criteria for screening colonoscopy standing order.    Ordering/Referring Provider:  Carrington Rowe      BMI: Estimated body mass index is 51.65 kg/m  as calculated from the following:    Height as of 8/13/24: 1.778 m (5' 10\").    Weight as of 8/13/24: 163.3 kg (360 lb).     Sedation:  Does patient have any of the following conditions affecting sedation?  No medical conditions affecting sedation.    Previous Scopes:  Any previous recommendations or follow up needs based on previous scope?  na / No recommendations.    Medical Concerns to Postpone Order:  Does patient have any of the following medical concerns that should postpone/delay colonoscopy referral?  No medical conditions affecting colonoscopy referral.    Final Referral Details:  Based on patient's medical history patient is appropriate for referral order with moderate sedation. If patient's BMI > 50 do not schedule in [facility].  "

## 2025-01-20 ENCOUNTER — TRANSFERRED RECORDS (OUTPATIENT)
Dept: HEALTH INFORMATION MANAGEMENT | Facility: CLINIC | Age: 55
End: 2025-01-20
Payer: COMMERCIAL
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
## 2025-02-24 ENCOUNTER — TRANSFERRED RECORDS (OUTPATIENT)
Dept: HEALTH INFORMATION MANAGEMENT | Facility: CLINIC | Age: 55
End: 2025-02-24
Payer: COMMERCIAL

## 2025-03-25 ENCOUNTER — TRANSFERRED RECORDS (OUTPATIENT)
Dept: HEALTH INFORMATION MANAGEMENT | Facility: CLINIC | Age: 55
End: 2025-03-25
Payer: COMMERCIAL

## 2025-04-24 ENCOUNTER — TRANSFERRED RECORDS (OUTPATIENT)
Dept: HEALTH INFORMATION MANAGEMENT | Facility: CLINIC | Age: 55
End: 2025-04-24
Payer: COMMERCIAL

## 2025-05-06 DIAGNOSIS — I10 ESSENTIAL HYPERTENSION WITH GOAL BLOOD PRESSURE LESS THAN 140/90: ICD-10-CM

## 2025-05-06 RX ORDER — HYDROCHLOROTHIAZIDE 25 MG/1
25 TABLET ORAL DAILY
Qty: 90 TABLET | Refills: 0 | Status: SHIPPED | OUTPATIENT
Start: 2025-05-06

## 2025-05-22 ENCOUNTER — APPOINTMENT (OUTPATIENT)
Dept: ULTRASOUND IMAGING | Facility: CLINIC | Age: 55
End: 2025-05-22
Attending: STUDENT IN AN ORGANIZED HEALTH CARE EDUCATION/TRAINING PROGRAM
Payer: COMMERCIAL

## 2025-05-22 ENCOUNTER — APPOINTMENT (OUTPATIENT)
Dept: CT IMAGING | Facility: CLINIC | Age: 55
End: 2025-05-22
Attending: STUDENT IN AN ORGANIZED HEALTH CARE EDUCATION/TRAINING PROGRAM
Payer: COMMERCIAL

## 2025-05-22 ENCOUNTER — HOSPITAL ENCOUNTER (EMERGENCY)
Facility: CLINIC | Age: 55
End: 2025-05-22
Attending: STUDENT IN AN ORGANIZED HEALTH CARE EDUCATION/TRAINING PROGRAM
Payer: COMMERCIAL

## 2025-05-22 VITALS
TEMPERATURE: 97.4 F | RESPIRATION RATE: 18 BRPM | DIASTOLIC BLOOD PRESSURE: 72 MMHG | SYSTOLIC BLOOD PRESSURE: 115 MMHG | OXYGEN SATURATION: 95 % | HEART RATE: 89 BPM

## 2025-05-22 DIAGNOSIS — E83.42 HYPOMAGNESEMIA: ICD-10-CM

## 2025-05-22 DIAGNOSIS — G47.09 OTHER INSOMNIA: ICD-10-CM

## 2025-05-22 DIAGNOSIS — K21.00 GASTROESOPHAGEAL REFLUX DISEASE WITH ESOPHAGITIS WITHOUT HEMORRHAGE: ICD-10-CM

## 2025-05-22 DIAGNOSIS — K72.00 ACUTE LIVER FAILURE WITHOUT HEPATIC COMA: Primary | ICD-10-CM

## 2025-05-22 DIAGNOSIS — E66.01 SEVERE OBESITY (BMI >= 40) (H): ICD-10-CM

## 2025-05-22 DIAGNOSIS — N28.1 RENAL CYST: ICD-10-CM

## 2025-05-22 DIAGNOSIS — K70.10 ALCOHOLIC HEPATITIS, UNSPECIFIED WHETHER ASCITES PRESENT (H): ICD-10-CM

## 2025-05-22 DIAGNOSIS — E87.6 HYPOKALEMIA: ICD-10-CM

## 2025-05-22 DIAGNOSIS — K70.30 ALCOHOLIC CIRRHOSIS, UNSPECIFIED WHETHER ASCITES PRESENT (H): ICD-10-CM

## 2025-05-22 DIAGNOSIS — F10.10 ALCOHOL ABUSE: ICD-10-CM

## 2025-05-22 DIAGNOSIS — M54.16 LUMBAR BACK PAIN WITH RADICULOPATHY AFFECTING LOWER EXTREMITY: ICD-10-CM

## 2025-05-22 DIAGNOSIS — R53.1 GENERALIZED WEAKNESS: ICD-10-CM

## 2025-05-22 DIAGNOSIS — I10 ESSENTIAL HYPERTENSION WITH GOAL BLOOD PRESSURE LESS THAN 140/90: ICD-10-CM

## 2025-05-22 LAB
ALBUMIN SERPL BCG-MCNC: 3 G/DL (ref 3.5–5.2)
ALBUMIN UR-MCNC: 50 MG/DL
ALP SERPL-CCNC: 286 U/L (ref 40–150)
ALT SERPL W P-5'-P-CCNC: 62 U/L (ref 0–70)
ANION GAP SERPL CALCULATED.3IONS-SCNC: 16 MMOL/L (ref 7–15)
APPEARANCE UR: CLEAR
AST SERPL W P-5'-P-CCNC: 210 U/L (ref 0–45)
ATRIAL RATE - MUSE: 86 BPM
B-OH-BUTYR SERPL-SCNC: 0.35 MMOL/L
BASOPHILS # BLD AUTO: 0.1 10E3/UL (ref 0–0.2)
BASOPHILS NFR BLD AUTO: 1 %
BILIRUB DIRECT SERPL-MCNC: 2.49 MG/DL (ref 0–0.3)
BILIRUB SERPL-MCNC: 3.8 MG/DL
BILIRUB UR QL STRIP: ABNORMAL
BUN SERPL-MCNC: 7.9 MG/DL (ref 6–20)
CALCIUM SERPL-MCNC: 7.8 MG/DL (ref 8.8–10.4)
CHLORIDE SERPL-SCNC: 104 MMOL/L (ref 98–107)
CK SERPL-CCNC: 296 U/L (ref 39–308)
COLOR UR AUTO: ABNORMAL
CREAT SERPL-MCNC: 1.25 MG/DL (ref 0.67–1.17)
DIASTOLIC BLOOD PRESSURE - MUSE: NORMAL MMHG
EGFRCR SERPLBLD CKD-EPI 2021: 68 ML/MIN/1.73M2
EOSINOPHIL # BLD AUTO: 0 10E3/UL (ref 0–0.7)
EOSINOPHIL NFR BLD AUTO: 1 %
ERYTHROCYTE [DISTWIDTH] IN BLOOD BY AUTOMATED COUNT: 17.6 % (ref 10–15)
ETHANOL SERPL-MCNC: 0.18 G/DL
GLUCOSE SERPL-MCNC: 121 MG/DL (ref 70–99)
GLUCOSE UR STRIP-MCNC: NEGATIVE MG/DL
HCO3 SERPL-SCNC: 21 MMOL/L (ref 22–29)
HCT VFR BLD AUTO: 34.1 % (ref 40–53)
HGB BLD-MCNC: 11.8 G/DL (ref 13.3–17.7)
HGB UR QL STRIP: NEGATIVE
HYALINE CASTS: 18 /LPF
IMM GRANULOCYTES # BLD: 0 10E3/UL
IMM GRANULOCYTES NFR BLD: 0 %
INR PPP: 1.56 (ref 0.85–1.15)
INTERPRETATION ECG - MUSE: NORMAL
KETONES UR STRIP-MCNC: NEGATIVE MG/DL
LEUKOCYTE ESTERASE UR QL STRIP: NEGATIVE
LYMPHOCYTES # BLD AUTO: 0.7 10E3/UL (ref 0.8–5.3)
LYMPHOCYTES NFR BLD AUTO: 11 %
MAGNESIUM SERPL-MCNC: 1.4 MG/DL (ref 1.7–2.3)
MCH RBC QN AUTO: 33.3 PG (ref 26.5–33)
MCHC RBC AUTO-ENTMCNC: 34.6 G/DL (ref 31.5–36.5)
MCV RBC AUTO: 96 FL (ref 78–100)
MONOCYTES # BLD AUTO: 0.5 10E3/UL (ref 0–1.3)
MONOCYTES NFR BLD AUTO: 7 %
MUCOUS THREADS #/AREA URNS LPF: PRESENT /LPF
NEUTROPHILS # BLD AUTO: 5.3 10E3/UL (ref 1.6–8.3)
NEUTROPHILS NFR BLD AUTO: 80 %
NITRATE UR QL: NEGATIVE
NRBC # BLD AUTO: 0 10E3/UL
NRBC BLD AUTO-RTO: 0 /100
P AXIS - MUSE: 48 DEGREES
PH UR STRIP: 6 [PH] (ref 5–7)
PLATELET # BLD AUTO: 72 10E3/UL (ref 150–450)
POTASSIUM SERPL-SCNC: 3.2 MMOL/L (ref 3.4–5.3)
PR INTERVAL - MUSE: 166 MS
PROT SERPL-MCNC: 7.2 G/DL (ref 6.4–8.3)
PROTHROMBIN TIME: 18.2 SECONDS (ref 11.8–14.8)
QRS DURATION - MUSE: 90 MS
QT - MUSE: 398 MS
QTC - MUSE: 476 MS
R AXIS - MUSE: 65 DEGREES
RBC # BLD AUTO: 3.54 10E6/UL (ref 4.4–5.9)
RBC URINE: 2 /HPF
SODIUM SERPL-SCNC: 141 MMOL/L (ref 135–145)
SP GR UR STRIP: 1.01 (ref 1–1.03)
SQUAMOUS EPITHELIAL: <1 /HPF
SYSTOLIC BLOOD PRESSURE - MUSE: NORMAL MMHG
T AXIS - MUSE: 34 DEGREES
UROBILINOGEN UR STRIP-MCNC: 4 MG/DL
VENTRICULAR RATE- MUSE: 86 BPM
WBC # BLD AUTO: 6.6 10E3/UL (ref 4–11)
WBC URINE: 3 /HPF

## 2025-05-22 PROCEDURE — 84100 ASSAY OF PHOSPHORUS: CPT | Performed by: STUDENT IN AN ORGANIZED HEALTH CARE EDUCATION/TRAINING PROGRAM

## 2025-05-22 PROCEDURE — 250N000011 HC RX IP 250 OP 636: Performed by: STUDENT IN AN ORGANIZED HEALTH CARE EDUCATION/TRAINING PROGRAM

## 2025-05-22 PROCEDURE — 82077 ASSAY SPEC XCP UR&BREATH IA: CPT | Performed by: STUDENT IN AN ORGANIZED HEALTH CARE EDUCATION/TRAINING PROGRAM

## 2025-05-22 PROCEDURE — 96361 HYDRATE IV INFUSION ADD-ON: CPT

## 2025-05-22 PROCEDURE — 81003 URINALYSIS AUTO W/O SCOPE: CPT | Performed by: STUDENT IN AN ORGANIZED HEALTH CARE EDUCATION/TRAINING PROGRAM

## 2025-05-22 PROCEDURE — 85610 PROTHROMBIN TIME: CPT | Performed by: STUDENT IN AN ORGANIZED HEALTH CARE EDUCATION/TRAINING PROGRAM

## 2025-05-22 PROCEDURE — 82728 ASSAY OF FERRITIN: CPT | Performed by: STUDENT IN AN ORGANIZED HEALTH CARE EDUCATION/TRAINING PROGRAM

## 2025-05-22 PROCEDURE — 80051 ELECTROLYTE PANEL: CPT | Performed by: STUDENT IN AN ORGANIZED HEALTH CARE EDUCATION/TRAINING PROGRAM

## 2025-05-22 PROCEDURE — 82010 KETONE BODYS QUAN: CPT | Performed by: STUDENT IN AN ORGANIZED HEALTH CARE EDUCATION/TRAINING PROGRAM

## 2025-05-22 PROCEDURE — HZ2ZZZZ DETOXIFICATION SERVICES FOR SUBSTANCE ABUSE TREATMENT: ICD-10-PCS | Performed by: INTERNAL MEDICINE

## 2025-05-22 PROCEDURE — 83735 ASSAY OF MAGNESIUM: CPT | Performed by: STUDENT IN AN ORGANIZED HEALTH CARE EDUCATION/TRAINING PROGRAM

## 2025-05-22 PROCEDURE — 36415 COLL VENOUS BLD VENIPUNCTURE: CPT | Performed by: STUDENT IN AN ORGANIZED HEALTH CARE EDUCATION/TRAINING PROGRAM

## 2025-05-22 PROCEDURE — 96375 TX/PRO/DX INJ NEW DRUG ADDON: CPT

## 2025-05-22 PROCEDURE — 250N000009 HC RX 250: Performed by: STUDENT IN AN ORGANIZED HEALTH CARE EDUCATION/TRAINING PROGRAM

## 2025-05-22 PROCEDURE — 85025 COMPLETE CBC W/AUTO DIFF WBC: CPT | Performed by: STUDENT IN AN ORGANIZED HEALTH CARE EDUCATION/TRAINING PROGRAM

## 2025-05-22 PROCEDURE — 82550 ASSAY OF CK (CPK): CPT | Performed by: STUDENT IN AN ORGANIZED HEALTH CARE EDUCATION/TRAINING PROGRAM

## 2025-05-22 PROCEDURE — 83540 ASSAY OF IRON: CPT | Performed by: STUDENT IN AN ORGANIZED HEALTH CARE EDUCATION/TRAINING PROGRAM

## 2025-05-22 PROCEDURE — 99285 EMERGENCY DEPT VISIT HI MDM: CPT | Mod: 25

## 2025-05-22 PROCEDURE — 81001 URINALYSIS AUTO W/SCOPE: CPT | Performed by: STUDENT IN AN ORGANIZED HEALTH CARE EDUCATION/TRAINING PROGRAM

## 2025-05-22 PROCEDURE — 74177 CT ABD & PELVIS W/CONTRAST: CPT

## 2025-05-22 PROCEDURE — 258N000003 HC RX IP 258 OP 636: Performed by: STUDENT IN AN ORGANIZED HEALTH CARE EDUCATION/TRAINING PROGRAM

## 2025-05-22 PROCEDURE — 96365 THER/PROPH/DIAG IV INF INIT: CPT | Mod: 59

## 2025-05-22 PROCEDURE — 80076 HEPATIC FUNCTION PANEL: CPT | Performed by: STUDENT IN AN ORGANIZED HEALTH CARE EDUCATION/TRAINING PROGRAM

## 2025-05-22 PROCEDURE — 76705 ECHO EXAM OF ABDOMEN: CPT

## 2025-05-22 PROCEDURE — 85018 HEMOGLOBIN: CPT | Performed by: STUDENT IN AN ORGANIZED HEALTH CARE EDUCATION/TRAINING PROGRAM

## 2025-05-22 PROCEDURE — 93005 ELECTROCARDIOGRAM TRACING: CPT

## 2025-05-22 PROCEDURE — 85045 AUTOMATED RETICULOCYTE COUNT: CPT | Performed by: STUDENT IN AN ORGANIZED HEALTH CARE EDUCATION/TRAINING PROGRAM

## 2025-05-22 RX ORDER — THERA TABS 400 MCG
1 TAB ORAL DAILY
COMMUNITY

## 2025-05-22 RX ORDER — MAGNESIUM SULFATE HEPTAHYDRATE 40 MG/ML
2 INJECTION, SOLUTION INTRAVENOUS ONCE
Status: COMPLETED | OUTPATIENT
Start: 2025-05-22 | End: 2025-05-22

## 2025-05-22 RX ORDER — ONDANSETRON 2 MG/ML
4 INJECTION INTRAMUSCULAR; INTRAVENOUS ONCE
Status: COMPLETED | OUTPATIENT
Start: 2025-05-22 | End: 2025-05-22

## 2025-05-22 RX ORDER — IOPAMIDOL 755 MG/ML
135 INJECTION, SOLUTION INTRAVASCULAR ONCE
Status: COMPLETED | OUTPATIENT
Start: 2025-05-22 | End: 2025-05-22

## 2025-05-22 RX ORDER — POTASSIUM CHLORIDE 7.45 MG/ML
10 INJECTION INTRAVENOUS ONCE
Status: COMPLETED | OUTPATIENT
Start: 2025-05-22 | End: 2025-05-23

## 2025-05-22 RX ADMIN — IOPAMIDOL 135 ML: 755 INJECTION, SOLUTION INTRAVENOUS at 22:06

## 2025-05-22 RX ADMIN — SODIUM CHLORIDE 1000 ML: 0.9 INJECTION, SOLUTION INTRAVENOUS at 20:44

## 2025-05-22 RX ADMIN — ONDANSETRON 4 MG: 2 INJECTION, SOLUTION INTRAMUSCULAR; INTRAVENOUS at 22:52

## 2025-05-22 RX ADMIN — SODIUM CHLORIDE 79 ML: 9 INJECTION, SOLUTION INTRAVENOUS at 22:06

## 2025-05-22 RX ADMIN — MAGNESIUM SULFATE HEPTAHYDRATE 2 G: 40 INJECTION, SOLUTION INTRAVENOUS at 22:25

## 2025-05-22 ASSESSMENT — ACTIVITIES OF DAILY LIVING (ADL)
ADLS_ACUITY_SCORE: 41

## 2025-05-22 ASSESSMENT — COLUMBIA-SUICIDE SEVERITY RATING SCALE - C-SSRS
6. HAVE YOU EVER DONE ANYTHING, STARTED TO DO ANYTHING, OR PREPARED TO DO ANYTHING TO END YOUR LIFE?: NO
2. HAVE YOU ACTUALLY HAD ANY THOUGHTS OF KILLING YOURSELF IN THE PAST MONTH?: NO
1. IN THE PAST MONTH, HAVE YOU WISHED YOU WERE DEAD OR WISHED YOU COULD GO TO SLEEP AND NOT WAKE UP?: NO

## 2025-05-22 NOTE — ED TRIAGE NOTES
Pt presents to triage with ex-wife; pt C/O weakness and some incontinence, started 2 days ago. Pt unable to control diarrhea, unable to eat. Nauseous without vomiting.      Triage Assessment (Adult)       Row Name 05/22/25 6498          Triage Assessment    Airway WDL WDL        Respiratory WDL    Respiratory WDL WDL        Skin Circulation/Temperature WDL    Skin Circulation/Temperature WDL WDL        Cardiac WDL    Cardiac WDL WDL        Peripheral/Neurovascular WDL    Peripheral Neurovascular WDL WDL        Cognitive/Neuro/Behavioral WDL    Cognitive/Neuro/Behavioral WDL WDL

## 2025-05-23 ENCOUNTER — APPOINTMENT (OUTPATIENT)
Dept: PHYSICAL THERAPY | Facility: CLINIC | Age: 55
End: 2025-05-23
Attending: STUDENT IN AN ORGANIZED HEALTH CARE EDUCATION/TRAINING PROGRAM
Payer: COMMERCIAL

## 2025-05-23 PROBLEM — E87.6 HYPOKALEMIA: Status: ACTIVE | Noted: 2025-05-23

## 2025-05-23 PROBLEM — K72.00 ACUTE LIVER FAILURE WITHOUT HEPATIC COMA: Status: ACTIVE | Noted: 2025-05-23

## 2025-05-23 PROBLEM — E83.42 HYPOMAGNESEMIA: Status: ACTIVE | Noted: 2025-05-23

## 2025-05-23 PROBLEM — R53.1 GENERALIZED WEAKNESS: Status: ACTIVE | Noted: 2025-05-23

## 2025-05-23 LAB
ADV 40+41 DNA STL QL NAA+NON-PROBE: NEGATIVE
ALBUMIN SERPL BCG-MCNC: 2.6 G/DL (ref 3.5–5.2)
ALP SERPL-CCNC: 261 U/L (ref 40–150)
ALT SERPL W P-5'-P-CCNC: 57 U/L (ref 0–70)
AMMONIA PLAS-SCNC: 101 UMOL/L (ref 16–60)
ANION GAP SERPL CALCULATED.3IONS-SCNC: 17 MMOL/L (ref 7–15)
AST SERPL W P-5'-P-CCNC: 188 U/L (ref 0–45)
ASTRO TYP 1-8 RNA STL QL NAA+NON-PROBE: NEGATIVE
BILIRUB SERPL-MCNC: 3.5 MG/DL
BUN SERPL-MCNC: 8 MG/DL (ref 6–20)
C CAYETANENSIS DNA STL QL NAA+NON-PROBE: NEGATIVE
CALCIUM SERPL-MCNC: 7.5 MG/DL (ref 8.8–10.4)
CAMPYLOBACTER DNA SPEC NAA+PROBE: NEGATIVE
CHLORIDE SERPL-SCNC: 105 MMOL/L (ref 98–107)
CREAT SERPL-MCNC: 1.12 MG/DL (ref 0.67–1.17)
CRYPTOSP DNA STL QL NAA+NON-PROBE: NEGATIVE
E COLI O157 DNA STL QL NAA+NON-PROBE: ABNORMAL
E HISTOLYT DNA STL QL NAA+NON-PROBE: NEGATIVE
EAEC ASTA GENE ISLT QL NAA+PROBE: NEGATIVE
EC STX1+STX2 GENES STL QL NAA+NON-PROBE: NEGATIVE
EGFRCR SERPLBLD CKD-EPI 2021: 78 ML/MIN/1.73M2
EPEC EAE GENE STL QL NAA+NON-PROBE: NEGATIVE
ERYTHROCYTE [DISTWIDTH] IN BLOOD BY AUTOMATED COUNT: 17.7 % (ref 10–15)
ETEC LTA+ST1A+ST1B TOX ST NAA+NON-PROBE: NEGATIVE
FERRITIN SERPL-MCNC: 2432 NG/ML (ref 31–409)
FLUAV RNA SPEC QL NAA+PROBE: NEGATIVE
FLUBV RNA RESP QL NAA+PROBE: NEGATIVE
G LAMBLIA DNA STL QL NAA+NON-PROBE: NEGATIVE
GLUCOSE SERPL-MCNC: 116 MG/DL (ref 70–99)
HCO3 SERPL-SCNC: 19 MMOL/L (ref 22–29)
HCT VFR BLD AUTO: 31.4 % (ref 40–53)
HGB BLD-MCNC: 10.5 G/DL (ref 13.3–17.7)
IRON BINDING CAPACITY (ROCHE): 156 UG/DL (ref 240–430)
IRON SATN MFR SERPL: 88 % (ref 15–46)
IRON SERPL-MCNC: 138 UG/DL (ref 61–157)
MAGNESIUM SERPL-MCNC: 1.5 MG/DL (ref 1.7–2.3)
MAGNESIUM SERPL-MCNC: 1.8 MG/DL (ref 1.7–2.3)
MCH RBC QN AUTO: 32.7 PG (ref 26.5–33)
MCHC RBC AUTO-ENTMCNC: 33.4 G/DL (ref 31.5–36.5)
MCV RBC AUTO: 98 FL (ref 78–100)
MCV RBC AUTO: 98 FL (ref 78–100)
NOROVIRUS GI+II RNA STL QL NAA+NON-PROBE: POSITIVE
P SHIGELLOIDES DNA STL QL NAA+NON-PROBE: NEGATIVE
PHOSPHATE SERPL-MCNC: 3.2 MG/DL (ref 2.5–4.5)
PLATELET # BLD AUTO: 64 10E3/UL (ref 150–450)
POTASSIUM SERPL-SCNC: 2.7 MMOL/L (ref 3.4–5.3)
POTASSIUM SERPL-SCNC: 3.2 MMOL/L (ref 3.4–5.3)
POTASSIUM SERPL-SCNC: 3.3 MMOL/L (ref 3.4–5.3)
PROT SERPL-MCNC: 6.6 G/DL (ref 6.4–8.3)
RBC # BLD AUTO: 3.21 10E6/UL (ref 4.4–5.9)
RETICS # AUTO: 0.08 10E6/UL (ref 0.03–0.1)
RETICS/RBC NFR AUTO: 2.2 % (ref 0.5–2)
RSV RNA SPEC NAA+PROBE: NEGATIVE
RVA RNA STL QL NAA+NON-PROBE: NEGATIVE
SALMONELLA SP RPOD STL QL NAA+PROBE: NEGATIVE
SAPO I+II+IV+V RNA STL QL NAA+NON-PROBE: NEGATIVE
SARS-COV-2 RNA RESP QL NAA+PROBE: NEGATIVE
SHIGELLA SP+EIEC IPAH ST NAA+NON-PROBE: NEGATIVE
SODIUM SERPL-SCNC: 141 MMOL/L (ref 135–145)
V CHOLERAE DNA SPEC QL NAA+PROBE: NEGATIVE
VIBRIO DNA SPEC NAA+PROBE: NEGATIVE
WBC # BLD AUTO: 6.7 10E3/UL (ref 4–11)
Y ENTEROCOL DNA STL QL NAA+PROBE: NEGATIVE

## 2025-05-23 PROCEDURE — 250N000012 HC RX MED GY IP 250 OP 636 PS 637: Performed by: STUDENT IN AN ORGANIZED HEALTH CARE EDUCATION/TRAINING PROGRAM

## 2025-05-23 PROCEDURE — 250N000013 HC RX MED GY IP 250 OP 250 PS 637: Performed by: STUDENT IN AN ORGANIZED HEALTH CARE EDUCATION/TRAINING PROGRAM

## 2025-05-23 PROCEDURE — 96375 TX/PRO/DX INJ NEW DRUG ADDON: CPT

## 2025-05-23 PROCEDURE — 97161 PT EVAL LOW COMPLEX 20 MIN: CPT | Mod: GP | Performed by: PHYSICAL THERAPIST

## 2025-05-23 PROCEDURE — 84132 ASSAY OF SERUM POTASSIUM: CPT | Performed by: HOSPITALIST

## 2025-05-23 PROCEDURE — 87507 IADNA-DNA/RNA PROBE TQ 12-25: CPT | Performed by: STUDENT IN AN ORGANIZED HEALTH CARE EDUCATION/TRAINING PROGRAM

## 2025-05-23 PROCEDURE — 36415 COLL VENOUS BLD VENIPUNCTURE: CPT | Performed by: PHYSICIAN ASSISTANT

## 2025-05-23 PROCEDURE — 99223 1ST HOSP IP/OBS HIGH 75: CPT | Mod: AI | Performed by: STUDENT IN AN ORGANIZED HEALTH CARE EDUCATION/TRAINING PROGRAM

## 2025-05-23 PROCEDURE — 84132 ASSAY OF SERUM POTASSIUM: CPT | Performed by: STUDENT IN AN ORGANIZED HEALTH CARE EDUCATION/TRAINING PROGRAM

## 2025-05-23 PROCEDURE — 250N000013 HC RX MED GY IP 250 OP 250 PS 637: Performed by: HOSPITALIST

## 2025-05-23 PROCEDURE — 36415 COLL VENOUS BLD VENIPUNCTURE: CPT | Performed by: HOSPITALIST

## 2025-05-23 PROCEDURE — 80053 COMPREHEN METABOLIC PANEL: CPT | Performed by: STUDENT IN AN ORGANIZED HEALTH CARE EDUCATION/TRAINING PROGRAM

## 2025-05-23 PROCEDURE — 36415 COLL VENOUS BLD VENIPUNCTURE: CPT | Performed by: STUDENT IN AN ORGANIZED HEALTH CARE EDUCATION/TRAINING PROGRAM

## 2025-05-23 PROCEDURE — 87637 SARSCOV2&INF A&B&RSV AMP PRB: CPT | Performed by: STUDENT IN AN ORGANIZED HEALTH CARE EDUCATION/TRAINING PROGRAM

## 2025-05-23 PROCEDURE — 97530 THERAPEUTIC ACTIVITIES: CPT | Mod: GP | Performed by: PHYSICAL THERAPIST

## 2025-05-23 PROCEDURE — 250N000011 HC RX IP 250 OP 636: Performed by: STUDENT IN AN ORGANIZED HEALTH CARE EDUCATION/TRAINING PROGRAM

## 2025-05-23 PROCEDURE — 83735 ASSAY OF MAGNESIUM: CPT | Performed by: STUDENT IN AN ORGANIZED HEALTH CARE EDUCATION/TRAINING PROGRAM

## 2025-05-23 PROCEDURE — 85027 COMPLETE CBC AUTOMATED: CPT | Performed by: STUDENT IN AN ORGANIZED HEALTH CARE EDUCATION/TRAINING PROGRAM

## 2025-05-23 PROCEDURE — 120N000001 HC R&B MED SURG/OB

## 2025-05-23 PROCEDURE — 82140 ASSAY OF AMMONIA: CPT | Performed by: PHYSICIAN ASSISTANT

## 2025-05-23 RX ORDER — MAGNESIUM SULFATE HEPTAHYDRATE 40 MG/ML
4 INJECTION, SOLUTION INTRAVENOUS ONCE
Status: COMPLETED | OUTPATIENT
Start: 2025-05-23 | End: 2025-05-23

## 2025-05-23 RX ORDER — PREDNISOLONE SODIUM PHOSPHATE 15 MG/5ML
40 SOLUTION ORAL DAILY
Status: DISCONTINUED | OUTPATIENT
Start: 2025-05-23 | End: 2025-06-01

## 2025-05-23 RX ORDER — HYDRALAZINE HYDROCHLORIDE 10 MG/1
10 TABLET, FILM COATED ORAL EVERY 4 HOURS PRN
Status: DISCONTINUED | OUTPATIENT
Start: 2025-05-23 | End: 2025-06-02 | Stop reason: HOSPADM

## 2025-05-23 RX ORDER — LORAZEPAM 1 MG/1
1-2 TABLET ORAL EVERY 30 MIN PRN
Status: DISCONTINUED | OUTPATIENT
Start: 2025-05-23 | End: 2025-06-02 | Stop reason: HOSPADM

## 2025-05-23 RX ORDER — GABAPENTIN 600 MG/1
1200 TABLET ORAL ONCE
Status: COMPLETED | OUTPATIENT
Start: 2025-05-23 | End: 2025-05-23

## 2025-05-23 RX ORDER — CALCIUM CARBONATE 500 MG/1
1000 TABLET, CHEWABLE ORAL 4 TIMES DAILY PRN
Status: DISCONTINUED | OUTPATIENT
Start: 2025-05-23 | End: 2025-06-02 | Stop reason: HOSPADM

## 2025-05-23 RX ORDER — THIAMINE HYDROCHLORIDE 100 MG/ML
500 INJECTION, SOLUTION INTRAMUSCULAR; INTRAVENOUS 3 TIMES DAILY
Status: COMPLETED | OUTPATIENT
Start: 2025-05-23 | End: 2025-05-24

## 2025-05-23 RX ORDER — PANTOPRAZOLE SODIUM 40 MG/1
40 TABLET, DELAYED RELEASE ORAL 2 TIMES DAILY
Status: DISCONTINUED | OUTPATIENT
Start: 2025-05-23 | End: 2025-06-02 | Stop reason: HOSPADM

## 2025-05-23 RX ORDER — NALOXONE HYDROCHLORIDE 0.4 MG/ML
0.4 INJECTION, SOLUTION INTRAMUSCULAR; INTRAVENOUS; SUBCUTANEOUS
Status: DISCONTINUED | OUTPATIENT
Start: 2025-05-23 | End: 2025-06-02 | Stop reason: HOSPADM

## 2025-05-23 RX ORDER — NALOXONE HYDROCHLORIDE 0.4 MG/ML
0.2 INJECTION, SOLUTION INTRAMUSCULAR; INTRAVENOUS; SUBCUTANEOUS
Status: DISCONTINUED | OUTPATIENT
Start: 2025-05-23 | End: 2025-06-02 | Stop reason: HOSPADM

## 2025-05-23 RX ORDER — THIAMINE HYDROCHLORIDE 100 MG/ML
250 INJECTION, SOLUTION INTRAMUSCULAR; INTRAVENOUS DAILY
Status: COMPLETED | OUTPATIENT
Start: 2025-05-25 | End: 2025-05-29

## 2025-05-23 RX ORDER — POTASSIUM CHLORIDE 1500 MG/1
20 TABLET, EXTENDED RELEASE ORAL ONCE
Status: COMPLETED | OUTPATIENT
Start: 2025-05-23 | End: 2025-05-23

## 2025-05-23 RX ORDER — FOLIC ACID 1 MG/1
1 TABLET ORAL DAILY
Status: DISCONTINUED | OUTPATIENT
Start: 2025-05-23 | End: 2025-06-02 | Stop reason: HOSPADM

## 2025-05-23 RX ORDER — MAGNESIUM OXIDE 400 MG/1
400 TABLET ORAL EVERY 4 HOURS
Status: COMPLETED | OUTPATIENT
Start: 2025-05-23 | End: 2025-05-23

## 2025-05-23 RX ORDER — PROCHLORPERAZINE MALEATE 10 MG
10 TABLET ORAL EVERY 6 HOURS PRN
Status: DISCONTINUED | OUTPATIENT
Start: 2025-05-23 | End: 2025-06-02 | Stop reason: HOSPADM

## 2025-05-23 RX ORDER — FUROSEMIDE 10 MG/ML
60 INJECTION INTRAMUSCULAR; INTRAVENOUS ONCE
Status: COMPLETED | OUTPATIENT
Start: 2025-05-23 | End: 2025-05-23

## 2025-05-23 RX ORDER — SPIRONOLACTONE 25 MG/1
25 TABLET ORAL DAILY
Status: DISCONTINUED | OUTPATIENT
Start: 2025-05-23 | End: 2025-06-02 | Stop reason: HOSPADM

## 2025-05-23 RX ORDER — GABAPENTIN 100 MG/1
100 CAPSULE ORAL EVERY 8 HOURS
Status: COMPLETED | OUTPATIENT
Start: 2025-05-30 | End: 2025-06-02

## 2025-05-23 RX ORDER — MULTIPLE VITAMINS W/ MINERALS TAB 9MG-400MCG
1 TAB ORAL DAILY
Status: DISCONTINUED | OUTPATIENT
Start: 2025-05-23 | End: 2025-05-25

## 2025-05-23 RX ORDER — POTASSIUM CHLORIDE 1500 MG/1
40 TABLET, EXTENDED RELEASE ORAL ONCE
Status: COMPLETED | OUTPATIENT
Start: 2025-05-23 | End: 2025-05-24

## 2025-05-23 RX ORDER — OLANZAPINE 5 MG/1
5-10 TABLET, ORALLY DISINTEGRATING ORAL EVERY 6 HOURS PRN
Status: DISCONTINUED | OUTPATIENT
Start: 2025-05-23 | End: 2025-06-02 | Stop reason: HOSPADM

## 2025-05-23 RX ORDER — GABAPENTIN 300 MG/1
900 CAPSULE ORAL EVERY 8 HOURS
Status: COMPLETED | OUTPATIENT
Start: 2025-05-23 | End: 2025-05-26

## 2025-05-23 RX ORDER — POTASSIUM CHLORIDE 1500 MG/1
40 TABLET, EXTENDED RELEASE ORAL ONCE
Status: COMPLETED | OUTPATIENT
Start: 2025-05-23 | End: 2025-05-23

## 2025-05-23 RX ORDER — FUROSEMIDE 10 MG/ML
40 INJECTION INTRAMUSCULAR; INTRAVENOUS
Status: DISCONTINUED | OUTPATIENT
Start: 2025-05-23 | End: 2025-05-24

## 2025-05-23 RX ORDER — LIDOCAINE 40 MG/G
CREAM TOPICAL
Status: DISCONTINUED | OUTPATIENT
Start: 2025-05-23 | End: 2025-06-02 | Stop reason: HOSPADM

## 2025-05-23 RX ORDER — HYDRALAZINE HYDROCHLORIDE 20 MG/ML
10 INJECTION INTRAMUSCULAR; INTRAVENOUS EVERY 4 HOURS PRN
Status: DISCONTINUED | OUTPATIENT
Start: 2025-05-23 | End: 2025-06-02 | Stop reason: HOSPADM

## 2025-05-23 RX ORDER — FLUMAZENIL 0.1 MG/ML
0.2 INJECTION, SOLUTION INTRAVENOUS
Status: DISCONTINUED | OUTPATIENT
Start: 2025-05-23 | End: 2025-06-02 | Stop reason: HOSPADM

## 2025-05-23 RX ORDER — CARVEDILOL 25 MG/1
25 TABLET ORAL 2 TIMES DAILY WITH MEALS
Status: DISCONTINUED | OUTPATIENT
Start: 2025-05-23 | End: 2025-06-02 | Stop reason: HOSPADM

## 2025-05-23 RX ORDER — GABAPENTIN 300 MG/1
300 CAPSULE ORAL EVERY 8 HOURS
Status: COMPLETED | OUTPATIENT
Start: 2025-05-28 | End: 2025-05-30

## 2025-05-23 RX ORDER — DIBUCAINE 1% 1 G/100G
CREAM TOPICAL 3 TIMES DAILY PRN
Status: DISCONTINUED | OUTPATIENT
Start: 2025-05-23 | End: 2025-05-23

## 2025-05-23 RX ORDER — LORAZEPAM 2 MG/ML
1-2 INJECTION INTRAMUSCULAR EVERY 30 MIN PRN
Status: DISCONTINUED | OUTPATIENT
Start: 2025-05-23 | End: 2025-06-02 | Stop reason: HOSPADM

## 2025-05-23 RX ORDER — BENZOCAINE/MENTHOL 6 MG-10 MG
LOZENGE MUCOUS MEMBRANE 2 TIMES DAILY
Status: DISCONTINUED | OUTPATIENT
Start: 2025-05-23 | End: 2025-06-02 | Stop reason: HOSPADM

## 2025-05-23 RX ORDER — GABAPENTIN 300 MG/1
600 CAPSULE ORAL EVERY 8 HOURS
Status: COMPLETED | OUTPATIENT
Start: 2025-05-26 | End: 2025-05-28

## 2025-05-23 RX ORDER — CLONIDINE HYDROCHLORIDE 0.1 MG/1
0.1 TABLET ORAL EVERY 8 HOURS
Status: DISCONTINUED | OUTPATIENT
Start: 2025-05-23 | End: 2025-05-27

## 2025-05-23 RX ORDER — LACTULOSE 10 G/15ML
20 SOLUTION ORAL 2 TIMES DAILY
Status: DISCONTINUED | OUTPATIENT
Start: 2025-05-23 | End: 2025-05-27

## 2025-05-23 RX ORDER — HALOPERIDOL 5 MG/ML
2.5-5 INJECTION INTRAMUSCULAR EVERY 6 HOURS PRN
Status: DISCONTINUED | OUTPATIENT
Start: 2025-05-23 | End: 2025-06-02 | Stop reason: HOSPADM

## 2025-05-23 RX ADMIN — PANTOPRAZOLE SODIUM 40 MG: 40 TABLET, DELAYED RELEASE ORAL at 19:53

## 2025-05-23 RX ADMIN — POTASSIUM CHLORIDE 10 MEQ: 7.46 INJECTION, SOLUTION INTRAVENOUS at 00:20

## 2025-05-23 RX ADMIN — SPIRONOLACTONE 25 MG: 25 TABLET ORAL at 08:05

## 2025-05-23 RX ADMIN — CLONIDINE HYDROCHLORIDE 0.1 MG: 0.1 TABLET ORAL at 13:03

## 2025-05-23 RX ADMIN — THIAMINE HYDROCHLORIDE 500 MG: 100 INJECTION, SOLUTION INTRAMUSCULAR; INTRAVENOUS at 01:53

## 2025-05-23 RX ADMIN — POTASSIUM CHLORIDE 40 MEQ: 1500 TABLET, EXTENDED RELEASE ORAL at 10:17

## 2025-05-23 RX ADMIN — THIAMINE HYDROCHLORIDE 500 MG: 100 INJECTION, SOLUTION INTRAMUSCULAR; INTRAVENOUS at 13:03

## 2025-05-23 RX ADMIN — PREDNISOLONE SODIUM PHOSPHATE 40 MG: 15 SOLUTION ORAL at 08:05

## 2025-05-23 RX ADMIN — FUROSEMIDE 40 MG: 10 INJECTION, SOLUTION INTRAMUSCULAR; INTRAVENOUS at 16:18

## 2025-05-23 RX ADMIN — THIAMINE HYDROCHLORIDE 500 MG: 100 INJECTION, SOLUTION INTRAMUSCULAR; INTRAVENOUS at 19:52

## 2025-05-23 RX ADMIN — LORAZEPAM 1 MG: 1 TABLET ORAL at 04:34

## 2025-05-23 RX ADMIN — Medication 400 MG: at 19:52

## 2025-05-23 RX ADMIN — MAGNESIUM SULFATE HEPTAHYDRATE 4 G: 40 INJECTION, SOLUTION INTRAVENOUS at 11:19

## 2025-05-23 RX ADMIN — GABAPENTIN 1200 MG: 600 TABLET, FILM COATED ORAL at 01:47

## 2025-05-23 RX ADMIN — PROCHLORPERAZINE MALEATE 10 MG: 10 TABLET ORAL at 04:42

## 2025-05-23 RX ADMIN — Medication 1 TABLET: at 08:05

## 2025-05-23 RX ADMIN — GABAPENTIN 900 MG: 300 CAPSULE ORAL at 08:05

## 2025-05-23 RX ADMIN — FUROSEMIDE 60 MG: 10 INJECTION, SOLUTION INTRAMUSCULAR; INTRAVENOUS at 01:48

## 2025-05-23 RX ADMIN — LACTULOSE 20 G: 20 SOLUTION ORAL at 19:53

## 2025-05-23 RX ADMIN — POTASSIUM CHLORIDE 40 MEQ: 1500 TABLET, EXTENDED RELEASE ORAL at 16:26

## 2025-05-23 RX ADMIN — HYDROCORTISONE: 10 CREAM TOPICAL at 08:05

## 2025-05-23 RX ADMIN — Medication 400 MG: at 16:26

## 2025-05-23 RX ADMIN — CLONIDINE HYDROCHLORIDE 0.1 MG: 0.1 TABLET ORAL at 21:53

## 2025-05-23 RX ADMIN — PANTOPRAZOLE SODIUM 40 MG: 40 TABLET, DELAYED RELEASE ORAL at 08:05

## 2025-05-23 RX ADMIN — CARVEDILOL 25 MG: 25 TABLET, FILM COATED ORAL at 17:52

## 2025-05-23 RX ADMIN — THIAMINE HYDROCHLORIDE 500 MG: 100 INJECTION, SOLUTION INTRAMUSCULAR; INTRAVENOUS at 08:06

## 2025-05-23 RX ADMIN — FUROSEMIDE 40 MG: 10 INJECTION, SOLUTION INTRAMUSCULAR; INTRAVENOUS at 08:06

## 2025-05-23 RX ADMIN — POTASSIUM CHLORIDE 20 MEQ: 1500 TABLET, EXTENDED RELEASE ORAL at 13:03

## 2025-05-23 RX ADMIN — GABAPENTIN 900 MG: 300 CAPSULE ORAL at 16:18

## 2025-05-23 RX ADMIN — CLONIDINE HYDROCHLORIDE 0.1 MG: 0.1 TABLET ORAL at 04:35

## 2025-05-23 RX ADMIN — FOLIC ACID 1 MG: 1 TABLET ORAL at 08:05

## 2025-05-23 RX ADMIN — CARVEDILOL 25 MG: 25 TABLET, FILM COATED ORAL at 08:06

## 2025-05-23 ASSESSMENT — ACTIVITIES OF DAILY LIVING (ADL)
ADLS_ACUITY_SCORE: 61
ADLS_ACUITY_SCORE: 55
ADLS_ACUITY_SCORE: 61
ADLS_ACUITY_SCORE: 41
ADLS_ACUITY_SCORE: 61
ADLS_ACUITY_SCORE: 61
ADLS_ACUITY_SCORE: 41
ADLS_ACUITY_SCORE: 55
ADLS_ACUITY_SCORE: 61
ADLS_ACUITY_SCORE: 61
ADLS_ACUITY_SCORE: 41
ADLS_ACUITY_SCORE: 61
ADLS_ACUITY_SCORE: 55
ADLS_ACUITY_SCORE: 61

## 2025-05-23 ASSESSMENT — LIFESTYLE VARIABLES
AGITATION: NORMAL ACTIVITY
AUDITORY DISTURBANCES: NOT PRESENT
ANXIETY: MILDLY ANXIOUS
HEADACHE, FULLNESS IN HEAD: NOT PRESENT
TREMOR: NO TREMOR
VISUAL DISTURBANCES: NOT PRESENT
NAUSEA AND VOMITING: MILD NAUSEA WITH NO VOMITING
TOTAL SCORE: 2
ORIENTATION AND CLOUDING OF SENSORIUM: ORIENTED AND CAN DO SERIAL ADDITIONS
PAROXYSMAL SWEATS: NO SWEAT VISIBLE

## 2025-05-23 NOTE — PROGRESS NOTES
"   05/23/25 1200   Appointment Info   Signing Clinician's Name / Credentials (PT) Evgeny Black DPT       Present no   Living Environment   People in Home alone   Current Living Arrangements apartment   Home Accessibility no concerns   Transportation Anticipated family or friend will provide   Living Environment Comments Pt reports living alone in an apartment. No stairs. Pt reports he plans on returning home alone but can get access to assist if needed.   Self-Care   Usual Activity Tolerance good   Current Activity Tolerance moderate   Regular Exercise No   Equipment Currently Used at Home none   Fall history within last six months no   Activity/Exercise/Self-Care Comment Pt reports being IND at baseline with all ADLs. Pt ambulates w/o an AD at baseline. Pt drives and works a desk job.   General Information   Onset of Illness/Injury or Date of Surgery 05/23/25   Referring Physician Glenda Márquez MD   Patient/Family Therapy Goals Statement (PT) \"To get better\"   Pertinent History of Current Problem (include personal factors and/or comorbidities that impact the POC) Per Chart: Rakesh Carey is a 55 year old male admitted on 5/22/2025 for norovirus.   Existing Precautions/Restrictions fall   Weight-Bearing Status - LLE full weight-bearing   Weight-Bearing Status - RLE full weight-bearing   Cognition   Orientation Status (Cognition) oriented x 3   Pain Assessment   Patient Currently in Pain No   Posture    Posture Forward head position;Protracted shoulders   Range of Motion (ROM)   Range of Motion ROM is WFL   Strength (Manual Muscle Testing)   Strength (Manual Muscle Testing) Able to perform R SLR;Able to perform L SLR   Bed Mobility   Comment, (Bed Mobility) Did not assess   Transfers   Comment, (Transfers) Sit>Stand w/ FWW and CGA   Gait/Stairs (Locomotion)   [place] Level (Gait) contact guard   Assistive Device (Gait) walker, front-wheeled   Distance in Feet (Gait) 5'   Balance "   Balance Comments Adequate static sitting balance; pt ambulates w/ a FWW   Sensory Examination   Sensory Perception patient reports no sensory changes   Clinical Impression   Criteria for Skilled Therapeutic Intervention Yes, treatment indicated   PT Diagnosis (PT) Impaired gait   Influenced by the following impairments Decreased activity tolerance; decreased balance; decreased strength   Functional limitations due to impairments Impaired functional mobility   Clinical Presentation (PT Evaluation Complexity) stable   Clinical Presentation Rationale Clinical judgement   Clinical Decision Making (Complexity) low complexity   Planned Therapy Interventions (PT) balance training;bed mobility training;gait training;patient/family education;strengthening;transfer training;progressive activity/exercise   Risk & Benefits of therapy have been explained evaluation/treatment results reviewed;care plan/treatment goals reviewed;risks/benefits reviewed;current/potential barriers reviewed;participants voiced agreement with care plan;participants included;patient   PT Total Evaluation Time   PT Eval, Low Complexity Minutes (70650) 10   Physical Therapy Goals   PT Frequency Daily   PT Predicted Duration/Target Date for Goal Attainment 05/29/25   PT Goals Bed Mobility;Transfers;Gait   PT: Bed Mobility Supervision/stand-by assist;Supine to/from sit   PT: Transfers Supervision/stand-by assist;Sit to/from stand;Assistive device   PT: Gait Supervision/stand-by assist;Assistive device;100 feet   Interventions   Interventions Quick Adds Gait Training;Therapeutic Activity   Therapeutic Activity   Therapeutic Activities: dynamic activities to improve functional performance Minutes (98598) 18   Symptoms Noted During/After Treatment Fatigue   Treatment Detail/Skilled Intervention Greeted pt sitting in chair, agreed to PT. VSS on RA throughout session. Pt performed sit>stand x 3 w/ FWW and CGA, cues for hygiene. Pt ambulated to the bathroom,  able to perform hygiene without assist. After ambulation, pt returned to chair w/ FWW and CGA, cues to descend in a slow, controlled motion. Pt voicing he feels like he will be able to manage at home by time of discharge. Pt ended session sitting in chair, with all needs met and call light within reach.   Gait Training   Gait Training Minutes (42111) 5   Symptoms Noted During/After Treatment (Gait Training) fatigue   Treatment Detail/Skilled Intervention Pt ambulated w/ FWW and CGA. Pt ambulated with decreased gait speed, downward gaze, FWW out too far in front of pt and heavy reliance on FWW. Verbal cues for upright gaze and posture, FWW proximity and to reduce reliance on FWW. Good carryover with cues. Pt appears to be able to ambulate further but declines citing fatigue. No LOB noted.   Distance in Feet 30'   PT Discharge Planning   PT Plan Assess bed mobility; repeat sit>stands; progress gait w/ FWW   PT Discharge Recommendation (DC Rec) home with home care physical therapy   PT Rationale for DC Rec Pt is below baseline. Pt currently requiring assist with all functional mobility. Pt presents with deficits in activity tolerance, balance, and strength. Due to these deficits, pt would benefit from continued skilled PT services via HHPT to address deficits and improve IND with safety and functional mobility. Pt reports he plans on returning home alone but states he can get help if he needs it. Recommend pt receive a FWW at discharge.   PT Brief overview of current status Goals of therapy will be to address safe mobility and make recs for d/c to next level of care. Pt and RN will continue to follow all falls risk precautions as documented by RN staff while hospitalized. CGA w/ FWW   PT Total Distance Amb During Session (feet) 30   PT Equipment Needed at Discharge walker, rolling   Physical Therapy Time and Intention   Timed Code Treatment Minutes 23   Total Session Time (sum of timed and untimed services) 33

## 2025-05-23 NOTE — PHARMACY-ADMISSION MEDICATION HISTORY
Pharmacist Admission Medication History    Admission medication history is complete. The information provided in this note is only as accurate as the sources available at the time of the update.    Information Source(s): Patient and CareEverywhere/SureScripts via in-person    Pertinent Information: None    Changes made to PTA medication list:  Added: None  Deleted: None  Changed: None    Allergies reviewed with patient and updates made in EHR: yes    Medication History Completed By: Rosie Felton [facility] 5/22/2025 8:10 PM    PTA Med List   Medication Sig Last Dose/Taking    atorvastatin (LIPITOR) 20 MG tablet Take 1 tablet (20 mg) by mouth daily 5/22/2025 Morning    carvedilol (COREG) 25 MG tablet Take 1 tablet (25 mg) by mouth 2 times daily (with meals). 5/22/2025 Morning    hydrochlorothiazide (HYDRODIURIL) 25 MG tablet TAKE 1 TABLET DAILY 5/22/2025 Morning    HYDROcodone-acetaminophen (NORCO) 5-325 MG tablet Take 1 tablet by mouth 3 times daily 5/22/2025 Morning    ibuprofen (ADVIL/MOTRIN) 800 MG tablet TAKE 1 TABLET EVERY 8 HOURS AS NEEDED FOR MODERATE PAIN. Taking    multivitamin, therapeutic (THERA-VIT) TABS tablet Take 1 tablet by mouth daily. Taking    omeprazole (PRILOSEC) 40 MG DR capsule TAKE 1 CAPSULE DAILY 5/22/2025 Morning

## 2025-05-23 NOTE — PROGRESS NOTES
Shift: 0700- 1330    Cognition/Mentation/Neuro: Ax4, calm cooperative, drowsy. CIWAs 6 and 4.   VS: VSS on RA   Cardiac: SR on tele   GI/: Continent, up to bathroom to void. Multiple loose BMS this shift.   Pulmonary: LS diminsihed, denies SOB  Pain: Denies   Drains/Lines: SL PIV  Skin: Scattered bruising, bruise to R hip/ flank from fall PTA   Activity: A1 GB/ walker   Diet: Cardiac, 1800ml FR   Discharge: Pending     Shift summary: Potassium and magnesium replaced per protocols rechecked at 1710 and 1610.    Transferred to [facility]. Report given to RN

## 2025-05-23 NOTE — PROVIDER NOTIFICATION
MD Notification    Notified Person: MD    Notified Person Name: Dot Galarza    Notification Date/Time: 5/23/25 1116    Notification Interaction: Drake     Purpose of Notification: Lab just called with critical result. Enteric panel back- positive for norovirus.    Orders Received: None     Comments:

## 2025-05-23 NOTE — DISCHARGE INSTRUCTIONS
RECOVERY RESOURCES:  While at the hospital you met with a Licensed Alcohol and Drug Counselor (ANNMARIE) and completed a Chemical Health Assessment, which has been sent to the following programs to refer you to residential treatment services:     [facility] (Multiple Locations)  Phone: [phone]  Website: https://[employer]/    [facility] (Multiple Locations)  Phone: [phone]  Website: https://[employer]/    You can expect to hear from the programs to follow up on the referrals, but please reach out to the programs directly with any questions you may have.     To find recovery support meetings near you or online:  AA: https://a[place].org/meetings/ or call [phone]  NA: https://nam[place]es[place] Mars.org/find-a-meeting/ or call 1-254.386.4561  Marijuana Anonymous: https://marijuana-anonymous.org/  [employer] Recovery: https://meetings.smartrecovery.org/meetings/  Refuge Recovery: https://refugerecoverymeetings.org/meetings  Celebrate Recovery: https://www.celebraterBiOM.[employer]/what-we-offer/find-a-cr-meeting  [place] Recovery Connection: https://[facility].org/ or call [phone] (All Recovery Meetings & Telephone Recovery Support)  [facility] Recovery: https://recoverydharma.org/meetings/  Ziyad/Alateen: https://www.um-qnvb-tyouurd-msp.org/ or call  [phone] ([facility]) or [phone] ([facility])     Ways to help cope with sobriety:   Take prescribed medicines as scheduled   Keep follow-up appointments   Talk to others about your concerns   Get regular exercise   Practice deep breathing skills   Eat a healthy diet   Use community resources, including hotline numbers, [facility] crisis and support meetings   Stay sober and avoid places/people/things associated with substance use   Maintain a daily schedule/routine   Get at least 7-8 hours of sleep per night   Create a list 10--20 healthy activities that you can do that are enjoyable and do not involve  substance use   Create daily goals (approx. 1-4 goals) per day and work to achieve them throughout the day     [place] Recovery Connection (MRC): [facility] connects people seeking recovery to resources that help foster and sustain long-term recovery. Whether you are seeking resources for treatment, transportation, housing, job training, education, health care or other pathways to recovery, [facility] is a great place to start. Phone: [phone]. www.[facility] HospitalInnovative Silicon.[employer] (Great listing of all types of recovery and non-recovery related resources).     [place] [employer] [facility] [employer]  [address] [place] [address]  Phone: [phone]  E-mail: admin@[facility] CityCRV.[employer]  Website: https://www.[facility] Tahle[facility]ahSonex[facility] Technology.org/     [facility]  [address] [place] [address]  Phone: [phone]  Website: https://[employer].org/  Hours:  MWF: 1-3p (Virtual and in-person in [place])  Mon-Thurs: 5:30p - 7:30p (virtual only)

## 2025-05-23 NOTE — PLAN OF CARE
Neuro: A&O x4 CIWA score 9, PO ativan given, effective  Tele/cardiac: SR  Respiration: RA, MORENO  Activity: SBA  Pain: denies  Drips: PIV SL  Drains/tubes: None  Skin: scattered bruising  GI/: loose stools x2, enteric panel pending  Aggression color: green  Isolation: enteric precaution  Plan:  GI consult
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
## 2025-05-23 NOTE — PROGRESS NOTES
Attempted to call patient to discuss possible GABY treatment options and to possibly complete an assessment. Phone rang 15+ times and no answer, will continue to attempt to reach patient.       Ernst Peña [facility] on 5/23/2025 at 12:41 PM

## 2025-05-23 NOTE — PROGRESS NOTES
May 23, 2025  Shift:[id number]  Rakesh Carey  55 year old  YOB: 1970    Reason for admission:Hypokalemia [E87.6]  Hypomagnesemia [E83.42]  Generalized weakness [R53.1]  Severe obesity (BMI >= 40) (H) [E66.01]  Acute liver failure without hepatic coma [K72.00]    Cognition/Mentation: AxOx4. Lethargic. CIWA 5  Neuros/CMS:Intact ex tremors   VS:VSS on RA  Cardiac:NSR  GI:Continent. Up to BR. Multiple loose stools  Pulmonary:LS diminished   Pain:Denies   Drains/Lines:PIV SL  Skin:Bruising to R hip, across buttocks, and scattered.    Activity:A1 GB/W.  Diet:Low fat, low salt, no caffeine, 1800ml Fluid restrict  Discharge:Pending     Shift summary:Patient transferred from [facility]. Calm and cooperative. No medications needed per [facility] protocol.  K and Mg replaced x2 with recheck in AM. Ammonia level 101. MD aware. Blood streaked stool d/t hemorrhoids.  Enteric precautions for Norovirus.

## 2025-05-23 NOTE — PROGRESS NOTES
[facility]  ED Nurse Handoff Report    ED Chief complaint: Generalized Weakness      ED Diagnosis:   Final diagnoses:   Acute liver failure without hepatic coma   Severe obesity (BMI >= 40) (H)   Generalized weakness   Hypomagnesemia   Hypokalemia       Code Status: To be discussed with provider    Allergies:   Allergies   Allergen Reactions    Shellfish Allergy Nausea and Vomiting and Rash       Patient Story: Patient reports generalized weakness, diarrhea, nausea/vomiting x3 days. Patient reports he has not been able to eat or drink for 2-3 days.  Focused Assessment:  A&Ox4, generalized weakness. Up with 1 to bedside commode. Diarrhea x5 while in Emergency Department. Void x1. Zofran for nausea.     Treatments and/or interventions provided:   Medications   potassium chloride 10 mEq in 100 mL sterile water infusion (10 mEq Intravenous $New Bag 5/23/25 0020)   thiamine (B-1) injection 500 mg (has no administration in time range)     Followed by   thiamine (B-1) injection 250 mg (has no administration in time range)     Followed by   thiamine (B-1) tablet 100 mg (has no administration in time range)   sodium chloride 0.9% BOLUS 1,000 mL (0 mLs Intravenous Stopped 5/22/25 2145)   magnesium sulfate 2 g in 50 mL sterile water intermittent infusion (0 g Intravenous Stopped 5/22/25 2330)   iopamidol (ISOVUE-370) solution 135 mL (135 mLs Intravenous $Given 5/22/25 2206)   sodium chloride 0.9 % bag for CT scan flush (79 mLs Intravenous $Given 5/22/25 2206)   ondansetron (ZOFRAN) injection 4 mg (4 mg Intravenous $Given 5/22/25 2252)     US Abdomen Limited   Final Result   IMPRESSION:   1.  Distended gallbladder with gallbladder sludge and mild wall thickening. Trace ascites. Sonographic findings are nonspecific. Consider MRCP or biliary scintigraphy as warranted clinically.            CT Abdomen Pelvis w Contrast   Final Result   IMPRESSION:    1.  Hepatic steatosis, hepatomegaly, early cirrhosis, and small  volume ascites.   2.  Gallbladder distention with probable cholelithiasis. If there is suspicion for acute cholecystitis, consider right upper quadrant ultrasound.   3.  Possible urinary bladder infection. Correlate with urinalysis.   4.  Nearly 5 cm complex left renal cyst. On an elective nonemergent basis, this warrants renal mass protocol CT.        Labs Ordered and Resulted from Time of ED Arrival to Time of ED Departure   BASIC METABOLIC PANEL - Abnormal       Result Value    Sodium 141      Potassium 3.2 (*)     Chloride 104      Carbon Dioxide (CO2) 21 (*)     Anion Gap 16 (*)     Urea Nitrogen 7.9      Creatinine 1.25 (*)     GFR Estimate 68      Calcium 7.8 (*)     Glucose 121 (*)    HEPATIC FUNCTION PANEL - Abnormal    Protein Total 7.2      Albumin 3.0 (*)     Bilirubin Total 3.8 (*)     Alkaline Phosphatase 286 (*)      (*)     ALT 62      Bilirubin Direct 2.49 (*)    MAGNESIUM - Abnormal    Magnesium 1.4 (*)    UA MACROSCOPIC WITH REFLEX TO MICRO AND CULTURE - Abnormal    Color Urine Dark Yellow (*)     Appearance Urine Clear      Glucose Urine Negative      Bilirubin Urine Small (*)     Ketones Urine Negative      Specific Gravity Urine 1.010      Blood Urine Negative      pH Urine 6.0      Protein Albumin Urine 50 (*)     Urobilinogen Urine 4.0 (*)     Nitrite Urine Negative      Leukocyte Esterase Urine Negative      Mucus Urine Present (*)     RBC Urine 2      WBC Urine 3      Squamous Epithelials Urine <1      Hyaline Casts Urine 18 (*)    ETHANOL LEVEL BLOOD - Abnormal    Ethanol Level Blood 0.18 (*)    CBC WITH PLATELETS AND DIFFERENTIAL - Abnormal    WBC Count 6.6      RBC Count 3.54 (*)     Hemoglobin 11.8 (*)     Hematocrit 34.1 (*)     MCV 96      MCH 33.3 (*)     MCHC 34.6      RDW 17.6 (*)     Platelet Count 72 (*)     % Neutrophils 80      % Lymphocytes 11      % Monocytes 7      % Eosinophils 1      % Basophils 1      % Immature Granulocytes 0      NRBCs per 100 WBC 0      Absolute  Neutrophils 5.3      Absolute Lymphocytes 0.7 (*)     Absolute Monocytes 0.5      Absolute Eosinophils 0.0      Absolute Basophils 0.1      Absolute Immature Granulocytes 0.0      Absolute NRBCs 0.0     INR - Abnormal    INR 1.56 (*)     PT 18.2 (*)    KETONE BETA-HYDROXYBUTYRATE QUANTITATIVE, RAPID - Abnormal    Ketone (Beta-Hydroxybutyrate) Quantitative 0.35 (*)    CK TOTAL - Normal           Patient's response to treatments and/or interventions: Patient tolerated medications, labs, imaging. Bedside commode- diarrhea.     To be done/followed up on inpatient unit:  Continue to follow plan of care. Comfort measures.    Does this patient have any cognitive concerns?: none.    Activity level - Baseline/Home:  Independent  Activity Level - Current:   Stand with Assist    Patient's Preferred language: English   Needed?: No    Isolation: None  Infection: Not Applicable  Patient tested for COVID 19 prior to admission: NO  Bariatric?: No    Vital Signs:   Vitals:    05/22/25 2030 05/22/25 2100 05/22/25 2230 05/23/25 0015   BP: 127/79 121/70 115/72 133/84   Pulse: 86 93 89    Resp:  18     Temp:       TempSrc:       SpO2: 96% 97% 95%        Cardiac Rhythm: Sinus rhythm    Was the PSS-3 completed:   Yes  What interventions are required if any?  None.  Family Comments: Ex-wife was with patient in the emergency department.  OBS brochure/video discussed/provided to patient/family: N/A              Name of person given brochure if not patient: n/a              Relationship to patient: n/a    For the majority of the shift this patient's behavior was Green.   Behavioral interventions performed were none.    ED NURSE PHONE NUMBER: [phone]

## 2025-05-23 NOTE — PROGRESS NOTES
RECEIVING UNIT ED HANDOFF REVIEW    ED Nurse Handoff Report was reviewed by: Eden Hammonds RN on May 23, 2025 at 2:48 AM

## 2025-05-23 NOTE — CONSULTS
"Met with patient to discuss possible GABY treatment options and to possibly complete an assessment. Patient declined any interest in GABY treatment at this time but \"maybe AA\".  Agreed to receive information for sober support options in his AVS. Will place GABY resources in his AVS.     Ernst Peña [facility] on 5/23/2025 at 2:23 PM        "

## 2025-05-23 NOTE — ED PROVIDER NOTES
"  Emergency Department Note      History of Present Illness     Chief Complaint   Generalized Weakness      HPI   Rakesh Carey is a very pleasant 55 year old male with a history of alcohol and tobacco abuse, vitamin D deficiencies, Hyperlipidemia, and hypertension, presenting with generalized weakness. The patient has had little to no strength, so much so that he cannot walk, confusion, drowsiness, and brown \"liquid\" bowels for at least 2 weeks. Also has had some bright red blood intermittently in his stools, though he attributes this to his hemorrhoids. Also has had a few episodes of bowel incontinence. He has noticed a slight darkening of his urine, but that fluctuates day by day. Patient has no diagnosed history of liver failure, but has started drinking every day, consuming around 1/5 of a handle of vodka everyday over the last 6 months. He says that it's because of stressors including a divorce with an ex-wife who brought him in today. The patient's ex-wife noted that he has been heavily drinking before the divorce. She stated that her son had noticed that the patient had some issues staying awake throughout the entire work day and that there was a recent yellowing of his skin. The patient has not had any issues with abdominal pain, distention, or vomiting, though he states he does not vomit because he does not eat due to nausea. No cough, shortness of breath, or fevers. No falls or head trauma.    Independent Historian   Ex-wife as detailed above.    Review of External Notes   None. I personally reviewed notes from the patient's progress note dated 8/13/2. This provided me with information regarding patient's baseline medical problems.     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    TONSILLECTOMY & ADENOIDECTOMY  Age 3       Physical Exam     Patient Vitals for the past 24 hrs:   BP Temp Temp src Pulse Resp SpO2   05/22/25 2230 115/72 -- -- 89 -- 95 %   05/22/25 2100 121/70 -- -- 93 18 97 %   05/22/25 2030 127/79 -- -- 86 -- 96 %   05/22/25 2015 -- -- -- 83 -- 94 %   05/22/25 1652 105/70 97.4  F (36.3  C) Temporal 86 22 97 %     Physical Exam  {List of [facility] Exams:[id number]::\" \"}    Diagnostics     Lab Results   Labs Ordered and Resulted from Time of ED Arrival to Time of ED Departure   BASIC METABOLIC PANEL - Abnormal       Result Value    Sodium 141      Potassium 3.2 (*)     Chloride 104      Carbon Dioxide (CO2) 21 (*)     Anion Gap 16 (*)     Urea Nitrogen 7.9      Creatinine 1.25 (*)     GFR Estimate 68      Calcium 7.8 (*)     Glucose 121 (*)    HEPATIC FUNCTION PANEL - Abnormal    Protein Total 7.2      Albumin 3.0 (*)     Bilirubin Total 3.8 (*)     Alkaline Phosphatase 286 (*)      (*)     ALT 62      Bilirubin Direct 2.49 (*)    MAGNESIUM - Abnormal    Magnesium 1.4 (*)    UA MACROSCOPIC WITH REFLEX TO MICRO AND CULTURE - Abnormal    Color Urine Dark Yellow (*)     Appearance Urine Clear      Glucose Urine Negative      Bilirubin Urine Small (*)     Ketones Urine Negative      Specific Gravity Urine 1.010      Blood Urine Negative      pH Urine 6.0      Protein Albumin Urine 50 (*)     Urobilinogen Urine 4.0 (*)     Nitrite Urine Negative      Leukocyte Esterase Urine Negative      Mucus Urine Present (*)     RBC Urine 2      WBC Urine 3      Squamous Epithelials Urine <1      Hyaline Casts Urine 18 (*)    ETHANOL LEVEL BLOOD - Abnormal    Ethanol Level Blood 0.18 (*) "    CBC WITH PLATELETS AND DIFFERENTIAL - Abnormal    WBC Count 6.6      RBC Count 3.54 (*)     Hemoglobin 11.8 (*)     Hematocrit 34.1 (*)     MCV 96      MCH 33.3 (*)     MCHC 34.6      RDW 17.6 (*)     Platelet Count 72 (*)     % Neutrophils 80      % Lymphocytes 11      % Monocytes 7      % Eosinophils 1      % Basophils 1      % Immature Granulocytes 0      NRBCs per 100 WBC 0      Absolute Neutrophils 5.3      Absolute Lymphocytes 0.7 (*)     Absolute Monocytes 0.5      Absolute Eosinophils 0.0      Absolute Basophils 0.1      Absolute Immature Granulocytes 0.0      Absolute NRBCs 0.0     INR - Abnormal    INR 1.56 (*)     PT 18.2 (*)    KETONE BETA-HYDROXYBUTYRATE QUANTITATIVE, RAPID - Abnormal    Ketone (Beta-Hydroxybutyrate) Quantitative 0.35 (*)    CK TOTAL - Normal             Imaging   CT Abdomen Pelvis w Contrast   Final Result   IMPRESSION:    1.  Hepatic steatosis, hepatomegaly, early cirrhosis, and small volume ascites.   2.  Gallbladder distention with probable cholelithiasis. If there is suspicion for acute cholecystitis, consider right upper quadrant ultrasound.   3.  Possible urinary bladder infection. Correlate with urinalysis.   4.  Nearly 5 cm complex left renal cyst. On an elective nonemergent basis, this warrants renal mass protocol CT.      US Abdomen Limited    (Results Pending)       EKG   ECG taken at 2000, ECG read at 2007  Normal Sinus rhythm   Normal ECG   Rate 86 bpm. AK interval 166 ms. QRS duration 90 ms. QT/QTc 398/476 ms. P-R-T axes 48 65 34.     Independent Interpretation   None    ED Course      Medications Administered   Medications   potassium chloride 10 mEq in 100 mL sterile water infusion (has no administration in time range)   sodium chloride 0.9% BOLUS 1,000 mL (0 mLs Intravenous Stopped 5/22/25 2145)   magnesium sulfate 2 g in 50 mL sterile water intermittent infusion (2 g Intravenous $New Bag 5/22/25 2225)   iopamidol (ISOVUE-370) solution 135 mL (135 mLs  Intravenous $Given 5/22/25 2206)   sodium chloride 0.9 % bag for CT scan flush (79 mLs Intravenous $Given 5/22/25 2206)   ondansetron (ZOFRAN) injection 4 mg (4 mg Intravenous $Given 5/22/25 2252)       Procedures   Procedures   None performed    Discussion of Management   None    ED Course   ED Course as of 05/22/25 2341   u May 22, 2025   1908 I obtained history and examined the patient as noted above.         Additional Documentation  None    Medical Decision Making / Diagnosis     CMS Diagnoses: None    MIPS       None    MDM   {MDM simple  :365603}    {Decision Rule Calculators:376041}  {SDCCHECKLIST:990410}         Disposition   The patient was admitted to the hospital.     Diagnosis   No diagnosis found.     Discharge Medications   New Prescriptions    No medications on file      administration in time range)   LORazepam (ATIVAN) tablet 1-2 mg (has no administration in time range)     Or   LORazepam (ATIVAN) injection 1-2 mg (has no administration in time range)   gabapentin (NEURONTIN) capsule 900 mg (has no administration in time range)   gabapentin (NEURONTIN) capsule 600 mg (has no administration in time range)   gabapentin (NEURONTIN) capsule 300 mg (has no administration in time range)   gabapentin (NEURONTIN) capsule 100 mg (has no administration in time range)   multivitamin w/minerals (THERA-VIT-M) tablet 1 tablet (has no administration in time range)   folic acid (FOLVITE) tablet 1 mg (has no administration in time range)   spironolactone (ALDACTONE) tablet 25 mg (has no administration in time range)   furosemide (LASIX) injection 40 mg (has no administration in time range)   prednisoLONE (ORAPRED ODT) ODT tab 40 mg (has no administration in time range)   sodium chloride 0.9% BOLUS 1,000 mL (0 mLs Intravenous Stopped 5/22/25 2145)   magnesium sulfate 2 g in 50 mL sterile water intermittent infusion (0 g Intravenous Stopped 5/22/25 2330)   potassium chloride 10 mEq in 100 mL sterile water infusion (10 mEq Intravenous $New Bag 5/23/25 0020)   iopamidol (ISOVUE-370) solution 135 mL (135 mLs Intravenous $Given 5/22/25 2206)   sodium chloride 0.9 % bag for CT scan flush (79 mLs Intravenous $Given 5/22/25 2206)   ondansetron (ZOFRAN) injection 4 mg (4 mg Intravenous $Given 5/22/25 2252)   gabapentin (NEURONTIN) tablet 1,200 mg (1,200 mg Oral $Given 5/23/25 0147)   furosemide (LASIX) injection 60 mg (60 mg Intravenous $Given 5/23/25 0148)       Procedures   Procedures   None performed    Discussion of Management   Admitting Hospitalist, Dr. Márquez dictating    ED Course   ED Course as of 05/23/25 0212   Thu May 22, 2025   1908 I obtained history and examined the patient as noted above.     Fri May 23, 2025   0034 Consultation: I discussed the patient's presentation, workup, assessment,  plan and disposition with hospitalist Dr Márquez.         Additional Documentation  None    Medical Decision Making / Diagnosis     CMS Diagnoses: None    MIPS       None    MDM   Patient presenting with generalized weakness, fatigue, confusion.  Vital signs on arrival are reassuring.  High suspicion for alcohol related pathology given patient's reported use of alcohol.  Also considered broad differential including new onset diabetes, urinary tract infection, electrolyte abnormality, metabolic disturbance, among others.  Workup ultimately notable for evidence of acute liver failure, with elevated AST, direct hyperbilirubinemia, and elevated INR.  Patient has metabolic acidosis secondary to alcohol.  He also appears dry, with acute kidney injury and hyaline casts in urine.  CT was obtained to assess for ascites, showed a small amount of ascites along with early cirrhosis.  There are some question of gallbladder inflammation.  We did obtain right upper quadrant ultrasound but I do not think this represents acute cholecystitis, more likely reactive in the setting of the patient's new ascites and hepatitis.  Patient has no tenderness in the right upper quadrant, no fever, no leukocytosis.  Also noted were hypomagnesemia and hypokalemia. I repleted electrolytes. Admitted the patient to the hospital for further evaluation and management.     Disposition   The patient was admitted to the hospital.     Diagnosis     ICD-10-CM    1. Acute liver failure without hepatic coma  K72.00       2. Severe obesity (BMI >= 40) (H)  E66.01       3. Generalized weakness  R53.1       4. Hypomagnesemia  E83.42       5. Hypokalemia  E87.6            Discharge Medications   New Prescriptions    No medications on file        Stewart Wolff MD  05/23/25 0213

## 2025-05-24 LAB
ALBUMIN SERPL BCG-MCNC: 2.9 G/DL (ref 3.5–5.2)
ALP SERPL-CCNC: 244 U/L (ref 40–150)
ALT SERPL W P-5'-P-CCNC: 56 U/L (ref 0–70)
AMMONIA PLAS-SCNC: 123 UMOL/L (ref 16–60)
ANION GAP SERPL CALCULATED.3IONS-SCNC: 13 MMOL/L (ref 7–15)
AST SERPL W P-5'-P-CCNC: 172 U/L (ref 0–45)
BILIRUB SERPL-MCNC: 3.6 MG/DL
BUN SERPL-MCNC: 8.1 MG/DL (ref 6–20)
CALCIUM SERPL-MCNC: 7.6 MG/DL (ref 8.8–10.4)
CHLORIDE SERPL-SCNC: 100 MMOL/L (ref 98–107)
CREAT SERPL-MCNC: 1.2 MG/DL (ref 0.67–1.17)
EGFRCR SERPLBLD CKD-EPI 2021: 71 ML/MIN/1.73M2
GLUCOSE SERPL-MCNC: 136 MG/DL (ref 70–99)
HCO3 SERPL-SCNC: 24 MMOL/L (ref 22–29)
INR PPP: 1.58 (ref 0.85–1.15)
MAGNESIUM SERPL-MCNC: 1.6 MG/DL (ref 1.7–2.3)
POTASSIUM SERPL-SCNC: 3.2 MMOL/L (ref 3.4–5.3)
POTASSIUM SERPL-SCNC: 3.2 MMOL/L (ref 3.4–5.3)
POTASSIUM SERPL-SCNC: 3.5 MMOL/L (ref 3.4–5.3)
PROT SERPL-MCNC: 6.9 G/DL (ref 6.4–8.3)
PROTHROMBIN TIME: 18.4 SECONDS (ref 11.8–14.8)
SODIUM SERPL-SCNC: 137 MMOL/L (ref 135–145)

## 2025-05-24 PROCEDURE — 36415 COLL VENOUS BLD VENIPUNCTURE: CPT | Performed by: PHYSICIAN ASSISTANT

## 2025-05-24 PROCEDURE — 250N000013 HC RX MED GY IP 250 OP 250 PS 637: Performed by: HOSPITALIST

## 2025-05-24 PROCEDURE — 250N000013 HC RX MED GY IP 250 OP 250 PS 637: Performed by: INTERNAL MEDICINE

## 2025-05-24 PROCEDURE — 250N000013 HC RX MED GY IP 250 OP 250 PS 637: Performed by: STUDENT IN AN ORGANIZED HEALTH CARE EDUCATION/TRAINING PROGRAM

## 2025-05-24 PROCEDURE — 250N000011 HC RX IP 250 OP 636: Performed by: STUDENT IN AN ORGANIZED HEALTH CARE EDUCATION/TRAINING PROGRAM

## 2025-05-24 PROCEDURE — 250N000012 HC RX MED GY IP 250 OP 636 PS 637: Performed by: STUDENT IN AN ORGANIZED HEALTH CARE EDUCATION/TRAINING PROGRAM

## 2025-05-24 PROCEDURE — 99233 SBSQ HOSP IP/OBS HIGH 50: CPT | Performed by: HOSPITALIST

## 2025-05-24 PROCEDURE — 80053 COMPREHEN METABOLIC PANEL: CPT | Performed by: PHYSICIAN ASSISTANT

## 2025-05-24 PROCEDURE — 82140 ASSAY OF AMMONIA: CPT | Performed by: PHYSICIAN ASSISTANT

## 2025-05-24 PROCEDURE — 120N000001 HC R&B MED SURG/OB

## 2025-05-24 PROCEDURE — 999N000040 HC STATISTIC CONSULT NO CHARGE VASC ACCESS

## 2025-05-24 PROCEDURE — 84132 ASSAY OF SERUM POTASSIUM: CPT | Performed by: HOSPITALIST

## 2025-05-24 PROCEDURE — 999N000127 HC STATISTIC PERIPHERAL IV START W US GUIDANCE

## 2025-05-24 PROCEDURE — 83735 ASSAY OF MAGNESIUM: CPT | Performed by: HOSPITALIST

## 2025-05-24 PROCEDURE — 85610 PROTHROMBIN TIME: CPT | Performed by: PHYSICIAN ASSISTANT

## 2025-05-24 RX ORDER — FUROSEMIDE 10 MG/ML
10 INJECTION INTRAMUSCULAR; INTRAVENOUS DAILY
Status: DISCONTINUED | OUTPATIENT
Start: 2025-05-24 | End: 2025-05-30

## 2025-05-24 RX ORDER — POTASSIUM CHLORIDE 1500 MG/1
20 TABLET, EXTENDED RELEASE ORAL ONCE
Status: COMPLETED | OUTPATIENT
Start: 2025-05-24 | End: 2025-05-24

## 2025-05-24 RX ORDER — MAGNESIUM OXIDE 400 MG/1
400 TABLET ORAL EVERY 4 HOURS
Status: COMPLETED | OUTPATIENT
Start: 2025-05-24 | End: 2025-05-24

## 2025-05-24 RX ORDER — POTASSIUM CHLORIDE 1500 MG/1
40 TABLET, EXTENDED RELEASE ORAL ONCE
Status: COMPLETED | OUTPATIENT
Start: 2025-05-24 | End: 2025-05-24

## 2025-05-24 RX ADMIN — GABAPENTIN 900 MG: 300 CAPSULE ORAL at 08:12

## 2025-05-24 RX ADMIN — Medication 1 TABLET: at 08:12

## 2025-05-24 RX ADMIN — PANTOPRAZOLE SODIUM 40 MG: 40 TABLET, DELAYED RELEASE ORAL at 08:12

## 2025-05-24 RX ADMIN — POTASSIUM CHLORIDE 40 MEQ: 1500 TABLET, EXTENDED RELEASE ORAL at 00:08

## 2025-05-24 RX ADMIN — CARVEDILOL 25 MG: 25 TABLET, FILM COATED ORAL at 08:12

## 2025-05-24 RX ADMIN — THIAMINE HYDROCHLORIDE 500 MG: 100 INJECTION, SOLUTION INTRAMUSCULAR; INTRAVENOUS at 13:47

## 2025-05-24 RX ADMIN — GABAPENTIN 900 MG: 300 CAPSULE ORAL at 17:02

## 2025-05-24 RX ADMIN — POTASSIUM CHLORIDE 40 MEQ: 1500 TABLET, EXTENDED RELEASE ORAL at 08:12

## 2025-05-24 RX ADMIN — POTASSIUM CHLORIDE 20 MEQ: 1500 TABLET, EXTENDED RELEASE ORAL at 17:45

## 2025-05-24 RX ADMIN — CLONIDINE HYDROCHLORIDE 0.1 MG: 0.1 TABLET ORAL at 13:47

## 2025-05-24 RX ADMIN — CARVEDILOL 25 MG: 25 TABLET, FILM COATED ORAL at 17:02

## 2025-05-24 RX ADMIN — SPIRONOLACTONE 25 MG: 25 TABLET ORAL at 08:12

## 2025-05-24 RX ADMIN — OXYCODONE HYDROCHLORIDE 2.5 MG: 5 TABLET ORAL at 21:34

## 2025-05-24 RX ADMIN — FOLIC ACID 1 MG: 1 TABLET ORAL at 08:12

## 2025-05-24 RX ADMIN — PROCHLORPERAZINE MALEATE 10 MG: 10 TABLET ORAL at 08:12

## 2025-05-24 RX ADMIN — Medication 400 MG: at 11:24

## 2025-05-24 RX ADMIN — PREDNISOLONE SODIUM PHOSPHATE 40 MG: 15 SOLUTION ORAL at 08:16

## 2025-05-24 RX ADMIN — PANTOPRAZOLE SODIUM 40 MG: 40 TABLET, DELAYED RELEASE ORAL at 21:34

## 2025-05-24 RX ADMIN — GABAPENTIN 900 MG: 300 CAPSULE ORAL at 00:08

## 2025-05-24 RX ADMIN — LACTULOSE 20 G: 20 SOLUTION ORAL at 21:34

## 2025-05-24 RX ADMIN — FUROSEMIDE 40 MG: 10 INJECTION, SOLUTION INTRAMUSCULAR; INTRAVENOUS at 08:12

## 2025-05-24 RX ADMIN — Medication 5 MG: at 21:34

## 2025-05-24 RX ADMIN — CLONIDINE HYDROCHLORIDE 0.1 MG: 0.1 TABLET ORAL at 21:34

## 2025-05-24 RX ADMIN — THIAMINE HYDROCHLORIDE 500 MG: 100 INJECTION, SOLUTION INTRAMUSCULAR; INTRAVENOUS at 08:12

## 2025-05-24 RX ADMIN — CLONIDINE HYDROCHLORIDE 0.1 MG: 0.1 TABLET ORAL at 05:42

## 2025-05-24 RX ADMIN — HYDROCORTISONE: 10 CREAM TOPICAL at 22:49

## 2025-05-24 RX ADMIN — Medication 400 MG: at 08:12

## 2025-05-24 ASSESSMENT — ACTIVITIES OF DAILY LIVING (ADL)
ADLS_ACUITY_SCORE: 61

## 2025-05-24 NOTE — PROGRESS NOTES
Patient admitted earlier today for alcoholic hepatitis started on prednisolone. GI consulted. Input appreciated. Recommends weekly 10mg taper. Hepatic encephalopathy with ammonia level 101, started on lactulose BID. Due to alcohol withdrawal, patient requiring high doses of gabapentin however with abnormal liver enzymes, will need to monitor liver enzymes closely. Monitor LFTs daily.Started on lasix 40mg IV BID + aldactone.     Patient found to have Norovirus - continue with supportive care.    No Charge,    Dot Landry MD MPH

## 2025-05-24 NOTE — PROGRESS NOTES
May 24, 2025  Shift:[id number]  Rakesh Carye  55 year old  YOB: 1970    Reason for admission:Hypokalemia [E87.6]  Hypomagnesemia [E83.42]  Generalized weakness [R53.1]  Severe obesity (BMI >= 40) (H) [E66.01]  Acute liver failure without hepatic coma [K72.00]    Cognition/Mentation: AxOX4. Forgetful  Neuros/CMS:Tremors CIWA: 5/4/4  VS:VSS on RA.  GI:Continent. Up to BR. Loose stools x2  :Continent. Up to BR  Pulmonary:LS diminished   Pain:Denies   Drains/Lines:PIV SL  Skin:Bruising to R hip. Across buttocks. Scattered   Activity:A1 GB/W  Diet: Low fat, low salt, no caffeine, 1800ml Fluid restrict   Discharge:Pending.     Shift summary:K and Mg replaced.  Blood streaked stool d/t hemorrhoids. Lasix held d/t creat 1.2. Ammonia 123. Lactulose already ordered. Patient now interested in tx. MD aware and will re-consult chem dep.   Enteric precautions for Norovirus.

## 2025-05-24 NOTE — PROGRESS NOTES
PROGRESS NOTE    ASSESSMENT AND PLAN: 55 year old male with a past medical history of peptic ulcer disease, GERD, diverticulitis, anxiety, tobacco use, hypertension, and TBI who presents with progressive whole-body weakness/decreased appetite for 2 weeks.  GI consulted for EtOH hepatitis.  -LFT quite consistent with EtOH pattern with ALT 62, , total bilirubin 3.8, and alkaline phosphatase 286. Ferritin level elevated 2432, iron index 88% so we will need to follow-up as outpatient for hemochromatosis but this is likely due to acute phase reactant at this time  -Patient complain about nausea and vomiting, and found to have norovirus  -Madrey score 32, continue with prednisone 40 mg daily with weekly 10 mg taper  -Patient also has underlying liver cirrhosis given ascites around the liver with low platelet count  -Patient also complains about memory issue, with underlying liver cirrhosis, concern for hepatic encephalopathy, hence recommend to check ammonia level, if elevated, can start lactulose twice a day  -5/24/25- Daily LFT with INR (ordered)  -Ammonia level in AM (ordered)  -Can start low-dose diuretic such as Lasix 10 mg with Aldactone 25 mg  -- continue supportive care to include IV fluids, analgesics and antiemetics  -Can do outpatient Fibroscan for further staging of liver cirrhosis  -Recommend MINDS protocol  -Recommend LADC consult as patient is interested in alcohol cessation  -GI will continue to follow as inpatient    INTERIM VISIT: (admission day 2): Patient having numerous loose stools since starting lactulose last night. He reports he still feels some confusion. LFTs trending down. Ammonia level not checked this AM, so will check it tomorrow AM since patient hasn't been on lactulose for 24 hours yet    PHYSICAL EXAM:  Vital Signs: Temp: 98  F (36.7  C) Temp src: Oral BP: 136/77 Pulse: 82   Resp: 18 SpO2: 94 % O2 Device: None (Room air)    Weight: 323 lbs 3.11 oz      Recent Labs   Lab Test  05/24/25  0436 05/23/25  2041 05/23/25  1527 05/23/25  0655   POTASSIUM 3.2*  3.2* 3.3*   < > 2.7*   CHLORIDE 100  --   --  105   BUN 8.1  --   --  8.0    < > = values in this interval not displayed.       Recent Labs   Lab Test 05/24/25  0436 05/23/25  0655 05/22/25 2050 05/22/25 1957   INR 1.58*  --  1.56*  --    WBC  --  6.7  --  6.6   HGB  --  10.5*  --  11.8*   PLT  --  64*  --  72*     Active Problems:    Severe obesity (BMI >= 40) (H)    Hypokalemia    Hypomagnesemia    Generalized weakness    Acute liver failure without hepatic coma      ADDITIONAL COMMENTS:    I reviewed all medications, new labs and imaging results over the last 24 hours.   I discussed the patient's progress with Dr. Katie Box, MSPAS, PAJAIL[facility]   [phone] Office  [phone] Cell

## 2025-05-24 NOTE — PLAN OF CARE
Goal Outcome Evaluation:         Summary:  Vomiting, Diarrhea, Weakness, (+) Norovirus, ETOH Use    DATE & TIME: 5/23/25-5/24/25  23:00-07:30      Cognitive Concerns/ Orientation : A&O x4, forgetful, calm and pleasant  BEHAVIOR & AGGRESSION TOOL COLOR: Green  CIWA SCORE: 3, 3 (tremors)- no PRN meds given  ABNL VS/O2: VSS on RA, except for elevated BP  MOBILITY: SBA with FWW  PAIN MANAGMENT: Denies  DIET: Low Saturated Fat 2g NA, No Caffeine Diet, 1800 ml FR  BOWEL/BLADDER: continent , up to BR, multiple LBM this shift  ABNL LAB/BG: K+ 3.2 replaced- 3.2 on recheck-for replacement-verifying from MD mayes Aldactone.  Mg 1.6-for replacement-recheck ordered for tomorrow  DRAIN/DEVICES: L PIV-SL  TELEMETRY RHYTHM: NSR  SKIN: scattered bruises, galindo BLE  TESTS/PROCEDURES: none this shift  D/C DAY/GOALS/PLACE: pending  OTHER IMPORTANT INFO: ChemDep GI following

## 2025-05-24 NOTE — PROGRESS NOTES
[facility]    Medicine Progress Note - Hospitalist Service    Date of Admission:  5/22/2025    Assessment & Plan     Rakesh Carey is a 55 year old male admitted on 5/22/2025.      Alcoholic Hepatitis  Alcoholic Cirrhosis, new diagnosis with Anasarca  Alcoholic Ketoacidosis  Hypoalbuminemia  Obesity  Deconditioning  MELD 19  CT showed Hepatic steatosis, hepatomegaly, early cirrhosis, and small volume ascites   --GI consult: Input appreciated  --LFTs trending low.  -- Continue to hold statin in setting of liver injury.  -- Continue prednisolone 40mg daily as MADDREY score is 32  -- Continue Aldactone and Lasix   --PT eval: Home with home PT  --Fluid restriction    Norovirus infection  -- Continue supportive care.     Distended gallbladder with gallbladder sludge and mild wall thickening   Hemorroidal rectal bleeding  No RUQ tenderness, ALT wnl at 62 while  likely from ETOH use  In regards to Vomiting/diarrhea, could be enteritis vs related to the gallbladder issues  --GI consulted  --defer MRCP to GI  --Imodium only if stool sample collected  --rectal sample without C diff  --Antiemetics in place,   --Hemorroid cream  --Viral panel: Positive for norovirus     LARISA  Likely 2/2 renal congestion  --Lasix BID ordered in addition to start PO aldactone.   --Hold Lasix due to rising creatinine.  Reevaluate in AM.  --trend creatinine  --If worsening, might need to consider albumin adminstration for possible HRS     Alcohol Use w/ active Wtihdrawal  GERD  Last drink 1 day PTA. No hx prior withdrawal  --thiamine/folate/gabapentin/clonidine ordered in conjugction with CIWA protocol  --CD ordered per patient preference  --resumed PTA PPI     Hypomagnesemia  Hypokalemia  --telemetry  --RN based repletion ordered  --Hold PTA hydrochlorothiazide     Anemia  Thrombocytopenia  Likely 2/2 liver disease  --trend Hb/plts  --anemia workup     HTN  HLD  --hold statin given hepatitis  --resumed coreg but  "not hydrochlorothiazide given hypokalemia     5 cm complex left renal cyst  --outpatient follow-up w/ renal mass CT     Chronic back pain   Lumbar spolndylosis W/ radiculopathy  S/p B/l L5-S1 TFESI 1/2025  --max tylenol 2g  --Low dose oxy only given cirrhosis        Diet: Combination Diet Low Saturated Fat Na <2400mg Diet, No Caffeine Diet  Fluid restriction 1800 ML FLUID    DVT Prophylaxis: Pneumatic Compression Devices  Infante Catheter: Not present  Lines: None     Cardiac Monitoring: ACTIVE order. Indication: Electrolyte Imbalance (24 hours)- Magnesium <1.3 mg/ml; Potassium < =2.8 or > 5.5 mg/ml  Code Status: Full Code      Clinically Significant Risk Factors        # Hypokalemia: Lowest K = 2.7 mmol/L in last 2 days, will replace as needed      # Hypomagnesemia: Lowest Mg = 1.4 mg/dL in last 2 days, will replace as needed   # Hypoalbuminemia: Lowest albumin = 2.6 g/dL at 5/23/2025  6:55 AM, will monitor as appropriate  # Coagulation Defect: INR = 1.58 (Ref range: 0.85 - 1.15) and/or PTT = N/A, will monitor for bleeding  # Thrombocytopenia: Lowest platelets = 64 in last 2 days, will monitor for bleeding   # Hypertension: Noted on problem list            # Morbid Obesity: Estimated body mass index is 46.37 kg/m  as calculated from the following:    Height as of this encounter: 1.778 m (5' 10\").    Weight as of this encounter: 146.6 kg (323 lb 3.1 oz)., PRESENT ON ADMISSION            Social Drivers of Health    Tobacco Use: Medium Risk (8/13/2024)    Patient History     Smoking Tobacco Use: Former     Smokeless Tobacco Use: Never   Physical Activity: Inactive (8/13/2024)    Exercise Vital Sign     Days of Exercise per Week: 0 days     Minutes of Exercise per Session: 0 min   Stress: Stress Concern Present (8/13/2024)    [employer] [place] [employer] - Occupational Stress Questionnaire     Feeling of Stress : Rather much   Social Connections: Unknown (8/13/2024)    Social Connection and Isolation Panel " [NHANES]     Frequency of Social Gatherings with Friends and Family: Three times a week          Disposition Plan     Medically Ready for Discharge: Anticipated in 2-4 Days             Dot Shafer MD  Hospitalist Service  [facility]  Securely message with [employer] (more info)  Text page via [employer] Paging/Directory   ______________________________________________________________________    Interval History   Patient laying in bed, notes still feels like he is going through withdrawal.  Complains of lower abdominal crampy pain.    Physical Exam   Vital Signs: Temp: 98.8  F (37.1  C) Temp src: Oral BP: 120/67 Pulse: 67   Resp: 18 SpO2: 92 % O2 Device: None (Room air)    Weight: 323 lbs 3.11 oz    General Appearance: Well appearing for stated age.  Respiratory: CTAB, no rales or ronchi  Cardiovascular: S1, S2 normal, no murmurs  GI: non-tender on palpation, BS present      Medical Decision Making       55 MINUTES SPENT BY ME on the date of service doing chart review, history, exam, documentation & further activities per the note.      Data

## 2025-05-24 NOTE — PROVIDER NOTIFICATION
"MD Notification    Notified Person: MD    Notified Person Name: Dr. Zoila Thurston    Notification Date/Time: 5/24/25 07:00    Notification Interaction: Vocera    Purpose of Notification: Hi. Pt K+ is 3.2 and needs to be replaced. But he is on Aldactone, there's a warning popping up \"Potassium salts (eg, Potassium Preparations) may enhance the hyperkalemic effect of spironolactone\". Pt admitted for vomiting and + Norovirus. Do you still want me to give the potassium? Please advise. Thank you    Orders Received:Yes, give potassium as per protocol, thanks    Comments:     "

## 2025-05-24 NOTE — PLAN OF CARE
Orientation: A&Ox4    Vitals/Tele: VSS RA, denies pain.  CIWA: 4 tremor, sweaty    K+ 3.2, 3.3 - replacement protocol ordered.     IV Access/drains: PIV SL     Diet: Cardiac, 1800ml FR.    Mobility: A1 GB&W     GI/: Continent B&B, diarrhea x1 this shift after lactulose dose. Denies nausea this shift.     Wound/Skin: R hip and buttocks bruising from PTA fall.     Consults: PT/SW    Discharge Plan: Refused CD tx. Possibly home at discharge.       See Flow sheets for assessment

## 2025-05-25 ENCOUNTER — APPOINTMENT (OUTPATIENT)
Dept: PHYSICAL THERAPY | Facility: CLINIC | Age: 55
End: 2025-05-25
Payer: COMMERCIAL

## 2025-05-25 LAB
ALBUMIN SERPL BCG-MCNC: 2.8 G/DL (ref 3.5–5.2)
ALP SERPL-CCNC: 257 U/L (ref 40–150)
ALT SERPL W P-5'-P-CCNC: 58 U/L (ref 0–70)
AMMONIA PLAS-SCNC: 74 UMOL/L (ref 16–60)
ANION GAP SERPL CALCULATED.3IONS-SCNC: 11 MMOL/L (ref 7–15)
AST SERPL W P-5'-P-CCNC: 176 U/L (ref 0–45)
BASOPHILS # BLD AUTO: 0 10E3/UL (ref 0–0.2)
BASOPHILS NFR BLD AUTO: 0 %
BILIRUB DIRECT SERPL-MCNC: 2.04 MG/DL (ref 0–0.3)
BILIRUB SERPL-MCNC: 3.2 MG/DL
BUN SERPL-MCNC: 8.3 MG/DL (ref 6–20)
CALCIUM SERPL-MCNC: 8.3 MG/DL (ref 8.8–10.4)
CHLORIDE SERPL-SCNC: 99 MMOL/L (ref 98–107)
CREAT SERPL-MCNC: 1.01 MG/DL (ref 0.67–1.17)
EGFRCR SERPLBLD CKD-EPI 2021: 88 ML/MIN/1.73M2
EOSINOPHIL # BLD AUTO: 0.1 10E3/UL (ref 0–0.7)
EOSINOPHIL NFR BLD AUTO: 1 %
ERYTHROCYTE [DISTWIDTH] IN BLOOD BY AUTOMATED COUNT: 17.7 % (ref 10–15)
GLUCOSE SERPL-MCNC: 153 MG/DL (ref 70–99)
HCO3 SERPL-SCNC: 24 MMOL/L (ref 22–29)
HCT VFR BLD AUTO: 32.4 % (ref 40–53)
HGB BLD-MCNC: 11 G/DL (ref 13.3–17.7)
IMM GRANULOCYTES # BLD: 0 10E3/UL
IMM GRANULOCYTES NFR BLD: 1 %
INR PPP: 1.61 (ref 0.85–1.15)
LYMPHOCYTES # BLD AUTO: 0.5 10E3/UL (ref 0.8–5.3)
LYMPHOCYTES NFR BLD AUTO: 10 %
MAGNESIUM SERPL-MCNC: 1.3 MG/DL (ref 1.7–2.3)
MAGNESIUM SERPL-MCNC: 1.8 MG/DL (ref 1.7–2.3)
MCH RBC QN AUTO: 33.6 PG (ref 26.5–33)
MCHC RBC AUTO-ENTMCNC: 34 G/DL (ref 31.5–36.5)
MCV RBC AUTO: 99 FL (ref 78–100)
MONOCYTES # BLD AUTO: 0.5 10E3/UL (ref 0–1.3)
MONOCYTES NFR BLD AUTO: 10 %
NEUTROPHILS # BLD AUTO: 3.7 10E3/UL (ref 1.6–8.3)
NEUTROPHILS NFR BLD AUTO: 78 %
NRBC # BLD AUTO: 0.1 10E3/UL
NRBC BLD AUTO-RTO: 2 /100
PLATELET # BLD AUTO: 63 10E3/UL (ref 150–450)
POTASSIUM SERPL-SCNC: 4 MMOL/L (ref 3.4–5.3)
PROT SERPL-MCNC: 7 G/DL (ref 6.4–8.3)
PROTHROMBIN TIME: 18.7 SECONDS (ref 11.8–14.8)
RBC # BLD AUTO: 3.27 10E6/UL (ref 4.4–5.9)
SODIUM SERPL-SCNC: 134 MMOL/L (ref 135–145)
WBC # BLD AUTO: 4.8 10E3/UL (ref 4–11)

## 2025-05-25 PROCEDURE — 120N000001 HC R&B MED SURG/OB

## 2025-05-25 PROCEDURE — 84155 ASSAY OF PROTEIN SERUM: CPT | Performed by: PHYSICIAN ASSISTANT

## 2025-05-25 PROCEDURE — 99233 SBSQ HOSP IP/OBS HIGH 50: CPT | Performed by: HOSPITALIST

## 2025-05-25 PROCEDURE — 82435 ASSAY OF BLOOD CHLORIDE: CPT | Performed by: HOSPITALIST

## 2025-05-25 PROCEDURE — 85004 AUTOMATED DIFF WBC COUNT: CPT | Performed by: HOSPITALIST

## 2025-05-25 PROCEDURE — 85025 COMPLETE CBC W/AUTO DIFF WBC: CPT | Performed by: HOSPITALIST

## 2025-05-25 PROCEDURE — 83735 ASSAY OF MAGNESIUM: CPT | Performed by: STUDENT IN AN ORGANIZED HEALTH CARE EDUCATION/TRAINING PROGRAM

## 2025-05-25 PROCEDURE — 250N000013 HC RX MED GY IP 250 OP 250 PS 637: Performed by: HOSPITALIST

## 2025-05-25 PROCEDURE — 250N000011 HC RX IP 250 OP 636: Performed by: STUDENT IN AN ORGANIZED HEALTH CARE EDUCATION/TRAINING PROGRAM

## 2025-05-25 PROCEDURE — 97530 THERAPEUTIC ACTIVITIES: CPT | Mod: GP

## 2025-05-25 PROCEDURE — 82374 ASSAY BLOOD CARBON DIOXIDE: CPT | Performed by: HOSPITALIST

## 2025-05-25 PROCEDURE — 85610 PROTHROMBIN TIME: CPT | Performed by: PHYSICIAN ASSISTANT

## 2025-05-25 PROCEDURE — 82140 ASSAY OF AMMONIA: CPT | Performed by: PHYSICIAN ASSISTANT

## 2025-05-25 PROCEDURE — 97116 GAIT TRAINING THERAPY: CPT | Mod: GP

## 2025-05-25 PROCEDURE — 83735 ASSAY OF MAGNESIUM: CPT | Performed by: HOSPITALIST

## 2025-05-25 PROCEDURE — 250N000011 HC RX IP 250 OP 636: Performed by: HOSPITALIST

## 2025-05-25 PROCEDURE — 36415 COLL VENOUS BLD VENIPUNCTURE: CPT | Performed by: HOSPITALIST

## 2025-05-25 PROCEDURE — 250N000013 HC RX MED GY IP 250 OP 250 PS 637: Performed by: STUDENT IN AN ORGANIZED HEALTH CARE EDUCATION/TRAINING PROGRAM

## 2025-05-25 PROCEDURE — 250N000012 HC RX MED GY IP 250 OP 636 PS 637: Performed by: STUDENT IN AN ORGANIZED HEALTH CARE EDUCATION/TRAINING PROGRAM

## 2025-05-25 RX ORDER — MAGNESIUM SULFATE HEPTAHYDRATE 40 MG/ML
4 INJECTION, SOLUTION INTRAVENOUS ONCE
Status: COMPLETED | OUTPATIENT
Start: 2025-05-25 | End: 2025-05-25

## 2025-05-25 RX ORDER — THERA TABS 400 MCG
1 TAB ORAL DAILY
Status: DISCONTINUED | OUTPATIENT
Start: 2025-05-26 | End: 2025-06-02 | Stop reason: HOSPADM

## 2025-05-25 RX ADMIN — CARVEDILOL 25 MG: 25 TABLET, FILM COATED ORAL at 08:47

## 2025-05-25 RX ADMIN — GABAPENTIN 900 MG: 300 CAPSULE ORAL at 08:46

## 2025-05-25 RX ADMIN — PANTOPRAZOLE SODIUM 40 MG: 40 TABLET, DELAYED RELEASE ORAL at 21:48

## 2025-05-25 RX ADMIN — CLONIDINE HYDROCHLORIDE 0.1 MG: 0.1 TABLET ORAL at 06:59

## 2025-05-25 RX ADMIN — PREDNISOLONE SODIUM PHOSPHATE 40 MG: 15 SOLUTION ORAL at 08:48

## 2025-05-25 RX ADMIN — PANTOPRAZOLE SODIUM 40 MG: 40 TABLET, DELAYED RELEASE ORAL at 08:47

## 2025-05-25 RX ADMIN — GABAPENTIN 900 MG: 300 CAPSULE ORAL at 02:02

## 2025-05-25 RX ADMIN — LACTULOSE 20 G: 20 SOLUTION ORAL at 08:47

## 2025-05-25 RX ADMIN — MAGNESIUM SULFATE HEPTAHYDRATE 4 G: 40 INJECTION, SOLUTION INTRAVENOUS at 14:02

## 2025-05-25 RX ADMIN — SPIRONOLACTONE 25 MG: 25 TABLET ORAL at 08:46

## 2025-05-25 RX ADMIN — FOLIC ACID 1 MG: 1 TABLET ORAL at 08:46

## 2025-05-25 RX ADMIN — CLONIDINE HYDROCHLORIDE 0.1 MG: 0.1 TABLET ORAL at 14:01

## 2025-05-25 RX ADMIN — CARVEDILOL 25 MG: 25 TABLET, FILM COATED ORAL at 17:57

## 2025-05-25 RX ADMIN — GABAPENTIN 900 MG: 300 CAPSULE ORAL at 17:57

## 2025-05-25 RX ADMIN — CLONIDINE HYDROCHLORIDE 0.1 MG: 0.1 TABLET ORAL at 21:47

## 2025-05-25 RX ADMIN — Medication 1 TABLET: at 08:46

## 2025-05-25 RX ADMIN — THIAMINE HYDROCHLORIDE 250 MG: 100 INJECTION, SOLUTION INTRAMUSCULAR; INTRAVENOUS at 08:47

## 2025-05-25 RX ADMIN — HYDROCORTISONE: 10 CREAM TOPICAL at 08:48

## 2025-05-25 ASSESSMENT — ACTIVITIES OF DAILY LIVING (ADL)
ADLS_ACUITY_SCORE: 47
NUMBER_OF_TIMES_PATIENT_HAS_FALLEN_WITHIN_LAST_SIX_MONTHS: 2
DOING_ERRANDS_INDEPENDENTLY_DIFFICULTY: YES
ADLS_ACUITY_SCORE: 47
ADLS_ACUITY_SCORE: 61
ADLS_ACUITY_SCORE: 61
ADLS_ACUITY_SCORE: 47
HEARING_DIFFICULTY_OR_DEAF: NO
DIFFICULTY_COMMUNICATING: NO
ADLS_ACUITY_SCORE: 47
ADLS_ACUITY_SCORE: 61
ADLS_ACUITY_SCORE: 47
EQUIPMENT_CURRENTLY_USED_AT_HOME: CANE, STRAIGHT
ADLS_ACUITY_SCORE: 61
CHANGE_IN_FUNCTIONAL_STATUS_SINCE_ONSET_OF_CURRENT_ILLNESS/INJURY: YES
ADLS_ACUITY_SCORE: 47
ADLS_ACUITY_SCORE: 47
ADLS_ACUITY_SCORE: 61
TOILETING_ISSUES: NO
CONCENTRATING,_REMEMBERING_OR_MAKING_DECISIONS_DIFFICULTY: YES
ADLS_ACUITY_SCORE: 61
ADLS_ACUITY_SCORE: 47
ADLS_ACUITY_SCORE: 46
FALL_HISTORY_WITHIN_LAST_SIX_MONTHS: YES
WALKING_OR_CLIMBING_STAIRS_DIFFICULTY: YES
ADLS_ACUITY_SCORE: 61
WALKING_OR_CLIMBING_STAIRS: AMBULATION DIFFICULTY, ASSISTANCE 1 PERSON
ADLS_ACUITY_SCORE: 61
ADLS_ACUITY_SCORE: 47
DRESSING/BATHING_DIFFICULTY: NO
ADLS_ACUITY_SCORE: 47
WEAR_GLASSES_OR_BLIND: YES
ADLS_ACUITY_SCORE: 47
ADLS_ACUITY_SCORE: 61
DIFFICULTY_EATING/SWALLOWING: NO

## 2025-05-25 NOTE — PROGRESS NOTES
[facility]    Medicine Progress Note - Hospitalist Service    Date of Admission:  5/22/2025    Assessment & Plan     Rakesh Carey is a 55 year old male admitted on 5/22/2025.      Alcoholic Hepatitis  Alcoholic Cirrhosis, new diagnosis with Anasarca  Alcoholic Ketoacidosis  Hypoalbuminemia  Obesity  Deconditioning  Hepatic Encephalopathy  MELD 19  CT showed Hepatic steatosis, hepatomegaly, early cirrhosis, and small volume ascites   --GI consult: Input appreciated  --LFTs trending low.  -- Continue to hold statin in setting of liver injury.  -- Continue prednisolone 40mg daily, GI managing.  -- Continue Aldactone and Lasix   --PT eval: Home with home PT  --Fluid restriction  -- Started on lactulose BID    Norovirus infection  -- Continue supportive care.  -- Diarrhea improving.      Distended gallbladder with gallbladder sludge and mild wall thickening   Hemorroidal rectal bleeding  No RUQ tenderness, ALT wnl at 62 while  likely from ETOH use  In regards to Vomiting/diarrhea, could be enteritis vs related to the gallbladder issues  --GI consulted: input appreciated.   --rectal sample without C diff  --Antiemetics in place,   --Hemorroid cream       LARISA  Likely 2/2 renal congestion  --IV Lasix ordered and PO aldactone started.  --Lasix held due to rising creatinine.  Plan to restart on 5/26  --cont to monitor creatinine     Alcohol Use w/ active Wtihdrawal  GERD  Last drink 1 day PTA. No hx prior withdrawal  --thiamine/folate/gabapentin/clonidine ordered in conjugction with CIWA protocol  --CD ordered per patient preference. He initially declined services; now agrees to rehab so they have been re-consulted.   --resumed PTA PPI     Hypomagnesemia  Hypokalemia  --telemetry  --RN based repletion ordered  --Hold PTA hydrochlorothiazide     Anemia  Thrombocytopenia  Likely 2/2 liver disease  --trend Hb/plts  --anemia workup     HTN  HLD  --hold statin given hepatitis  --resumed coreg  "but not hydrochlorothiazide given hypokalemia     5 cm complex left renal cyst  --outpatient follow-up w/ renal mass CT     Chronic back pain   Lumbar spolndylosis W/ radiculopathy  S/p B/l L5-S1 TFESI 1/2025  --max tylenol 2g  --Low dose oxy only given cirrhosis        Diet: Combination Diet Low Saturated Fat Na <2400mg Diet, No Caffeine Diet  Fluid restriction 1800 ML FLUID    DVT Prophylaxis: Pneumatic Compression Devices  Infante Catheter: Not present  Lines: None     Cardiac Monitoring: ACTIVE order. Indication: Electrolyte Imbalance (24 hours)- Magnesium <1.3 mg/ml; Potassium < =2.8 or > 5.5 mg/ml  Code Status: Full Code      Clinically Significant Risk Factors        # Hypokalemia: Lowest K = 3.2 mmol/L in last 2 days, will replace as needed      # Hypomagnesemia: Lowest Mg = 1.6 mg/dL in last 2 days, will replace as needed   # Hypoalbuminemia: Lowest albumin = 2.6 g/dL at 5/23/2025  6:55 AM, will monitor as appropriate    # Coagulation Defect: INR = 1.58 (Ref range: 0.85 - 1.15) and/or PTT = N/A, will monitor for bleeding    # Hypertension: Noted on problem list            # Morbid Obesity: Estimated body mass index is 46.37 kg/m  as calculated from the following:    Height as of this encounter: 1.778 m (5' 10\").    Weight as of this encounter: 146.6 kg (323 lb 3.1 oz)., PRESENT ON ADMISSION            Social Drivers of Health    Tobacco Use: Medium Risk (8/13/2024)    Patient History     Smoking Tobacco Use: Former     Smokeless Tobacco Use: Never   Physical Activity: Inactive (8/13/2024)    Exercise Vital Sign     Days of Exercise per Week: 0 days     Minutes of Exercise per Session: 0 min   Stress: Stress Concern Present (8/13/2024)    [facility] - Occupational Stress Questionnaire     Feeling of Stress : Rather much   Social Connections: Unknown (8/13/2024)    Social Connection and Isolation Panel [NHANES]     Frequency of Social Gatherings with Friends and Family: Three times " a week          Disposition Plan     Medically Ready for Discharge: Anticipated in 2-4 Days             Dot Shafer MD  Hospitalist Service  [facility]  Securely message with [employer] (more info)  Text page via [employer] Paging/Directory   ______________________________________________________________________    Interval History   Patient laying in bed, notes he still feels foggy.      Physical Exam   Vital Signs: Temp: 97.7  F (36.5  C) Temp src: Oral BP: 131/84 Pulse: 85   Resp: 18 SpO2: 95 % O2 Device: None (Room air)    Weight: 323 lbs 3.11 oz    General Appearance: Well appearing for stated age.  Respiratory: CTAB, no rales or ronchi  Cardiovascular: S1, S2 normal, no murmurs  GI: non-tender on palpation, BS present      Medical Decision Making       55 MINUTES SPENT BY ME on the date of service doing chart review, history, exam, documentation & further activities per the note.      Data

## 2025-05-25 NOTE — PROGRESS NOTES
"CLINICAL NUTRITION SERVICES - ASSESSMENT NOTE    Registered Dietitian Interventions:  - Continue diet and fluid restriction as recommended by MD.  - Changed to multivitamin without minerals in setting of T bili >2.       REASON FOR ASSESSMENT  Positive admission nutrition risk screen    INFORMATION OBTAINED  Assessed patient in room.    NUTRITION HISTORY  Presenting with 2-week hx of reduced appetite and 1-week hx of vomiting, chills and diarrhea, found to have norovirus. Notable ETOH consumption of 1 pint a day, has not gone without ETOH for more than 1 day in the past 6 months.     Pt reports poor oral intake for the past year. For example, he may go 2-3 days without eating. When he would have a meal, it might be tomato soup or a tuna salad sandwich. His PO intake has been largely displaced by ETOH. Subsequently reports 60 lb weight loss.     CURRENT NUTRITION ORDERS  Diet: 1800 mL Fluid Restriction and Low Saturated Fat/2400 mg Sodium    CURRENT INTAKE/TOLERANCE  Great appetite. Eating 100% of meals three times per day.      LABS  T bili 3.2   Mg 1.4 - presume related to GI output     MEDICATIONS  Folic acid 1 mg   IV Thiamine 250 mg    Multivitamin with minerals daily     ANTHROPOMETRICS  Height: 177.8 cm (5' 10\")  Admission Weight: (!) 146.6 kg (323 lb 3.1 oz) (05/23/25 1008)   Most Recent Weight: (!) 146.6 kg (323 lb 3.1 oz) (05/23/25 1008)  IBW: 75.5 kg  BMI: Body mass index is 46.37 kg/m .   Weight History: Down 37 lbs (10%) in the past 9 months   Wt Readings from Last 10 Encounters:   05/23/25 (!) 146.6 kg (323 lb 3.1 oz)   08/13/24 (!) 163.3 kg (360 lb)   12/26/23 (!) 169.2 kg (373 lb)   06/19/23 (!) 169 kg (372 lb 9.2 oz)   03/06/23 (!) 152.9 kg (337 lb)   07/29/22 (!) 166 kg (366 lb)   06/13/22 (!) 166 kg (366 lb)   06/01/22 (!) 163.9 kg (361 lb 6.4 oz)   04/16/22 (!) 154.7 kg (341 lb)   08/19/21 (!) 155 kg (341 lb 11.2 oz)       Dosing Weight: 93 kg, based on adjusted wt (IBW of 75.5 kg and admit wt of " 146.6 kg)     ASSESSED NUTRITION NEEDS  Estimated Energy Needs: 2110-1276 kcals/day (20 - 25 kcals/kg)  Justification: Obese  Estimated Protein Needs: 140-185 grams protein/day (1.5 - 2 grams of pro/kg)  Justification: Increased needs  Estimated Fluid Needs: 1800 ml/day   Justification: Fluid restriction     SYSTEM AND PHYSICAL FINDINGS    GI symptoms: Diarrhea (related to norovirus, now on lactulose)     MALNUTRITION  % Intake: </=75% for >/= 1 month (severe)  % Weight Loss: Weight loss does not meet criteria   Subcutaneous Fat Loss: None observed  Muscle Loss: None observed  Fluid Accumulation/Edema: None noted  Malnutrition Diagnosis: Patient does not meet two of the established criteria necessary for diagnosing malnutrition  Malnutrition Present on Admission: No    NUTRITION DIAGNOSIS  Unintended weight loss related to inadequate oral intake in setting of chronic ETOH consumption and acute illness as evidenced by 10% wt loss in past 9 months.     INTERVENTIONS  See nutrition interventions above    GOALS  Patient to consume % of nutritionally adequate meal trays TID, or the equivalent with supplements/snacks.     MONITORING/EVALUATION  Progress toward goals will be monitored and evaluated per policy.    Vanesa Cali, RD, LD, CNSC   Available on [employer]

## 2025-05-25 NOTE — PROGRESS NOTES
PROGRESS NOTE    ASSESSMENT AND PLAN: 55 year old male with a past medical history of peptic ulcer disease, GERD, diverticulitis, anxiety, tobacco use, hypertension, and TBI who presents with progressive whole-body weakness/decreased appetite for 2 weeks.  GI consulted for EtOH hepatitis.  -LFT quite consistent with EtOH pattern with ALT 62, , total bilirubin 3.8, and alkaline phosphatase 286. Ferritin level elevated 2432, iron index 88% so we will need to follow-up as outpatient for hemochromatosis but this is likely due to acute phase reactant at this time  -Patient complain about nausea and vomiting, and found to have norovirus  -Madrey score 32, continue with prednisone 40 mg daily with weekly 10 mg taper  -Patient also has underlying liver cirrhosis given ascites around the liver with low platelet count  -Patient also complains about memory issue, with underlying liver cirrhosis, concern for hepatic encephalopathy, hence recommend to check ammonia level, if elevated, can start lactulose twice a day  -5/25/25- Daily LFT with INR (ordered)  - Ammonia level from this AM is pending   - IV Lasix on hold  - Continue Aldactone 25 mg  - Continue lactulose  - Continue prednisolone  - continue supportive care to include IV fluids, analgesics and antiemetics  -Can do outpatient Fibroscan for further staging of liver cirrhosis  -Recommend MINDS protocol  -Recommend LADC consult as patient is interested in alcohol cessation  -GI will continue to follow as inpatient    INTERIM VISIT: (admission day 3): Patient having numerous loose stools since starting lactulose. He reports he still feels some short-term memory issues but improving slowly. LFTs trending down. Ammonia level pending this AM.     PHYSICAL EXAM:  Vital Signs: Temp: 97.7  F (36.5  C) Temp src: Oral BP: 129/78 Pulse: 85   Resp: 18 SpO2: 95 % O2 Device: None (Room air)    Weight: 323 lbs 3.11 oz      Recent Labs   Lab Test 05/24/25  1539 05/24/25  3881  05/23/25  1527 05/23/25  0655   POTASSIUM 3.5 3.2*  3.2*   < > 2.7*   CHLORIDE  --  100  --  105   BUN  --  8.1  --  8.0    < > = values in this interval not displayed.       Recent Labs   Lab Test 05/24/25  0436 05/23/25  0655 05/22/25 2050 05/22/25 1957   INR 1.58*  --  1.56*  --    WBC  --  6.7  --  6.6   HGB  --  10.5*  --  11.8*   PLT  --  64*  --  72*     Active Problems:    Severe obesity (BMI >= 40) (H)    Hypokalemia    Hypomagnesemia    Generalized weakness    Acute liver failure without hepatic coma      ADDITIONAL COMMENTS:    I reviewed all medications, new labs and imaging results over the last 24 hours.   I discussed the patient's progress with Dr. Katie Box, MSPAS, [facility]   [phone] Office  [phone] Cell

## 2025-05-25 NOTE — PLAN OF CARE
Goal Outcome Evaluation:         Summary:  Vomiting, Diarrhea, Weakness, (+) Norovirus, ETOH Use    DATE & TIME: 5/24/25-5/25/25  23:00-07:30      Cognitive Concerns/ Orientation : A&O x4, forgetful, calm and pleasant  BEHAVIOR & AGGRESSION TOOL COLOR: Green  CIWA SCORE: 3, 1 (tremors)- no PRN meds given  ABNL VS/O2: VSS on RA  MOBILITY: SBA with FWW  PAIN MANAGMENT: 3/10 intermittent abdominal & back pain-declines intervention, relieved with repositioning  DIET: Low Saturated Fat 2g NA, No Caffeine Diet FR 1800ml  BOWEL/BLADDER: continent , up to BR, 3x LBM this shift  ABNL LAB/BG: K+ &  Mg protocol, Ammonia 123  DRAIN/DEVICES: L PIV-SL  TELEMETRY RHYTHM: NSR  SKIN: scattered bruises, galindo BLE  TESTS/PROCEDURES: none this shift  D/C DAY/GOALS/PLACE: pending  OTHER IMPORTANT INFO: ChemDep/GI following. PT rec home with home PT

## 2025-05-25 NOTE — PLAN OF CARE
Goal Outcome Evaluation:      Plan of Care Reviewed With: patient    Overall Patient Progress: improvingOverall Patient Progress: improving     Orientation: AOx4    Vitals/Tele: VSS , on room air. NSR    IV Access/drains: PIV SL    Diet: 2 gm NA, 1800 fluid restriction    Mobility: x1 +GBW    GI/: Continent    Wound/Skin: R. Hip and R butt bruising    Consults: Chem dependency    Discharge Plan: TBD      See Flow sheets for assessment

## 2025-05-26 LAB
ALBUMIN SERPL BCG-MCNC: 2.7 G/DL (ref 3.5–5.2)
ALP SERPL-CCNC: 214 U/L (ref 40–150)
ALT SERPL W P-5'-P-CCNC: 52 U/L (ref 0–70)
AST SERPL W P-5'-P-CCNC: 140 U/L (ref 0–45)
BILIRUB DIRECT SERPL-MCNC: 1.79 MG/DL (ref 0–0.3)
BILIRUB SERPL-MCNC: 2.6 MG/DL
INR PPP: 1.56 (ref 0.85–1.15)
MAGNESIUM SERPL-MCNC: 1.7 MG/DL (ref 1.7–2.3)
POTASSIUM SERPL-SCNC: 4.3 MMOL/L (ref 3.4–5.3)
PROT SERPL-MCNC: 6.7 G/DL (ref 6.4–8.3)
PROTHROMBIN TIME: 18.2 SECONDS (ref 11.8–14.8)

## 2025-05-26 PROCEDURE — 250N000012 HC RX MED GY IP 250 OP 636 PS 637: Performed by: STUDENT IN AN ORGANIZED HEALTH CARE EDUCATION/TRAINING PROGRAM

## 2025-05-26 PROCEDURE — 120N000001 HC R&B MED SURG/OB

## 2025-05-26 PROCEDURE — 250N000013 HC RX MED GY IP 250 OP 250 PS 637: Performed by: HOSPITALIST

## 2025-05-26 PROCEDURE — 250N000011 HC RX IP 250 OP 636: Performed by: STUDENT IN AN ORGANIZED HEALTH CARE EDUCATION/TRAINING PROGRAM

## 2025-05-26 PROCEDURE — 36415 COLL VENOUS BLD VENIPUNCTURE: CPT | Performed by: PHYSICIAN ASSISTANT

## 2025-05-26 PROCEDURE — 250N000011 HC RX IP 250 OP 636: Performed by: HOSPITALIST

## 2025-05-26 PROCEDURE — 84132 ASSAY OF SERUM POTASSIUM: CPT | Performed by: HOSPITALIST

## 2025-05-26 PROCEDURE — 84450 TRANSFERASE (AST) (SGOT): CPT | Performed by: PHYSICIAN ASSISTANT

## 2025-05-26 PROCEDURE — 85610 PROTHROMBIN TIME: CPT | Performed by: PHYSICIAN ASSISTANT

## 2025-05-26 PROCEDURE — 84155 ASSAY OF PROTEIN SERUM: CPT | Performed by: PHYSICIAN ASSISTANT

## 2025-05-26 PROCEDURE — 99232 SBSQ HOSP IP/OBS MODERATE 35: CPT | Performed by: STUDENT IN AN ORGANIZED HEALTH CARE EDUCATION/TRAINING PROGRAM

## 2025-05-26 PROCEDURE — 250N000013 HC RX MED GY IP 250 OP 250 PS 637: Performed by: STUDENT IN AN ORGANIZED HEALTH CARE EDUCATION/TRAINING PROGRAM

## 2025-05-26 PROCEDURE — 83735 ASSAY OF MAGNESIUM: CPT | Performed by: INTERNAL MEDICINE

## 2025-05-26 RX ORDER — MAGNESIUM SULFATE HEPTAHYDRATE 40 MG/ML
2 INJECTION, SOLUTION INTRAVENOUS ONCE
Status: COMPLETED | OUTPATIENT
Start: 2025-05-26 | End: 2025-05-26

## 2025-05-26 RX ADMIN — PANTOPRAZOLE SODIUM 40 MG: 40 TABLET, DELAYED RELEASE ORAL at 07:45

## 2025-05-26 RX ADMIN — THIAMINE HYDROCHLORIDE 250 MG: 100 INJECTION, SOLUTION INTRAMUSCULAR; INTRAVENOUS at 07:44

## 2025-05-26 RX ADMIN — PREDNISOLONE SODIUM PHOSPHATE 40 MG: 15 SOLUTION ORAL at 07:53

## 2025-05-26 RX ADMIN — GABAPENTIN 600 MG: 300 CAPSULE ORAL at 07:45

## 2025-05-26 RX ADMIN — MAGNESIUM SULFATE HEPTAHYDRATE 2 G: 40 INJECTION, SOLUTION INTRAVENOUS at 08:48

## 2025-05-26 RX ADMIN — GABAPENTIN 600 MG: 300 CAPSULE ORAL at 16:33

## 2025-05-26 RX ADMIN — LACTULOSE 20 G: 20 SOLUTION ORAL at 07:44

## 2025-05-26 RX ADMIN — GABAPENTIN 900 MG: 300 CAPSULE ORAL at 00:43

## 2025-05-26 RX ADMIN — PANTOPRAZOLE SODIUM 40 MG: 40 TABLET, DELAYED RELEASE ORAL at 20:48

## 2025-05-26 RX ADMIN — FUROSEMIDE 10 MG: 10 INJECTION, SOLUTION INTRAVENOUS at 07:44

## 2025-05-26 RX ADMIN — CARVEDILOL 25 MG: 25 TABLET, FILM COATED ORAL at 17:53

## 2025-05-26 RX ADMIN — MULTIVITAMIN TABLET 1 TABLET: TABLET at 07:45

## 2025-05-26 RX ADMIN — CLONIDINE HYDROCHLORIDE 0.1 MG: 0.1 TABLET ORAL at 05:52

## 2025-05-26 RX ADMIN — CARVEDILOL 25 MG: 25 TABLET, FILM COATED ORAL at 07:45

## 2025-05-26 RX ADMIN — SPIRONOLACTONE 25 MG: 25 TABLET ORAL at 07:45

## 2025-05-26 RX ADMIN — CLONIDINE HYDROCHLORIDE 0.1 MG: 0.1 TABLET ORAL at 21:24

## 2025-05-26 RX ADMIN — CLONIDINE HYDROCHLORIDE 0.1 MG: 0.1 TABLET ORAL at 13:56

## 2025-05-26 RX ADMIN — FOLIC ACID 1 MG: 1 TABLET ORAL at 07:45

## 2025-05-26 ASSESSMENT — ACTIVITIES OF DAILY LIVING (ADL)
ADLS_ACUITY_SCORE: 46
ADLS_ACUITY_SCORE: 51
ADLS_ACUITY_SCORE: 46
ADLS_ACUITY_SCORE: 51
ADLS_ACUITY_SCORE: 46
ADLS_ACUITY_SCORE: 51
ADLS_ACUITY_SCORE: 46
ADLS_ACUITY_SCORE: 51
ADLS_ACUITY_SCORE: 46
ADLS_ACUITY_SCORE: 46

## 2025-05-26 NOTE — PROGRESS NOTES
Met with pt for CD Consult and introduced self and role in pt's care.  Inquired about pt's interest in GABY Assessment for referrals to Tx or any additional community resources.  Pt indicated he was interested in completing a GABY Assessment for referrals to Tx, but requested this writer contact him tomorrow to complete.  This writer relayed to pt that they will contact him tomorrow AM, pt verbalized appreciation.    If pt discharges prior to being seen and has active insurance they may also schedule an assessment on an outpatient basis by calling [facility] at [phone].     LEIGH ANN Garza, [facility]  Substance Use Disorder Evaluation Counselor  Email: [email or web address]

## 2025-05-26 NOTE — PLAN OF CARE
Summary: Vomiting, Diarrhea, Weakness, (+) Norovirus, ETOH Use     Date & Time: 5/25/25 2430-9394  Orientation: Alert/oriented x 4, can be forgetful  Activity Level: SBA with walker   CIWA: 0,0  Fall Risk: Yes   Behavior & Aggression: Green   Pain Management: Scheduled gabapentin-declines other intervention  ABNL VS/O2:VSS, room air  Tele: NSR   ABNL Lab/BG:  Ammonia 74, Mag (replaced via IV) 1.8; alb 2.8; alk phos 257; ; NH3 74 (down from 123), Bili direct 2.04  Diet: Lo saturated fat + < 2400 mg sodium + no caffeine + Fluid restriction 1800 ml   Bowel/Bladder: Continent, 2 BMs overnight  Skin:  Large bruise on right hip, galindo BLE, scab to L knee  Drains/Devices: R PIV saline locked  Tests/Procedures: None   Anticipated DC Date: Pending   Other Important Info: Enteric precautions maintained for Norovirus (5/23/25); GI/SW/PT following; pt declined chem dep treatment

## 2025-05-26 NOTE — PLAN OF CARE
Goal Outcome Evaluation:    Summary: Vomiting, Diarrhea, Weakness, (+) Norovirus, ETOH Use     Date & Time: 5/26/25 0387-5323  Orientation: Alert/oriented x 4, can be forgetful  Activity Level: SBA with walker   CIWA: 0,0  Fall Risk: Yes   Behavior & Aggression: Green   Pain Management: Scheduled gabapentin-declines other intervention  ABNL VS/O2: VSS on room air  Tele: NSR   ABNL Lab/BG:  Ammonia 74, Mag 1.7 replaced IV, recheck in AM, , Bili direct 1.79  Diet: Lo saturated fat + < 2400 mg sodium + no caffeine + Fluid restriction 1800 ml, good appetite  Bowel/Bladder: Continent, BM X3  Skin:  Large bruise on right hip, galindo BLE, scab to L knee  Drains/Devices: R PIV saline locked  Tests/Procedures: None   Anticipated DC Date: Pending   Other Important Info: Enteric precautions maintained for Norovirus (5/23/25); GI/SW/PT following; Chem dep. Re-consulted. Per MD hold evening lactulose due to patient already having >2 BM's today

## 2025-05-26 NOTE — PLAN OF CARE
Goal Outcome Evaluation:      Summary: Vomiting, Diarrhea, Weakness, (+) Norovirus, ETOH Use    Date & Time: 5/25/25 2799-7454   Orientation:  A&Ox4, forgetful  Activity Level: SBA with walker   CIWA: 0,0  Fall Risk: Yes   Behavior & Aggression: Green   Pain Management: intermittent abdominal & back pain-declines intervention  ABNL VS/O2: VSS on RA   Tele: NSR   ABNL Lab/BG:  Ammonia 74, Mag 1.3 replaced IV , , Hgb 11, Bili direct 2.04  Diet: Cardiac diet, FR 1800 ml   Bowel/Bladder: Continent    Skin:  Large bruise on right hip, galindo BLE  Drains/Devices: L PIV SL   Tests/Procedures: None   Anticipated DC Date: Pending   Other Important Info: GI, CD following

## 2025-05-26 NOTE — PROGRESS NOTES
[facility]    Medicine Progress Note - Hospitalist Service    Date of Admission:  5/22/2025    Assessment & Plan      Alcoholic Hepatitis  Alcoholic Cirrhosis, new diagnosis with Anasarca  Alcoholic Ketoacidosis  Hypoalbuminemia  Obesity  Deconditioning  Hepatic Encephalopathy  MELD 19, Madrey score 32,   CT showed Hepatic steatosis, hepatomegaly, early cirrhosis, and small volume ascites   --GI consult: Input appreciated  --LFTs trending low.  -- Continue to hold statin in setting of liver injury.  -- Continue prednisolone 40mg daily, GI managing.  -- Continue Aldactone 25 mg and Lasix 10mg   --PT eval: Home with home PT  --Fluid restriction  -- lactulose BID     Norovirus infection  -- Continue supportive care.  -- Diarrhea improving.      Distended gallbladder with gallbladder sludge and mild wall thickening   Hemorroidal rectal bleeding  No RUQ tenderness, ALT wnl at 62 while  likely from ETOH use  In regards to Vomiting/diarrhea, could be enteritis vs related to the gallbladder issues  --GI consulted: input appreciated.   --rectal sample without C diff  --Antiemetics in place,   --Hemorroid cream        LARISA  Likely 2/2 renal congestion  --IV Lasix ordered and PO aldactone started.  --Lasix held due to rising creatinine.  Plan to restart on 5/26  --cont to monitor creatinine       Alcohol Use w/ active Wtihdrawal  GERD  Last drink 1 day PTA. No hx prior withdrawal  --thiamine/folate/gabapentin/clonidine ordered in conjugction with [facility] protocol  --CD ordered per patient preference. He initially declined services; now agrees to rehab so they have been re-consulted.   --resumed PTA PPI  -Not scoring currently on [facility]      Hypomagnesemia  Hypokalemia  --telemetry  --RN based repletion ordered  --Hold PTA hydrochlorothiazide     Anemia  Thrombocytopenia  Likely 2/2 liver disease  --trend Hb/plts  --anemia workup     HTN  HLD  --hold statin given hepatitis  --resumed coreg but not  "hydrochlorothiazide given hypokalemia     5 cm complex left renal cyst  --outpatient follow-up w/ renal mass CT     Chronic back pain   Lumbar spolndylosis W/ radiculopathy  S/p B/l L5-S1 TFESI 1/2025  --max tylenol 2g  --Low dose oxy only given cirrhosis           Diet: Combination Diet Low Saturated Fat Na <2400mg Diet, No Caffeine Diet  Fluid restriction 1800 ML FLUID    DVT Prophylaxis: Pneumatic Compression Devices  Infante Catheter: Not present  Lines: None     Cardiac Monitoring: ACTIVE order. Indication: Electrolyte Imbalance (24 hours)- Magnesium <1.3 mg/ml; Potassium < =2.8 or > 5.5 mg/ml  Code Status: Full Code      Clinically Significant Risk Factors         # Hyponatremia: Lowest Na = 134 mmol/L in last 2 days, will monitor as appropriate     # Hypomagnesemia: Lowest Mg = 1.3 mg/dL in last 2 days, will replace as needed   # Hypoalbuminemia: Lowest albumin = 2.6 g/dL at 5/23/2025  6:55 AM, will monitor as appropriate  # Coagulation Defect: INR = 1.56 (Ref range: 0.85 - 1.15) and/or PTT = N/A, will monitor for bleeding  # Thrombocytopenia: Lowest platelets = 63 in last 2 days, will monitor for bleeding   # Hypertension: Noted on problem list            # Morbid Obesity: Estimated body mass index is 47.01 kg/m  as calculated from the following:    Height as of this encounter: 1.778 m (5' 10\").    Weight as of this encounter: 148.6 kg (327 lb 9.7 oz)., PRESENT ON ADMISSION            Social Drivers of Health    Tobacco Use: Medium Risk (8/13/2024)    Patient History     Smoking Tobacco Use: Former     Smokeless Tobacco Use: Never   Physical Activity: Inactive (8/13/2024)    Exercise Vital Sign     Days of Exercise per Week: 0 days     Minutes of Exercise per Session: 0 min   Stress: Stress Concern Present (8/13/2024)    [employer] - Occupational Stress Questionnaire     Feeling of Stress : Rather much   Social Connections: Unknown (8/13/2024)    Social Connection and Isolation " Panel [NHANES]     Frequency of Social Gatherings with Friends and Family: Three times a week          Disposition Plan     Medically Ready for Discharge: Anticipated in 2-4 Days             Marilyn Jules MD  Hospitalist Service  [facility]  Securely message with [employer] (more info)  Text page via [employer] Paging/Directory   ______________________________________________________________________    Interval History   Patient seen and examined at bedside.  No acute overnight events.  Does not have any chest pain or shortness of breath.  Has bleeding in the stools which has been chronic due to hemorrhoidal bleeding as per patient.    Physical Exam   Vital Signs: Temp: 98  F (36.7  C) Temp src: Oral BP: 116/84 Pulse: 82   Resp: 16 SpO2: 95 % O2 Device: None (Room air)    Weight: 327 lbs 9.66 oz    Physical Exam  Cardiovascular:      Rate and Rhythm: Normal rate and regular rhythm.   Pulmonary:      Effort: Pulmonary effort is normal. No respiratory distress.      Breath sounds: Normal breath sounds. No stridor.   Abdominal:      General: There is no distension.      Palpations: Abdomen is soft.      Tenderness: There is no abdominal tenderness.          Medical Decision Making       35 MINUTES SPENT BY ME on the date of service doing chart review, history, exam, documentation & further activities per the note.      Data     I have personally reviewed the following data over the past 24 hrs:    N/A  \   N/A   / N/A     N/A N/A N/A /  N/A   4.3 N/A N/A \     ALT: 52 AST: 140 (H) AP: 214 (H) TBILI: 2.6 (H)   ALB: 2.7 (L) TOT PROTEIN: 6.7 LIPASE: N/A     INR:  1.56 (H) PTT:  N/A   D-dimer:  N/A Fibrinogen:  N/A       Imaging results reviewed over the past 24 hrs:   No results found for this or any previous visit (from the past 24 hours).

## 2025-05-27 ENCOUNTER — TELEPHONE (OUTPATIENT)
Dept: FAMILY MEDICINE | Facility: CLINIC | Age: 55
End: 2025-05-27

## 2025-05-27 ENCOUNTER — APPOINTMENT (OUTPATIENT)
Dept: PHYSICAL THERAPY | Facility: CLINIC | Age: 55
End: 2025-05-27
Payer: COMMERCIAL

## 2025-05-27 LAB
ALBUMIN SERPL BCG-MCNC: 2.8 G/DL (ref 3.5–5.2)
ALP SERPL-CCNC: 226 U/L (ref 40–150)
ALT SERPL W P-5'-P-CCNC: 52 U/L (ref 0–70)
ANION GAP SERPL CALCULATED.3IONS-SCNC: 7 MMOL/L (ref 7–15)
AST SERPL W P-5'-P-CCNC: 114 U/L (ref 0–45)
BILIRUB DIRECT SERPL-MCNC: 1.65 MG/DL (ref 0–0.3)
BILIRUB SERPL-MCNC: 2.6 MG/DL
BUN SERPL-MCNC: 9.9 MG/DL (ref 6–20)
CALCIUM SERPL-MCNC: 8.6 MG/DL (ref 8.8–10.4)
CHLORIDE SERPL-SCNC: 100 MMOL/L (ref 98–107)
CREAT SERPL-MCNC: 0.86 MG/DL (ref 0.67–1.17)
EGFRCR SERPLBLD CKD-EPI 2021: >90 ML/MIN/1.73M2
ERYTHROCYTE [DISTWIDTH] IN BLOOD BY AUTOMATED COUNT: 18.9 % (ref 10–15)
GLUCOSE SERPL-MCNC: 113 MG/DL (ref 70–99)
HCO3 SERPL-SCNC: 29 MMOL/L (ref 22–29)
HCT VFR BLD AUTO: 33 % (ref 40–53)
HGB BLD-MCNC: 10.9 G/DL (ref 13.3–17.7)
INR PPP: 1.48 (ref 0.85–1.15)
MAGNESIUM SERPL-MCNC: 1.8 MG/DL (ref 1.7–2.3)
MCH RBC QN AUTO: 33.2 PG (ref 26.5–33)
MCHC RBC AUTO-ENTMCNC: 33 G/DL (ref 31.5–36.5)
MCV RBC AUTO: 101 FL (ref 78–100)
PLATELET # BLD AUTO: 69 10E3/UL (ref 150–450)
POTASSIUM SERPL-SCNC: 4.4 MMOL/L (ref 3.4–5.3)
PROT SERPL-MCNC: 6.8 G/DL (ref 6.4–8.3)
PROTHROMBIN TIME: 17.5 SECONDS (ref 11.8–14.8)
RBC # BLD AUTO: 3.28 10E6/UL (ref 4.4–5.9)
SODIUM SERPL-SCNC: 136 MMOL/L (ref 135–145)
WBC # BLD AUTO: 5.3 10E3/UL (ref 4–11)

## 2025-05-27 PROCEDURE — 85041 AUTOMATED RBC COUNT: CPT | Performed by: STUDENT IN AN ORGANIZED HEALTH CARE EDUCATION/TRAINING PROGRAM

## 2025-05-27 PROCEDURE — 250N000013 HC RX MED GY IP 250 OP 250 PS 637: Performed by: STUDENT IN AN ORGANIZED HEALTH CARE EDUCATION/TRAINING PROGRAM

## 2025-05-27 PROCEDURE — 99232 SBSQ HOSP IP/OBS MODERATE 35: CPT | Performed by: STUDENT IN AN ORGANIZED HEALTH CARE EDUCATION/TRAINING PROGRAM

## 2025-05-27 PROCEDURE — 97116 GAIT TRAINING THERAPY: CPT | Mod: GP

## 2025-05-27 PROCEDURE — 250N000011 HC RX IP 250 OP 636: Performed by: HOSPITALIST

## 2025-05-27 PROCEDURE — 85048 AUTOMATED LEUKOCYTE COUNT: CPT | Performed by: STUDENT IN AN ORGANIZED HEALTH CARE EDUCATION/TRAINING PROGRAM

## 2025-05-27 PROCEDURE — 250N000013 HC RX MED GY IP 250 OP 250 PS 637: Performed by: HOSPITALIST

## 2025-05-27 PROCEDURE — 82040 ASSAY OF SERUM ALBUMIN: CPT | Performed by: PHYSICIAN ASSISTANT

## 2025-05-27 PROCEDURE — 120N000001 HC R&B MED SURG/OB

## 2025-05-27 PROCEDURE — 83735 ASSAY OF MAGNESIUM: CPT | Performed by: INTERNAL MEDICINE

## 2025-05-27 PROCEDURE — 97530 THERAPEUTIC ACTIVITIES: CPT | Mod: GP

## 2025-05-27 PROCEDURE — 85610 PROTHROMBIN TIME: CPT | Performed by: PHYSICIAN ASSISTANT

## 2025-05-27 PROCEDURE — 250N000012 HC RX MED GY IP 250 OP 636 PS 637: Performed by: STUDENT IN AN ORGANIZED HEALTH CARE EDUCATION/TRAINING PROGRAM

## 2025-05-27 PROCEDURE — 36415 COLL VENOUS BLD VENIPUNCTURE: CPT | Performed by: STUDENT IN AN ORGANIZED HEALTH CARE EDUCATION/TRAINING PROGRAM

## 2025-05-27 PROCEDURE — 250N000011 HC RX IP 250 OP 636: Performed by: STUDENT IN AN ORGANIZED HEALTH CARE EDUCATION/TRAINING PROGRAM

## 2025-05-27 RX ORDER — LACTULOSE 10 G/15ML
20 SOLUTION ORAL DAILY
Status: DISCONTINUED | OUTPATIENT
Start: 2025-05-28 | End: 2025-06-01

## 2025-05-27 RX ORDER — MAGNESIUM OXIDE 400 MG/1
400 TABLET ORAL EVERY 4 HOURS
Status: COMPLETED | OUTPATIENT
Start: 2025-05-27 | End: 2025-05-27

## 2025-05-27 RX ORDER — CLONIDINE HYDROCHLORIDE 0.1 MG/1
0.1 TABLET ORAL 2 TIMES DAILY
Status: DISCONTINUED | OUTPATIENT
Start: 2025-05-27 | End: 2025-05-30

## 2025-05-27 RX ADMIN — GABAPENTIN 600 MG: 300 CAPSULE ORAL at 08:23

## 2025-05-27 RX ADMIN — GABAPENTIN 600 MG: 300 CAPSULE ORAL at 17:23

## 2025-05-27 RX ADMIN — FOLIC ACID 1 MG: 1 TABLET ORAL at 08:23

## 2025-05-27 RX ADMIN — PANTOPRAZOLE SODIUM 40 MG: 40 TABLET, DELAYED RELEASE ORAL at 21:42

## 2025-05-27 RX ADMIN — CLONIDINE HYDROCHLORIDE 0.1 MG: 0.1 TABLET ORAL at 06:31

## 2025-05-27 RX ADMIN — CALCIUM CARBONATE (ANTACID) CHEW TAB 500 MG 1000 MG: 500 CHEW TAB at 14:02

## 2025-05-27 RX ADMIN — HYDROCORTISONE: 10 CREAM TOPICAL at 08:34

## 2025-05-27 RX ADMIN — CALCIUM CARBONATE (ANTACID) CHEW TAB 500 MG 1000 MG: 500 CHEW TAB at 06:37

## 2025-05-27 RX ADMIN — CARVEDILOL 25 MG: 25 TABLET, FILM COATED ORAL at 08:23

## 2025-05-27 RX ADMIN — Medication 400 MG: at 10:13

## 2025-05-27 RX ADMIN — CARVEDILOL 25 MG: 25 TABLET, FILM COATED ORAL at 17:23

## 2025-05-27 RX ADMIN — PANTOPRAZOLE SODIUM 40 MG: 40 TABLET, DELAYED RELEASE ORAL at 08:23

## 2025-05-27 RX ADMIN — MULTIVITAMIN TABLET 1 TABLET: TABLET at 08:22

## 2025-05-27 RX ADMIN — THIAMINE HYDROCHLORIDE 250 MG: 100 INJECTION, SOLUTION INTRAMUSCULAR; INTRAVENOUS at 08:22

## 2025-05-27 RX ADMIN — HYDROCORTISONE: 10 CREAM TOPICAL at 21:42

## 2025-05-27 RX ADMIN — LACTULOSE 20 G: 20 SOLUTION ORAL at 08:24

## 2025-05-27 RX ADMIN — FUROSEMIDE 10 MG: 10 INJECTION, SOLUTION INTRAVENOUS at 08:22

## 2025-05-27 RX ADMIN — CLONIDINE HYDROCHLORIDE 0.1 MG: 0.1 TABLET ORAL at 14:02

## 2025-05-27 RX ADMIN — OXYCODONE HYDROCHLORIDE 2.5 MG: 5 TABLET ORAL at 21:42

## 2025-05-27 RX ADMIN — OXYCODONE HYDROCHLORIDE 2.5 MG: 5 TABLET ORAL at 06:37

## 2025-05-27 RX ADMIN — CLONIDINE HYDROCHLORIDE 0.1 MG: 0.1 TABLET ORAL at 21:42

## 2025-05-27 RX ADMIN — SPIRONOLACTONE 25 MG: 25 TABLET ORAL at 08:23

## 2025-05-27 RX ADMIN — GABAPENTIN 600 MG: 300 CAPSULE ORAL at 00:30

## 2025-05-27 RX ADMIN — PREDNISOLONE SODIUM PHOSPHATE 40 MG: 15 SOLUTION ORAL at 08:33

## 2025-05-27 RX ADMIN — OXYCODONE HYDROCHLORIDE 2.5 MG: 5 TABLET ORAL at 10:13

## 2025-05-27 RX ADMIN — OXYCODONE HYDROCHLORIDE 2.5 MG: 5 TABLET ORAL at 17:23

## 2025-05-27 ASSESSMENT — ACTIVITIES OF DAILY LIVING (ADL)
DEPENDENT_IADLS:: INDEPENDENT
ADLS_ACUITY_SCORE: 46

## 2025-05-27 NOTE — PLAN OF CARE
Goal Outcome Evaluation:  Summary: Vomiting, Diarrhea, Weakness, (+) Norovirus, ETOH Use     Date & Time: 5/27/25  4468-8749  Orientation: Alert/oriented x 4, can be forgetful at times  Activity Level: AX1/ SBA with walker   CIWA: 0,0, 0  Fall Risk: Yes   Behavior & Aggression: Green   Pain Management: Oxycodone given x 2 for chronic back pain, Scheduled gabapentin  ABNL VS/O2:VSS, room air  Tele: NSR   ABNL Lab/BG: See labs results   Diet: Lo saturated fat + < 2400 mg sodium + no caffeine + Fluid restriction 1800 ml   Bowel/Bladder: Continent, 2 BMs  Skin:  Large bruise on right hip/flank, galindo BLE, scab to L knee  Drains/Devices: R PIV saline locked  Tests/Procedures: None   Anticipated DC Date: Pending   Other Important Info: GI/SW/PT following; pt now willing to seek treatment for EtOH   CD consulted today, pt expressed wanted to go to CD rehab, see notes.    - Infection Prevention removed isolation this afternoon, see notes.   - GI decrease lactulose, consider EGD and colonoscopy as outpatient  - US abdomen ordered pending tomorrow morning for mild abdominal distention  - Mg and K recheck ordered tomorrow am.

## 2025-05-27 NOTE — PLAN OF CARE
Summary: Vomiting, Diarrhea, Weakness, (+) Norovirus, ETOH Use     Date & Time: 5/26/25 4982-4948  Orientation: Alert/oriented x 4, can be forgetful  Activity Level: SBA with walker   CIWA: 0,0  Fall Risk: Yes   Behavior & Aggression: Green   Pain Management: Oxycodone given x 1 for chronic back pain; Scheduled gabapentin  ABNL VS/O2:VSS, room air  Tele: NSR   ABNL Lab/BG: alb 2.7; alk phos 214; ; Bili direct 1.79; inr 1.56; PT 18.2  Diet: Lo saturated fat + < 2400 mg sodium + no caffeine + Fluid restriction 1800 ml   Bowel/Bladder: Continent, 2 BMs overnight  Skin:  Large bruise on right hip/flank, galindo BLE, scab to L knee  Drains/Devices: R PIV saline locked  Tests/Procedures: None   Anticipated DC Date: Pending   Other Important Info: Enteric precautions maintained for Norovirus (5/23/25); GI/SW/PT following; pt now willing to seek treatment for EtOH

## 2025-05-27 NOTE — PROGRESS NOTES
[facility]  Gastroenterology Progress Note     Rakesh Carey MRN# [id number]   YOB: 1970 Age: 55 year old          Assessment and Plan:     Rakesh Carey is a 55 year old male with a past medical history of peptic ulcer disease, GERD, diverticulitis, anxiety, tobacco use, hypertension, and TBI who presents with progressive whole-body weakness/decreased appetite for 2 weeks. GI consulted for EtOH hepatitis.     Generalized weakness  Acute liver failure without hepatic coma  Norovirus  LFT quite consistent with EtOH pattern with AST//52, total bilirubin 3.6, and alkaline phosphatase 226. Ferritin level elevated 2432, iron index 88% so we will need to follow-up as outpatient for hemochromatosis but this is likely due to acute phase reactant at this time   CT noting hepatic steatosis, hepatomegaly, early cirrhosis, cholelithiasis. Urinary infection  Noted to have Norovirus  MDF 32 continue with prednisone 40 mg daily with weekly 10 mg taper   MELD 16  Patient also has underlying liver cirrhosis given ascites around the liver with low platelet count  Has hepatic encephalopathy- ammonia trending down to 74 with lactulose    - having 5 stools daily will decrease lactulose  - daily labs  - continue current treatment  - Alcohol cessation  - consider EGD and colonoscopy as outpatient      Rectal bleeding  Hgb stable  H/o hemorrhoids and irritated with diarrhea                Interval History:     no new complaints and has ongoing diarrhea 5 times a day watery. No abdominal pain. Tolerating diet              Review of Systems:     C: NEGATIVE for fever, chills, change in weight  E/M: NEGATIVE for ear, mouth and throat problems  R: NEGATIVE for significant cough or SOB  CV: NEGATIVE for chest pain, palpitations or peripheral edema             Medications:   I have reviewed this patient's current medications  Current Facility-Administered Medications   Medication Dose Route  Frequency Provider Last Rate Last Admin    carvedilol (COREG) tablet 25 mg  25 mg Oral BID w/meals Glenda Márquez MD   25 mg at 05/27/25 0823    cloNIDine (CATAPRES) tablet 0.1 mg  0.1 mg Oral Q8H Glenda Márquez MD   0.1 mg at 05/27/25 0631    folic acid (FOLVITE) tablet 1 mg  1 mg Oral Daily Glenda Márquez MD   1 mg at 05/27/25 0823    furosemide (LASIX) injection 10 mg  10 mg Intravenous Daily Dot Saldaña MD   10 mg at 05/27/25 0822    [START ON 5/30/2025] gabapentin (NEURONTIN) capsule 100 mg  100 mg Oral Q8H Glenda Márquez MD        [START ON 5/28/2025] gabapentin (NEURONTIN) capsule 300 mg  300 mg Oral Q8H Glenda Márquez MD        gabapentin (NEURONTIN) capsule 600 mg  600 mg Oral Q8H Glenda Márquez MD   600 mg at 05/27/25 0823    hydrocortisone (CORTAID) 1 % cream   Topical BID Glenda Márquez MD   Given at 05/27/25 0834    lactulose (CHRONULAC) solution 20 g  20 g Oral BID Dot Saldaña MD   20 g at 05/27/25 0824    magnesium oxide (MAG-OX) tablet 400 mg  400 mg Oral Q4H Marilyn Jules MD        multivitamin, therapeutic (THERA-VIT) tablet 1 tablet  1 tablet Oral Daily Glenda Márquez MD   1 tablet at 05/27/25 0822    pantoprazole (PROTONIX) EC tablet 40 mg  40 mg Oral BID Glenda Márquez MD   40 mg at 05/27/25 0823    prednisoLONE (ORAPRED) 15 MG/5 ML solution 40 mg  40 mg Oral Daily Glenda Márquez MD   40 mg at 05/27/25 0833    sodium chloride (PF) 0.9% PF flush 3 mL  3 mL Intracatheter Q8H DARLENE Glenda Márquez MD   3 mL at 05/27/25 0638    spironolactone (ALDACTONE) tablet 25 mg  25 mg Oral Daily Glenda Márquez MD   25 mg at 05/27/25 0823    thiamine (B-1) injection 250 mg  250 mg Intravenous Daily Stewart Wolff MD   250 mg at 05/27/25 0822    Followed by    [START ON 5/30/2025] thiamine (B-1) tablet 100 mg  100 mg Oral Daily Stewart Wolff MD                      Physical Exam:   Vitals were reviewed  Vital Signs with  Ranges  Temp:  [97.7  F (36.5  C)-98.2  F (36.8  C)] 98.2  F (36.8  C)  Pulse:  [80-88] 81  Resp:  [14-18] 18  BP: (108-143)/(69-90) 121/77  SpO2:  [93 %-95 %] 93 %  I/O last 3 completed shifts:  In: 1960 [P.O.:1960]  Out: -   Constitutional: Alert, oriented to person, place, date, situation.  Cooperative, lying in bed in NAD.   Respiratory:  Lungs CTAB.  No crackles, wheezes, or rhonchi, no labored breathing.  Cardiovascular:  Heart RRR, no MRG, no edema.  GI:  Abdomen soft, NT/ND and with normoactive BS  Skin/Integumen:  Warm, dry, non-diaphoretic.  MSK: CMS x4 intact.             Data:   I reviewed the patient's new clinical lab test results.   Recent Labs   Lab Test 05/27/25  0720 05/26/25  0555 05/25/25 1216 05/24/25  0436 05/23/25  0655   WBC 5.3  --  4.8  --  6.7   HGB 10.9*  --  11.0*  --  10.5*   *  --  99  --  98   PLT 69*  --  63*  --  64*   INR 1.48* 1.56* 1.61*   < >  --     < > = values in this interval not displayed.     Recent Labs   Lab Test 05/27/25  0720 05/26/25  0555 05/25/25 1216 05/24/25  1539 05/24/25  0436   POTASSIUM 4.4 4.3 4.0   < > 3.2*  3.2*   CHLORIDE 100  --  99  --  100   CO2 29  --  24  --  24   BUN 9.9  --  8.3  --  8.1   ANIONGAP 7  --  11  --  13    < > = values in this interval not displayed.     Recent Labs   Lab Test 05/27/25  0720 05/26/25  0555 05/25/25  1216 05/23/25  0655 05/22/25  2314   ALBUMIN 2.8* 2.7* 2.8*   < >  --    BILITOTAL 2.6* 2.6* 3.2*   < >  --    ALT 52 52 58   < >  --    * 140* 176*   < >  --    PROTEIN  --   --   --   --  50*    < > = values in this interval not displayed.       I reviewed the patient's new imaging results.    All laboratory data reviewed  All imaging studies reviewed by me.    Argenis Velez PA-C,  5/27/2025  [facility]  Office : [phone]  Cell: [phone] (Dr. Garibay)  Cell: [phone] (Argenis Velez PA-C)

## 2025-05-27 NOTE — PLAN OF CARE
Goal Outcome Evaluation:                          DATE & TIME: 5/26/25, pm shift    Cognitive Concerns/ Orientation : A&O x 4.   BEHAVIOR & AGGRESSION TOOL COLOR: Green  CIWA SCORE: 0,0   ABNL VS/O2: VSS. On RA  MOBILITY: SBA with walker. Patient refusing bed alarm but calls appropriately.  PAIN MANAGMENT: denies  DIET: Low sat fat Na<2400mg, no caffeine. 1800cc fluid restriction.  BOWEL/BLADDER: Continent. Ambulating to the bathroom. Lactulose held due to multiple loose stools today  ABNL LAB/BG: Albumin 2.7, , INR 1.56, PT 18.2,   DRAIN/DEVICES: PIV SL  TELEMETRY RHYTHM: NSR  SKIN: Scattered bruises. Left knee scab  TESTS/PROCEDURES: None  D/C DATE: pending improvement.  Discharge Barriers: pending  OTHER IMPORTANT INFO: GI and CD following. SW consult pending. PT following.

## 2025-05-27 NOTE — PROGRESS NOTES
[facility]    Medicine Progress Note - Hospitalist Service    Date of Admission:  5/22/2025    Assessment & Plan           Alcoholic Hepatitis  Alcoholic Cirrhosis, new diagnosis with Anasarca  Alcoholic Ketoacidosis  Hypoalbuminemia  Obesity  Deconditioning  Hepatic Encephalopathy  MELD 19, Madrey score 32,   CT showed Hepatic steatosis, hepatomegaly, early cirrhosis, and small volume ascites   --GI consult: Input appreciated  --LFTs trending low.  -- Continue to hold statin in setting of liver injury.  -- Continue prednisolone 40mg daily, GI managing.  -- Continue Aldactone 25 mg and Lasix 10mg   --PT eval: Home with home PT  --Fluid restriction  -- lactulose BID     Norovirus infection  -- Continue supportive care  - Norovirus infection resolved patient continues to have diarrhea due to lactulose     Distended gallbladder with gallbladder sludge and mild wall thickening   Hemorroidal rectal bleeding  No RUQ tenderness, ALT wnl at 62 while  likely from ETOH use  In regards to Vomiting/diarrhea, could be enteritis due to noro virus  - Ultrasound 5/22/25  Borderline wall thickness. Trace pericholecystic/perihepatic fluid. No sonographic Alaniz sign.  -Continue to monitor        LARISA  Likely 2/2 renal congestion  -Resolved  -Continue Lasix and aldactone       Alcohol Use w/ active Wtihdrawal  GERD  Last drink 1 day PTA. No hx prior withdrawal  --thiamine/folate/gabapentin/clonidine ordered in conjugction with CIWA protocol  --CD ordered per patient preference. He initially declined services; now agrees to rehab so they have been re-consulted.   --resumed PTA PPI  -Not scoring currently on CIWA   -Clonidine tapered down to 0.1 mg twice daily     Hypomagnesemia  Hypokalemia  --telemetry  --RN based repletion ordered  --Hold PTA hydrochlorothiazide     Anemia  Thrombocytopenia  Likely 2/2 liver disease  --trend Hb/plts  -Platelet count stable at 69  -Hemoglobin stable at 10.9    "  HTN  HLD  --hold statin given hepatitis  --resumed coreg but not hydrochlorothiazide given hypokalemia     5 cm complex left renal cyst  --outpatient follow-up w/ renal mass CT  -Urology referral     Chronic back pain   Lumbar spolndylosis W/ radiculopathy  S/p B/l L5-S1 TFESI 1/2025  --max tylenol 2g  --Low dose oxy only given cirrhosis             Diet: Combination Diet Low Saturated Fat Na <2400mg Diet, No Caffeine Diet  Fluid restriction 1800 ML FLUID    DVT Prophylaxis: Warfarin  Infante Catheter: Not present  Lines: None     Cardiac Monitoring: ACTIVE order. Indication: Electrolyte Imbalance (24 hours)- Magnesium <1.3 mg/ml; Potassium < =2.8 or > 5.5 mg/ml  Code Status: Full Code      Clinically Significant Risk Factors         # Hyponatremia: Lowest Na = 134 mmol/L in last 2 days, will monitor as appropriate     # Hypomagnesemia: Lowest Mg = 1.3 mg/dL in last 2 days, will replace as needed   # Hypoalbuminemia: Lowest albumin = 2.6 g/dL at 5/23/2025  6:55 AM, will monitor as appropriate  # Coagulation Defect: INR = 1.56 (Ref range: 0.85 - 1.15) and/or PTT = N/A, will monitor for bleeding  # Thrombocytopenia: Lowest platelets = 63 in last 2 days, will monitor for bleeding   # Hypertension: Noted on problem list            # Morbid Obesity: Estimated body mass index is 46.85 kg/m  as calculated from the following:    Height as of this encounter: 1.778 m (5' 10\").    Weight as of this encounter: 148.1 kg (326 lb 8 oz).             Social Drivers of Health    Tobacco Use: Medium Risk (8/13/2024)    Patient History     Smoking Tobacco Use: Former     Smokeless Tobacco Use: Never   Physical Activity: Inactive (8/13/2024)    Exercise Vital Sign     Days of Exercise per Week: 0 days     Minutes of Exercise per Session: 0 min   Stress: Stress Concern Present (8/13/2024)    [employer] - Occupational Stress Questionnaire     Feeling of Stress : Rather much   Social Connections: Unknown " (8/13/2024)    Social Connection and Isolation Panel [NHANES]     Frequency of Social Gatherings with Friends and Family: Three times a week          Disposition Plan     Medically Ready for Discharge: Ready Now             Marilyn Jules MD  Hospitalist Service  [facility]  Securely message with [employer] (more info)  Text page via [employer] Paging/Directory   ______________________________________________________________________    Interval History   Patient seen and examined at bedside having some mild abdominal distention.  Denies any chest pain denies any shortness of breath      Physical Exam   Vital Signs: Temp: 97.7  F (36.5  C) Temp src: Oral BP: 136/84 Pulse: 88   Resp: 16 SpO2: 95 % O2 Device: None (Room air)    Weight: 326 lbs 8.02 oz    Physical Exam  Cardiovascular:      Rate and Rhythm: Normal rate and regular rhythm.      Heart sounds: Normal heart sounds.   Pulmonary:      Effort: Pulmonary effort is normal. No respiratory distress.   Abdominal:      General: There is distension.      Palpations: Abdomen is soft.          Medical Decision Making       38 MINUTES SPENT BY ME on the date of service doing chart review, history, exam, documentation & further activities per the note.      Data   ------------------------- PAST 24 HR DATA REVIEWED -----------------------------------------------    I have personally reviewed the following data over the past 24 hrs:    5.3  \   10.9 (L)   / 69 (L)     136 100 9.9 /  113 (H)   4.4 29 0.86 \     ALT: 52 AST: 114 (H) AP: 226 (H) TBILI: 2.6 (H)   ALB: 2.8 (L) TOT PROTEIN: 6.8 LIPASE: N/A     INR:  1.48 (H) PTT:  N/A   D-dimer:  N/A Fibrinogen:  N/A       Imaging results reviewed over the past 24 hrs:   No results found for this or any previous visit (from the past 24 hours).

## 2025-05-27 NOTE — CONSULTS
Met with pt for CD Consult and completed GABY Comprehensive Assessment.    Sending ROIs to unit [facility] for pt signature to facilitate referrals.    Sending pt contact info for programs they are being referred to along with community resources via secure e-mail to [email or web address] and adding to pt's AVS.     Addendum 1514: Signed ROIs returned and referrals faxed    Recommendations:   1)  Complete a Residential CD Treatment Program (3.5 Clinically Managed Medium and High Intensity Residential Services)  2)  Abstain from all mood-altering chemicals unless prescribed by a licensed provider.   3)  Attend, at minimum, 2 weekly support group meetings, such as 12 step based (AA/NA), SMART Recovery, Health Realizations, and/or Refuge Recovery meetings.     4)  Actively work with a mentor/sponsor on a weekly basis.   5)  Follow all the recommendations of your treatment/medical providers.  6)  Patient may benefit from obtaining a full mental health evaluation.    Clinical Substantiation:  Patient has been unable to maintain abstinence from alcohol while living at his current home environment, would benefit from developing long-term sober maintenance skills, would benefit from developing sober coping skills, would benefit from developing a sober peer support network, has mental health symptoms which are exacerbated by substance abuse, and has medical issues which are exacerbated by substance abuse.    Referrals/ Alternatives:  [facility]  [address] [place]  [address]  P: [phone]  F: [phone]    [address] [address]  P: [phone]  F:  [phone]    DAANES Assessment ID: [id number]      GABY consult completed by:   LEIGH ANN Garza, [facility]    GABY Evaluation Counselor  Email: [email or web address] ; Phone: [phone]  [facility] Evaluation Department  [address] [place] [address]  [id number]

## 2025-05-27 NOTE — CONSULTS
Care Management Initial Consult    General Information  Assessment completed with: Patient,    Type of CM/SW Visit: Initial Assessment    Primary Care Provider verified and updated as needed: Yes   Readmission within the last 30 days: no previous admission in last 30 days      Reason for Consult: discharge planning  Advance Care Planning:            Communication Assessment  Patient's communication style: spoken language (English or Bilingual)    Hearing Difficulty or Deaf: no   Wear Glasses or Blind: yes    Cognitive  Cognitive/Neuro/Behavioral: WDL  Level of Consciousness: alert  Arousal Level: opens eyes spontaneously  Orientation: oriented x 4  Mood/Behavior: calm, cooperative  Best Language: 0 - No aphasia  Speech: clear, logical    Living Environment:   People in home: alone     Current living Arrangements: apartment      Able to return to prior arrangements: other (see comments) (after TCU and GABY program)       Family/Social Support:  Care provided by: self  Provides care for: no one  Marital Status:   Support system: Other (specify) (ex wife)          Description of Support System: Supportive, Involved    Support Assessment: Adequate family and caregiver support    Current Resources:   Patient receiving home care services: No        Community Resources:    Equipment currently used at home: cane, straight  Supplies currently used at home:      Employment/Financial:  Employment Status: employed full-time        Financial Concerns:             Does the patient's insurance plan have a 3 day qualifying hospital stay waiver?  No    Lifestyle & Psychosocial Needs:  Social Drivers of Health     Food Insecurity: Low Risk  (5/25/2025)    Food Insecurity     Within the past 12 months, did you worry that your food would run out before you got money to buy more?: No     Within the past 12 months, did the food you bought just not last and you didn t have money to get more?: No   Depression: Not at risk  (8/13/2024)    PHQ-2     PHQ-2 Score: 2   Housing Stability: Low Risk  (5/25/2025)    Housing Stability     Do you have housing? : Yes     Are you worried about losing your housing?: No   Tobacco Use: Medium Risk (8/13/2024)    Patient History     Smoking Tobacco Use: Former     Smokeless Tobacco Use: Never     Passive Exposure: Not on file   Financial Resource Strain: Low Risk  (5/25/2025)    Financial Resource Strain     Within the past 12 months, have you or your family members you live with been unable to get utilities (heat, electricity) when it was really needed?: No   Alcohol Use: Not on file   Transportation Needs: Low Risk  (5/25/2025)    Transportation Needs     Within the past 12 months, has lack of transportation kept you from medical appointments, getting your medicines, non-medical meetings or appointments, work, or from getting things that you need?: No   Physical Activity: Inactive (8/13/2024)    Exercise Vital Sign     Days of Exercise per Week: 0 days     Minutes of Exercise per Session: 0 min   Interpersonal Safety: Low Risk  (5/25/2025)    Interpersonal Safety     Do you feel physically and emotionally safe where you currently live?: Yes     Within the past 12 months, have you been hit, slapped, kicked or otherwise physically hurt by someone?: No     Within the past 12 months, have you been humiliated or emotionally abused in other ways by your partner or ex-partner?: No   Stress: Stress Concern Present (8/13/2024)    [place] - Occupational Stress Questionnaire     Feeling of Stress : Rather much   Social Connections: Unknown (8/13/2024)    Social Connection and Isolation Panel [NHANES]     Frequency of Communication with Friends and Family: Not on file     Frequency of Social Gatherings with Friends and Family: Three times a week     Attends Jew Services: Not on file     Active Member of Clubs or Organizations: Not on file     Attends Club or Organization  Meetings: Not on file     Marital Status: Not on file   Health Literacy: Not on file       Functional Status:  Prior to admission patient needed assistance:   Dependent ADLs:: Independent  Dependent IADLs:: Independent       Mental Health Status:  Mental Health Status: No Current Concerns       Chemical Dependency Status:  Chemical Dependency Status: Current Concern  Chemical Dependency Management: Other (see comment) (wants residential treatment)          Values/Beliefs:  Spiritual, Cultural Beliefs, Church Practices, Values that affect care:                 Discussed  Partnership in Safe Discharge Planning  document with patient/family:     Additional Information:  Met with patient to complete assessment for high risk, chemical health issues and TCU arrangement.  Per H&P, patient  presents with progressive whole-body weakness/decreased appetite for 2 weeks, no falls.   Also reports vomiting, chills and diarrhea over 1 week, that has provoked his hemorroids with scant rectal blood. Reports 1 year of poor sleep/tiredness. After divorce 6months PTA, has increased ETOH use to 1 pint a day, no hx withdrawal though has not gone without ETOH for month than 1 day in the past 6 months.     Patient completed SUDS assessment and referrals are being made to [facility] and [facility]  He signed the JESSICA's and Harjinder Patrick will fax referrals today.     TCU is recommended prior to entering a residential program.     Met with patient today.  Patient appeared oriented and lucid. Very open to answering questions. Confirmed his address, phone number and PCP are all current.    Patient is divoreced and his marriage ended because of his alcohol use. He regrets continuing alcohol and his marriage ending in a divorce.   He remains close with his ex-wife Christina.  They go on vacations together and her family invites him for the holiday.  Christina has been visiting patient here and will go over to his town home prior to him  "returning and throw away any alcohol. He also expects either she or her grown son can transport him to the TCU.     His family lives in [place].  He remains close to his mother by visiting her however is distant with his two step brothers.  This hasn't always been the case, but has been since they now say he is their \"step brother, not his brother as they have different fathers\".    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  He currently is receiving full pay. He plans to keep his medical insurance in place.   He has contacted manager of the complex he lives at and making arrangements to stay in good standings with his rent.       He reports he is scared to go home because being at home alone is where he drinks. He wants to enter treatment and stopped drinking. He understands his health has been impacted due to his alcohol use.     Writer reviewed TCU is recommended so he remains independence in all ADL's and able to ambulate without a walker.  Most of the GABY residential programs are not conducive to use of a walker.     Writer gave patient to [facility] handout with Q&A about TCU's. Explained there are limited TCU which will accept patient's with alcohol disorder.  He requests a location in [place] which is convenient for his ex-wife.   Referral made to [facility] [place].  Explained he will have a roommate.    Next Steps: Follow up with TCU referral and referrals to [facility]  The TCU will need to obtain insurance authorization prior to admission.    Vita Robison, LICSW     "

## 2025-05-27 NOTE — PROGRESS NOTES
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 in general medical records sent to ANY outside agency without the patient's written consent.

## 2025-05-27 NOTE — PROGRESS NOTES
Patient meets criteria for removal of Enteric Precautions. If patient comes off enteric before discharge, Patient, if possible, should be moved into a new private room. Old room (or current room if unable to move patient) needs to be terminally cleaned with bleach. The patient must be placed in a clean gown with clean bed linens. Evaristo Yip, Infection Prevention on 5/27/2025 at 11:58 AM

## 2025-05-28 ENCOUNTER — APPOINTMENT (OUTPATIENT)
Dept: PHYSICAL THERAPY | Facility: CLINIC | Age: 55
End: 2025-05-28
Payer: COMMERCIAL

## 2025-05-28 ENCOUNTER — APPOINTMENT (OUTPATIENT)
Dept: ULTRASOUND IMAGING | Facility: CLINIC | Age: 55
End: 2025-05-28
Attending: STUDENT IN AN ORGANIZED HEALTH CARE EDUCATION/TRAINING PROGRAM
Payer: COMMERCIAL

## 2025-05-28 LAB
ALBUMIN SERPL BCG-MCNC: 2.9 G/DL (ref 3.5–5.2)
ALP SERPL-CCNC: 204 U/L (ref 40–150)
ALT SERPL W P-5'-P-CCNC: 51 U/L (ref 0–70)
ANION GAP SERPL CALCULATED.3IONS-SCNC: 9 MMOL/L (ref 7–15)
AST SERPL W P-5'-P-CCNC: 100 U/L (ref 0–45)
BILIRUB DIRECT SERPL-MCNC: 1.43 MG/DL (ref 0–0.3)
BILIRUB SERPL-MCNC: 2.5 MG/DL
BUN SERPL-MCNC: 12 MG/DL (ref 6–20)
CALCIUM SERPL-MCNC: 9.2 MG/DL (ref 8.8–10.4)
CHLORIDE SERPL-SCNC: 101 MMOL/L (ref 98–107)
CREAT SERPL-MCNC: 0.88 MG/DL (ref 0.67–1.17)
EGFRCR SERPLBLD CKD-EPI 2021: >90 ML/MIN/1.73M2
ERYTHROCYTE [DISTWIDTH] IN BLOOD BY AUTOMATED COUNT: 19.4 % (ref 10–15)
GLUCOSE SERPL-MCNC: 113 MG/DL (ref 70–99)
HCO3 SERPL-SCNC: 27 MMOL/L (ref 22–29)
HCT VFR BLD AUTO: 34.8 % (ref 40–53)
HGB BLD-MCNC: 11.3 G/DL (ref 13.3–17.7)
INR PPP: 1.42 (ref 0.85–1.15)
MAGNESIUM SERPL-MCNC: 1.6 MG/DL (ref 1.7–2.3)
MCH RBC QN AUTO: 33.5 PG (ref 26.5–33)
MCHC RBC AUTO-ENTMCNC: 32.5 G/DL (ref 31.5–36.5)
MCV RBC AUTO: 103 FL (ref 78–100)
PLATELET # BLD AUTO: 75 10E3/UL (ref 150–450)
POTASSIUM SERPL-SCNC: 4.7 MMOL/L (ref 3.4–5.3)
PROT SERPL-MCNC: 7 G/DL (ref 6.4–8.3)
PROTHROMBIN TIME: 16.9 SECONDS (ref 11.8–14.8)
RBC # BLD AUTO: 3.37 10E6/UL (ref 4.4–5.9)
SODIUM SERPL-SCNC: 137 MMOL/L (ref 135–145)
WBC # BLD AUTO: 6.5 10E3/UL (ref 4–11)

## 2025-05-28 PROCEDURE — 36415 COLL VENOUS BLD VENIPUNCTURE: CPT | Performed by: STUDENT IN AN ORGANIZED HEALTH CARE EDUCATION/TRAINING PROGRAM

## 2025-05-28 PROCEDURE — 97530 THERAPEUTIC ACTIVITIES: CPT | Mod: GP

## 2025-05-28 PROCEDURE — 76705 ECHO EXAM OF ABDOMEN: CPT

## 2025-05-28 PROCEDURE — 250N000011 HC RX IP 250 OP 636: Performed by: STUDENT IN AN ORGANIZED HEALTH CARE EDUCATION/TRAINING PROGRAM

## 2025-05-28 PROCEDURE — 250N000013 HC RX MED GY IP 250 OP 250 PS 637: Performed by: STUDENT IN AN ORGANIZED HEALTH CARE EDUCATION/TRAINING PROGRAM

## 2025-05-28 PROCEDURE — 97116 GAIT TRAINING THERAPY: CPT | Mod: GP

## 2025-05-28 PROCEDURE — 99232 SBSQ HOSP IP/OBS MODERATE 35: CPT | Performed by: STUDENT IN AN ORGANIZED HEALTH CARE EDUCATION/TRAINING PROGRAM

## 2025-05-28 PROCEDURE — 120N000001 HC R&B MED SURG/OB

## 2025-05-28 PROCEDURE — 83735 ASSAY OF MAGNESIUM: CPT | Performed by: INTERNAL MEDICINE

## 2025-05-28 PROCEDURE — 250N000011 HC RX IP 250 OP 636: Performed by: HOSPITALIST

## 2025-05-28 PROCEDURE — 80076 HEPATIC FUNCTION PANEL: CPT | Performed by: PHYSICIAN ASSISTANT

## 2025-05-28 PROCEDURE — 85027 COMPLETE CBC AUTOMATED: CPT | Performed by: STUDENT IN AN ORGANIZED HEALTH CARE EDUCATION/TRAINING PROGRAM

## 2025-05-28 PROCEDURE — 250N000012 HC RX MED GY IP 250 OP 636 PS 637: Performed by: STUDENT IN AN ORGANIZED HEALTH CARE EDUCATION/TRAINING PROGRAM

## 2025-05-28 PROCEDURE — 85610 PROTHROMBIN TIME: CPT | Performed by: PHYSICIAN ASSISTANT

## 2025-05-28 RX ORDER — MAGNESIUM OXIDE 400 MG/1
400 TABLET ORAL EVERY 4 HOURS
Status: COMPLETED | OUTPATIENT
Start: 2025-05-28 | End: 2025-05-28

## 2025-05-28 RX ADMIN — CARVEDILOL 25 MG: 25 TABLET, FILM COATED ORAL at 08:51

## 2025-05-28 RX ADMIN — PANTOPRAZOLE SODIUM 40 MG: 40 TABLET, DELAYED RELEASE ORAL at 08:51

## 2025-05-28 RX ADMIN — Medication 400 MG: at 16:46

## 2025-05-28 RX ADMIN — GABAPENTIN 300 MG: 300 CAPSULE ORAL at 16:46

## 2025-05-28 RX ADMIN — Medication 400 MG: at 13:53

## 2025-05-28 RX ADMIN — CLONIDINE HYDROCHLORIDE 0.1 MG: 0.1 TABLET ORAL at 21:05

## 2025-05-28 RX ADMIN — CARVEDILOL 25 MG: 25 TABLET, FILM COATED ORAL at 17:43

## 2025-05-28 RX ADMIN — HYDROCORTISONE: 10 CREAM TOPICAL at 21:05

## 2025-05-28 RX ADMIN — GABAPENTIN 300 MG: 300 CAPSULE ORAL at 08:51

## 2025-05-28 RX ADMIN — CLONIDINE HYDROCHLORIDE 0.1 MG: 0.1 TABLET ORAL at 08:51

## 2025-05-28 RX ADMIN — OXYCODONE HYDROCHLORIDE 2.5 MG: 5 TABLET ORAL at 01:36

## 2025-05-28 RX ADMIN — SPIRONOLACTONE 25 MG: 25 TABLET ORAL at 08:51

## 2025-05-28 RX ADMIN — FUROSEMIDE 10 MG: 10 INJECTION, SOLUTION INTRAVENOUS at 08:52

## 2025-05-28 RX ADMIN — THIAMINE HYDROCHLORIDE 250 MG: 100 INJECTION, SOLUTION INTRAMUSCULAR; INTRAVENOUS at 08:54

## 2025-05-28 RX ADMIN — PREDNISOLONE SODIUM PHOSPHATE 40 MG: 15 SOLUTION ORAL at 09:03

## 2025-05-28 RX ADMIN — MULTIVITAMIN TABLET 1 TABLET: TABLET at 08:52

## 2025-05-28 RX ADMIN — FOLIC ACID 1 MG: 1 TABLET ORAL at 08:51

## 2025-05-28 RX ADMIN — OXYCODONE HYDROCHLORIDE 2.5 MG: 5 TABLET ORAL at 09:04

## 2025-05-28 RX ADMIN — PANTOPRAZOLE SODIUM 40 MG: 40 TABLET, DELAYED RELEASE ORAL at 21:05

## 2025-05-28 RX ADMIN — GABAPENTIN 600 MG: 300 CAPSULE ORAL at 01:36

## 2025-05-28 RX ADMIN — LACTULOSE 20 G: 20 SOLUTION ORAL at 09:03

## 2025-05-28 RX ADMIN — OXYCODONE HYDROCHLORIDE 2.5 MG: 5 TABLET ORAL at 16:46

## 2025-05-28 RX ADMIN — OXYCODONE HYDROCHLORIDE 2.5 MG: 5 TABLET ORAL at 21:05

## 2025-05-28 ASSESSMENT — ACTIVITIES OF DAILY LIVING (ADL)
ADLS_ACUITY_SCORE: 46

## 2025-05-28 NOTE — PLAN OF CARE
Goal Outcome Evaluation:    Summary: Vomiting, Diarrhea, Weakness, ETOH     DATE & TIME: 5/28/25 [id number]  Cognitive Concerns/ Orientation : A&Ox4, calm, cooperative and very pleasant.  BEHAVIOR & AGGRESSION TOOL COLOR: Green  CIWA SCORE: discontinued  ABNL VS/O2: VSS, on RA  MOBILITY: SBA GBW  PAIN MANAGMENT: Oxy given x2 for chronic low back pain  DIET: Low saturated fat + < 2400 mg sodium + no caffeine + Fluid restriction 1800 ml   BOWEL/BLADDER: Continent of bowel and bladder, urgency, multiple BM's today  ABNL LAB/BG: K- 4.7 recheck in am, Mg replaced x2 recheck ordered, ALT/AST 51/100, Bili- 2.5, Hgb- 11.3  DRAIN/DEVICES: PIV SL  TELEMETRY RHYTHM: NSR  SKIN: Large bruise on right hip/flank, galindo BLE, scab to L knee  TESTS/PROCEDURES: Abdominal US completed today  D/C DATE/GOALS/PLACE: Discharge to [facility] likely tomorrow pending insurance authorization.  OTHER IMPORTANT INFO: GI signed off

## 2025-05-28 NOTE — PLAN OF CARE
Goal Outcome Evaluation:    Pt is A&O x4, up with AX1/ SBA with walker, voiding adequately per urinal. VSS on RA, afebrile. Pain managed with oral medications. CIWA rating of 0. On Fluid restriction of 1800 ml. PIV SL. GI and CD following. Declines bed alarm but calls appropriately. Will continue to monitor.

## 2025-05-28 NOTE — PROGRESS NOTES
[facility]    Medicine Progress Note - Hospitalist Service    Date of Admission:  5/22/2025    Assessment & Plan   Alcoholic Hepatitis  Alcoholic Cirrhosis, new diagnosis with Anasarca  Alcoholic Ketoacidosis  Hypoalbuminemia  Obesity  Deconditioning  Hepatic Encephalopathy  MELD 19, Madrey score 32,   CT showed Hepatic steatosis, hepatomegaly, early cirrhosis, and small volume ascites   --GI consult: Input appreciated  --LFTs trending low.  -- Continue to hold statin in setting of liver injury.  -- Continue prednisolone 40mg daily, GI managing.  -- Continue Aldactone 25 mg and Lasix 10mg   --PT eval: Home with home PT  --Fluid restriction  -- lactulose daily   -Repeat Ultrasound shows minimal ascites   -Lfts improving , Bilirubin improving   -Calculate Lille score tomorrow   -Discussed with gastroenterology     Norovirus infection Resolved  -- Continue supportive care  - Norovirus infection resolved patient continues to have diarrhea due to lactulose     Distended gallbladder with gallbladder sludge and mild wall thickening   Hemorroidal rectal bleeding  No RUQ tenderness, ALT wnl at 62 while  likely from ETOH use  In regards to Vomiting/diarrhea, could be enteritis due to noro virus  - Ultrasound 5/22/25  Borderline wall thickness. Trace pericholecystic/perihepatic fluid. No sonographic Alaniz sign.  -Continue to monitor        LARISA Resolved  Likely 2/2 renal congestion  -Resolved  -Continue Lasix and aldactone       Alcohol Use w/ active Wtihdrawal  GERD  Last drink 1 day PTA. No hx prior withdrawal  --thiamine/folate/gabapentin/clonidine ordered in conjugction with CIWA protocol  --CD ordered per patient preference. He initially declined services; now agrees to rehab so they have been re-consulted.   --resumed PTA PPI  -Not scoring currently on CIWA   -Clonidine tapered down to 0.1 mg twice daily     Hypomagnesemia  Hypokalemia  --telemetry  --RN based repletion ordered  --Hold  "PTA hydrochlorothiazide     Anemia  Thrombocytopenia  Likely 2/2 liver disease  --trend Hb/plts  -Platelet count stable at 75   -Hemoglobin stable at 11.3      HTN  HLD  --hold statin given hepatitis  --resumed coreg but not hydrochlorothiazide given hypokalemia     5 cm complex left renal cyst  --outpatient follow-up w/ renal mass CT  -Urology referral     Chronic back pain   Lumbar spolndylosis W/ radiculopathy  S/p B/l L5-S1 TFESI 1/2025  --max tylenol 2g  --Low dose oxy only given cirrhosis             Diet: Combination Diet Low Saturated Fat Na <2400mg Diet, No Caffeine Diet  Fluid restriction 1800 ML FLUID  Snacks/Supplements Adult: Ensure Enlive; With Meals    DVT Prophylaxis: Pneumatic Compression Devices  Infante Catheter: Not present  Lines: None     Cardiac Monitoring: ACTIVE order. Indication: Electrolyte Imbalance (24 hours)- Magnesium <1.3 mg/ml; Potassium < =2.8 or > 5.5 mg/ml  Code Status: Full Code      Clinically Significant Risk Factors             # Hypomagnesemia: Lowest Mg = 1.6 mg/dL in last 2 days, will replace as needed   # Hypoalbuminemia: Lowest albumin = 2.6 g/dL at 5/23/2025  6:55 AM, will monitor as appropriate  # Coagulation Defect: INR = 1.42 (Ref range: 0.85 - 1.15) and/or PTT = N/A, will monitor for bleeding  # Thrombocytopenia: Lowest platelets = 69 in last 2 days, will monitor for bleeding   # Hypertension: Noted on problem list            # Morbid Obesity: Estimated body mass index is 46.85 kg/m  as calculated from the following:    Height as of this encounter: 1.778 m (5' 10\").    Weight as of this encounter: 148.1 kg (326 lb 8 oz).             Social Drivers of Health    Tobacco Use: Medium Risk (8/13/2024)    Patient History     Smoking Tobacco Use: Former     Smokeless Tobacco Use: Never   Physical Activity: Inactive (8/13/2024)    Exercise Vital Sign     Days of Exercise per Week: 0 days     Minutes of Exercise per Session: 0 min   Stress: Stress Concern Present (8/13/2024)    " [employer] - Occupational Stress Questionnaire     Feeling of Stress : Rather much   Social Connections: Unknown (8/13/2024)    Social Connection and Isolation Panel [NHANES]     Frequency of Social Gatherings with Friends and Family: Three times a week          Disposition Plan     Medically Ready for Discharge: Anticipated Tomorrow             Marilyn Jules MD  Hospitalist Service  [facility]  Securely message with [employer] (more info)  Text page via [employer] Paging/Directory   ______________________________________________________________________    Interval History     Patient seen and examined at bedside     No acute overnight events.  Had some mild diarrhea last night.  No hematemesis or blood in the stool.    Discussed with patient about the results of his ultrasound which did not show any significant ascites.      Physical Exam   Vital Signs: Temp: 97.8  F (36.6  C) Temp src: Oral BP: (!) 150/92 Pulse: 93   Resp: 20 SpO2: 96 % O2 Device: None (Room air)    Weight: 326 lbs 8.02 oz    Physical Exam  Cardiovascular:      Rate and Rhythm: Normal rate and regular rhythm.   Pulmonary:      Effort: Pulmonary effort is normal. No respiratory distress.      Breath sounds: Normal breath sounds.   Abdominal:      General: There is distension.      Palpations: Abdomen is soft.      Tenderness: There is no abdominal tenderness.   Musculoskeletal:      Right lower leg: No edema.      Left lower leg: No edema.          Medical Decision Making       36 MINUTES SPENT BY ME on the date of service doing chart review, history, exam, documentation & further activities per the note.      Data

## 2025-05-28 NOTE — PROGRESS NOTES
Care Management Follow Up    Length of Stay (days): 5    Expected Discharge Date: 05/29/2025     Concerns to be Addressed: discharge planning     Patient plan of care discussed at interdisciplinary rounds: Yes    Anticipated Discharge Disposition: Transitional Care              Anticipated Discharge Services:    Anticipated Discharge DME:      Patient/family educated on Medicare website which has current facility and service quality ratings:    Education Provided on the Discharge Plan: Yes  Patient/Family in Agreement with the Plan: yes    Referrals Placed by CM/SW: Post Acute Facilities  Private pay costs discussed: Not applicable    Discussed  Partnership in Safe Discharge Planning  document with patient/family: Yes: verbally discussed     Handoff Completed: No, handoff not indicated or clinically appropriate    Additional Information:  Patient accepted by Br[facility] at [facility].  The TCU will start insurance authorization process.  They may not hear back till tomorrow.      Next Steps: discharge once insurance approval is received.  Ex wife will transport.    RUTH ANN Tompkins      Crescentic Advancement Flap Text: The defect edges were debeveled with a #15 scalpel blade.  Given the location of the defect and the proximity to free margins a crescentic advancement flap was deemed most appropriate.  Using a sterile surgical marker, the appropriate advancement flap was drawn incorporating the defect and placing the expected incisions within the relaxed skin tension lines where possible.    The area thus outlined was incised deep to adipose tissue with a #15 scalpel blade.  The skin margins were undermined to an appropriate distance in all directions utilizing iris scissors.

## 2025-05-28 NOTE — PROGRESS NOTES
[facility]  Gastroenterology Progress Note     Rakesh Carey MRN# [id number]   YOB: 1970 Age: 55 year old          Assessment and Plan:     Rakesh Carey is a 55 year old male with a past medical history of peptic ulcer disease, GERD, diverticulitis, anxiety, tobacco use, hypertension, and TBI who presents with progressive whole-body weakness/decreased appetite for 2 weeks. GI consulted for EtOH hepatitis.     Generalized weakness  Acute liver failure without hepatic coma  Norovirus  LFT quite consistent with EtOH pattern with AST//52, total bilirubin 3.6, and alkaline phosphatase 226. Ferritin level elevated 2432, iron index 88% so we will need to follow-up as outpatient for hemochromatosis but this is likely due to acute phase reactant at this time   CT noting hepatic steatosis, hepatomegaly, early cirrhosis, cholelithiasis. Urinary infection  Noted to have Norovirus  MDF 32 continue with prednisone 40 mg daily with weekly 10 mg taper   MELD 16  Patient also has underlying liver cirrhosis given ascites around the liver with low platelet count  Has hepatic encephalopathy- ammonia trending down to 74 with lactulose      - Less loose stools with lower dose of lactulose  continue with prednisone 40 mg daily with weekly 10 mg taper - continues to have imrpovement in LFTs and INR  - daily labs  - continue current treatment  - Alcohol cessation  - consider EGD and colonoscopy as outpatient      Rectal bleeding  Hgb stable  H/o hemorrhoids and irritated with diarrhea                Interval History:     no new complaints and has ongoing diarrhea 5 times a day watery. No abdominal pain. Tolerating diet              Review of Systems:     C: NEGATIVE for fever, chills, change in weight  E/M: NEGATIVE for ear, mouth and throat problems  R: NEGATIVE for significant cough or SOB  CV: NEGATIVE for chest pain, palpitations or peripheral edema             Medications:   I have  reviewed this patient's current medications  Current Facility-Administered Medications   Medication Dose Route Frequency Provider Last Rate Last Admin    carvedilol (COREG) tablet 25 mg  25 mg Oral BID w/meals Glenda Márquez MD   25 mg at 05/28/25 0851    cloNIDine (CATAPRES) tablet 0.1 mg  0.1 mg Oral BID Marilyn Jules MD   0.1 mg at 05/28/25 0851    folic acid (FOLVITE) tablet 1 mg  1 mg Oral Daily Glenda Márquez MD   1 mg at 05/28/25 0851    furosemide (LASIX) injection 10 mg  10 mg Intravenous Daily Enu-Dot Landry MD   10 mg at 05/28/25 0852    [START ON 5/30/2025] gabapentin (NEURONTIN) capsule 100 mg  100 mg Oral Q8H Glenda Márquez MD        gabapentin (NEURONTIN) capsule 300 mg  300 mg Oral Q8H Glenda Márquez MD   300 mg at 05/28/25 0851    hydrocortisone (CORTAID) 1 % cream   Topical BID Glenda Márquez MD   Given at 05/27/25 2142    lactulose (CHRONULAC) solution 20 g  20 g Oral Daily Marilyn Jules MD   20 g at 05/28/25 0903    multivitamin, therapeutic (THERA-VIT) tablet 1 tablet  1 tablet Oral Daily Glenda Márquez MD   1 tablet at 05/28/25 0852    pantoprazole (PROTONIX) EC tablet 40 mg  40 mg Oral BID Glenda Márquez MD   40 mg at 05/28/25 0851    prednisoLONE (ORAPRED) 15 MG/5 ML solution 40 mg  40 mg Oral Daily Glenda Márquez MD   40 mg at 05/28/25 0903    sodium chloride (PF) 0.9% PF flush 3 mL  3 mL Intracatheter Q8H DARLENE Glenda Márquez MD   3 mL at 05/27/25 2143    spironolactone (ALDACTONE) tablet 25 mg  25 mg Oral Daily Glenda Márquez MD   25 mg at 05/28/25 0851    thiamine (B-1) injection 250 mg  250 mg Intravenous Daily Stewart Wolff MD   250 mg at 05/28/25 0854    Followed by    [START ON 5/30/2025] thiamine (B-1) tablet 100 mg  100 mg Oral Daily Stewart Wolff MD                      Physical Exam:   Vitals were reviewed  Vital Signs with Ranges  Temp:  [97.8  F (36.6  C)-97.9  F (36.6  C)] 97.8  F (36.6   C)  Pulse:  [81-93] 93  Resp:  [16-20] 20  BP: (134-150)/(84-92) 150/92  SpO2:  [94 %-96 %] 96 %  I/O last 3 completed shifts:  In: 780 [P.O.:780]  Out: -   Constitutional: Alert, oriented to person, place, date, situation.  Cooperative, lying in bed in NAD.   Respiratory:  Lungs CTAB.  No crackles, wheezes, or rhonchi, no labored breathing.  Cardiovascular:  Heart RRR, no MRG, no edema.  GI:  Abdomen soft, NT/ND and with normoactive BS  Skin/Integumen:  Warm, dry, non-diaphoretic.  MSK: CMS x4 intact.             Data:   I reviewed the patient's new clinical lab test results.   Recent Labs   Lab Test 05/28/25 0722 05/27/25 0720 05/26/25  0555 05/25/25  1216   WBC 6.5 5.3  --  4.8   HGB 11.3* 10.9*  --  11.0*   * 101*  --  99   PLT 75* 69*  --  63*   INR 1.42* 1.48* 1.56* 1.61*     Recent Labs   Lab Test 05/28/25 0722 05/27/25 0720 05/26/25 0555 05/25/25  1216   POTASSIUM 4.7 4.4 4.3 4.0   CHLORIDE 101 100  --  99   CO2 27 29  --  24   BUN 12.0 9.9  --  8.3   ANIONGAP 9 7  --  11     Recent Labs   Lab Test 05/28/25 0722 05/27/25 0720 05/26/25 0555 05/23/25 0655 05/22/25  2314   ALBUMIN 2.9* 2.8* 2.7*   < >  --    BILITOTAL 2.5* 2.6* 2.6*   < >  --    ALT 51 52 52   < >  --    * 114* 140*   < >  --    PROTEIN  --   --   --   --  50*    < > = values in this interval not displayed.       I reviewed the patient's new imaging results.    All laboratory data reviewed  All imaging studies reviewed by me.    Argenis Velez PA-C,  5/27/2025  [facility]  Office : [phone]  Cell: [phone] (Dr. Garibay)  Cell: [phone] (Argenis Velez PA-C)

## 2025-05-29 VITALS
HEART RATE: 94 BPM | TEMPERATURE: 98.2 F | DIASTOLIC BLOOD PRESSURE: 92 MMHG | HEIGHT: 70 IN | BODY MASS INDEX: 45.1 KG/M2 | WEIGHT: 315 LBS | RESPIRATION RATE: 18 BRPM | SYSTOLIC BLOOD PRESSURE: 135 MMHG | OXYGEN SATURATION: 97 %

## 2025-05-29 LAB
ALBUMIN SERPL BCG-MCNC: 2.8 G/DL (ref 3.5–5.2)
ALP SERPL-CCNC: 173 U/L (ref 40–150)
ALT SERPL W P-5'-P-CCNC: 46 U/L (ref 0–70)
ANION GAP SERPL CALCULATED.3IONS-SCNC: 11 MMOL/L (ref 7–15)
ANION GAP SERPL CALCULATED.3IONS-SCNC: 9 MMOL/L (ref 7–15)
AST SERPL W P-5'-P-CCNC: 78 U/L (ref 0–45)
BILIRUB SERPL-MCNC: 2.1 MG/DL
BUN SERPL-MCNC: 14.9 MG/DL (ref 6–20)
BUN SERPL-MCNC: 15 MG/DL (ref 6–20)
CALCIUM SERPL-MCNC: 9.3 MG/DL (ref 8.8–10.4)
CALCIUM SERPL-MCNC: 9.4 MG/DL (ref 8.8–10.4)
CHLORIDE SERPL-SCNC: 99 MMOL/L (ref 98–107)
CHLORIDE SERPL-SCNC: 99 MMOL/L (ref 98–107)
CREAT SERPL-MCNC: 0.86 MG/DL (ref 0.67–1.17)
CREAT SERPL-MCNC: 0.88 MG/DL (ref 0.67–1.17)
EGFRCR SERPLBLD CKD-EPI 2021: >90 ML/MIN/1.73M2
EGFRCR SERPLBLD CKD-EPI 2021: >90 ML/MIN/1.73M2
ERYTHROCYTE [DISTWIDTH] IN BLOOD BY AUTOMATED COUNT: 19.3 % (ref 10–15)
GLUCOSE SERPL-MCNC: 117 MG/DL (ref 70–99)
GLUCOSE SERPL-MCNC: 117 MG/DL (ref 70–99)
HCO3 SERPL-SCNC: 26 MMOL/L (ref 22–29)
HCO3 SERPL-SCNC: 27 MMOL/L (ref 22–29)
HCT VFR BLD AUTO: 31.2 % (ref 40–53)
HGB BLD-MCNC: 10.5 G/DL (ref 13.3–17.7)
INR PPP: 1.41 (ref 0.85–1.15)
MAGNESIUM SERPL-MCNC: 1.6 MG/DL (ref 1.7–2.3)
MCH RBC QN AUTO: 33.9 PG (ref 26.5–33)
MCHC RBC AUTO-ENTMCNC: 33.7 G/DL (ref 31.5–36.5)
MCV RBC AUTO: 101 FL (ref 78–100)
PLATELET # BLD AUTO: 68 10E3/UL (ref 150–450)
POTASSIUM SERPL-SCNC: 4.7 MMOL/L (ref 3.4–5.3)
POTASSIUM SERPL-SCNC: 4.7 MMOL/L (ref 3.4–5.3)
PROT SERPL-MCNC: 6.5 G/DL (ref 6.4–8.3)
PROTHROMBIN TIME: 16.9 SECONDS (ref 11.8–14.8)
RBC # BLD AUTO: 3.1 10E6/UL (ref 4.4–5.9)
SODIUM SERPL-SCNC: 135 MMOL/L (ref 135–145)
SODIUM SERPL-SCNC: 136 MMOL/L (ref 135–145)
WBC # BLD AUTO: 6.1 10E3/UL (ref 4–11)

## 2025-05-29 PROCEDURE — 120N000001 HC R&B MED SURG/OB

## 2025-05-29 PROCEDURE — 82040 ASSAY OF SERUM ALBUMIN: CPT | Performed by: STUDENT IN AN ORGANIZED HEALTH CARE EDUCATION/TRAINING PROGRAM

## 2025-05-29 PROCEDURE — 99232 SBSQ HOSP IP/OBS MODERATE 35: CPT | Performed by: STUDENT IN AN ORGANIZED HEALTH CARE EDUCATION/TRAINING PROGRAM

## 2025-05-29 PROCEDURE — 250N000013 HC RX MED GY IP 250 OP 250 PS 637: Performed by: STUDENT IN AN ORGANIZED HEALTH CARE EDUCATION/TRAINING PROGRAM

## 2025-05-29 PROCEDURE — 36415 COLL VENOUS BLD VENIPUNCTURE: CPT | Performed by: PHYSICIAN ASSISTANT

## 2025-05-29 PROCEDURE — 85014 HEMATOCRIT: CPT | Performed by: STUDENT IN AN ORGANIZED HEALTH CARE EDUCATION/TRAINING PROGRAM

## 2025-05-29 PROCEDURE — 80048 BASIC METABOLIC PNL TOTAL CA: CPT | Performed by: STUDENT IN AN ORGANIZED HEALTH CARE EDUCATION/TRAINING PROGRAM

## 2025-05-29 PROCEDURE — 85610 PROTHROMBIN TIME: CPT | Performed by: PHYSICIAN ASSISTANT

## 2025-05-29 PROCEDURE — 250N000011 HC RX IP 250 OP 636: Performed by: STUDENT IN AN ORGANIZED HEALTH CARE EDUCATION/TRAINING PROGRAM

## 2025-05-29 PROCEDURE — 250N000011 HC RX IP 250 OP 636: Performed by: HOSPITALIST

## 2025-05-29 PROCEDURE — 83735 ASSAY OF MAGNESIUM: CPT | Performed by: INTERNAL MEDICINE

## 2025-05-29 PROCEDURE — 250N000012 HC RX MED GY IP 250 OP 636 PS 637: Performed by: STUDENT IN AN ORGANIZED HEALTH CARE EDUCATION/TRAINING PROGRAM

## 2025-05-29 PROCEDURE — 85041 AUTOMATED RBC COUNT: CPT | Performed by: STUDENT IN AN ORGANIZED HEALTH CARE EDUCATION/TRAINING PROGRAM

## 2025-05-29 RX ORDER — MAGNESIUM OXIDE 400 MG/1
400 TABLET ORAL EVERY 4 HOURS
Status: COMPLETED | OUTPATIENT
Start: 2025-05-29 | End: 2025-05-29

## 2025-05-29 RX ADMIN — OXYCODONE HYDROCHLORIDE 2.5 MG: 5 TABLET ORAL at 10:30

## 2025-05-29 RX ADMIN — PREDNISOLONE SODIUM PHOSPHATE 40 MG: 15 SOLUTION ORAL at 09:14

## 2025-05-29 RX ADMIN — FUROSEMIDE 10 MG: 10 INJECTION, SOLUTION INTRAVENOUS at 08:54

## 2025-05-29 RX ADMIN — OXYCODONE HYDROCHLORIDE 2.5 MG: 5 TABLET ORAL at 03:16

## 2025-05-29 RX ADMIN — MULTIVITAMIN TABLET 1 TABLET: TABLET at 08:55

## 2025-05-29 RX ADMIN — GABAPENTIN 300 MG: 300 CAPSULE ORAL at 08:55

## 2025-05-29 RX ADMIN — CLONIDINE HYDROCHLORIDE 0.1 MG: 0.1 TABLET ORAL at 21:05

## 2025-05-29 RX ADMIN — GABAPENTIN 300 MG: 300 CAPSULE ORAL at 17:50

## 2025-05-29 RX ADMIN — PANTOPRAZOLE SODIUM 40 MG: 40 TABLET, DELAYED RELEASE ORAL at 21:05

## 2025-05-29 RX ADMIN — CARVEDILOL 25 MG: 25 TABLET, FILM COATED ORAL at 17:50

## 2025-05-29 RX ADMIN — OXYCODONE HYDROCHLORIDE 2.5 MG: 5 TABLET ORAL at 17:56

## 2025-05-29 RX ADMIN — OXYCODONE HYDROCHLORIDE 2.5 MG: 5 TABLET ORAL at 06:29

## 2025-05-29 RX ADMIN — Medication 400 MG: at 08:53

## 2025-05-29 RX ADMIN — LACTULOSE 20 G: 20 SOLUTION ORAL at 08:53

## 2025-05-29 RX ADMIN — PANTOPRAZOLE SODIUM 40 MG: 40 TABLET, DELAYED RELEASE ORAL at 08:55

## 2025-05-29 RX ADMIN — FOLIC ACID 1 MG: 1 TABLET ORAL at 08:55

## 2025-05-29 RX ADMIN — HYDROCORTISONE: 10 CREAM TOPICAL at 21:07

## 2025-05-29 RX ADMIN — OXYCODONE HYDROCHLORIDE 2.5 MG: 5 TABLET ORAL at 00:11

## 2025-05-29 RX ADMIN — CLONIDINE HYDROCHLORIDE 0.1 MG: 0.1 TABLET ORAL at 08:55

## 2025-05-29 RX ADMIN — SPIRONOLACTONE 25 MG: 25 TABLET ORAL at 08:53

## 2025-05-29 RX ADMIN — OXYCODONE HYDROCHLORIDE 2.5 MG: 5 TABLET ORAL at 21:13

## 2025-05-29 RX ADMIN — Medication 400 MG: at 14:06

## 2025-05-29 RX ADMIN — CARVEDILOL 25 MG: 25 TABLET, FILM COATED ORAL at 08:55

## 2025-05-29 RX ADMIN — GABAPENTIN 300 MG: 300 CAPSULE ORAL at 00:11

## 2025-05-29 RX ADMIN — THIAMINE HYDROCHLORIDE 250 MG: 100 INJECTION, SOLUTION INTRAMUSCULAR; INTRAVENOUS at 08:54

## 2025-05-29 ASSESSMENT — ACTIVITIES OF DAILY LIVING (ADL)
ADLS_ACUITY_SCORE: 46

## 2025-05-29 NOTE — PROGRESS NOTES
[facility]    Medicine Progress Note - Hospitalist Service    Date of Admission:  5/22/2025    Assessment & Plan   Alcoholic Hepatitis  Alcoholic Cirrhosis, new diagnosis with Anasarca  Alcoholic Ketoacidosis  Hypoalbuminemia  Obesity  Deconditioning  Hepatic Encephalopathy  MELD 19, Madrey score 32,   CT showed Hepatic steatosis, hepatomegaly, early cirrhosis, and small volume ascites   --GI consult: Input appreciated  --LFTs trending low.  -- Continue to hold statin in setting of liver injury.  -- Continue prednisolone 40mg daily, GI managing.  -- Continue Aldactone 25 mg and Lasix 10mg   --PT eval: Home with home PT  --Fluid restriction  -- lactulose daily   -Repeat Ultrasound shows minimal ascites   -Lfts improving , Bilirubin improving   -Discussed with gastroenterology and to continue Prednisonolone taper on discharge      Norovirus infection Resolved  -- Continue supportive care  - Norovirus infection resolved patient continues to have diarrhea due to lactulose     Distended gallbladder with gallbladder sludge and mild wall thickening   Hemorroidal rectal bleeding  No RUQ tenderness, ALT wnl at 62 while  likely from ETOH use  In regards to Vomiting/diarrhea, could be enteritis due to noro virus  - Ultrasound 5/22/25  Borderline wall thickness. Trace pericholecystic/perihepatic fluid. No sonographic Alaniz sign.  -Continue to monitor        LARISA Resolved  Likely 2/2 renal congestion  -Resolved  -Continue Lasix and aldactone       Alcohol Use w/ active Wtihdrawal  GERD  Last drink 1 day PTA. No hx prior withdrawal  --thiamine/folate/gabapentin/clonidine ordered in conjugction with CIWA protocol  --CD ordered per patient preference. He initially declined services; now agrees to rehab so they have been re-consulted.   --resumed PTA PPI  -Not scoring currently on CIWA   -Clonidine tapered down to 0.1 mg twice daily     Hypomagnesemia  Hypokalemia  --telemetry  --RN based repletion  "ordered  --Hold PTA hydrochlorothiazide     Anemia  Thrombocytopenia  Likely 2/2 liver disease  --trend Hb/plts  -Platelet count stable at 75   -Hemoglobin stable at 11.3      HTN  HLD  --hold statin given hepatitis  --resumed coreg but not hydrochlorothiazide given hypokalemia     5 cm complex left renal cyst  --outpatient follow-up w/ renal mass CT  -Urology referral     Chronic back pain   Lumbar spolndylosis W/ radiculopathy  S/p B/l L5-S1 TFESI 1/2025  --max tylenol 2g  --Low dose oxy only given cirrhosis             Diet: Combination Diet Low Saturated Fat Na <2400mg Diet, No Caffeine Diet  Fluid restriction 1800 ML FLUID  Snacks/Supplements Adult: Ensure Enlive; With Meals    DVT Prophylaxis: Pneumatic Compression Devices  Infante Catheter: Not present  Lines: None     Cardiac Monitoring: ACTIVE order. Indication: Electrolyte Imbalance (24 hours)- Magnesium <1.3 mg/ml; Potassium < =2.8 or > 5.5 mg/ml  Code Status: Full Code      Clinically Significant Risk Factors             # Hypomagnesemia: Lowest Mg = 1.6 mg/dL in last 2 days, will replace as needed   # Hypoalbuminemia: Lowest albumin = 2.6 g/dL at 5/23/2025  6:55 AM, will monitor as appropriate  # Coagulation Defect: INR = 1.41 (Ref range: 0.85 - 1.15) and/or PTT = N/A, will monitor for bleeding  # Thrombocytopenia: Lowest platelets = 68 in last 2 days, will monitor for bleeding   # Hypertension: Noted on problem list            # Morbid Obesity: Estimated body mass index is 46.85 kg/m  as calculated from the following:    Height as of this encounter: 1.778 m (5' 10\").    Weight as of this encounter: 148.1 kg (326 lb 8 oz).             Social Drivers of Health    Tobacco Use: Medium Risk (8/13/2024)    Patient History     Smoking Tobacco Use: Former     Smokeless Tobacco Use: Never   Physical Activity: Inactive (8/13/2024)    Exercise Vital Sign     Days of Exercise per Week: 0 days     Minutes of Exercise per Session: 0 min   Stress: Stress Concern " Present (8/13/2024)    [employer] - Occupational Stress Questionnaire     Feeling of Stress : Rather much   Social Connections: Unknown (8/13/2024)    Social Connection and Isolation Panel [NHANES]     Frequency of Social Gatherings with Friends and Family: Three times a week          Disposition Plan     Medically Ready for Discharge: Ready Now             Marilyn Jules MD  Hospitalist Service  [facility]  Securely message with [employer] (more info)  Text page via [employer] Paging/Directory   ______________________________________________________________________    Interval History   No acute overnight events.  Patient seen and examined at bedside.  Patient overwhelmed with FMLA paperwork.  FMLA papers signed.  Also discussed with patient about renal cyst.  And discussed about outpatient urology follow-up.    Physical Exam   Vital Signs: Temp: 98.7  F (37.1  C) Temp src: Oral BP: (!) 146/75 Pulse: 86   Resp: 18 SpO2: 96 % O2 Device: None (Room air)    Weight: 326 lbs 8.02 oz    Physical Exam  Cardiovascular:      Rate and Rhythm: Normal rate and regular rhythm.   Pulmonary:      Effort: Pulmonary effort is normal. No respiratory distress.      Breath sounds: Normal breath sounds.   Abdominal:      General: There is no distension.      Palpations: Abdomen is soft.      Tenderness: There is no abdominal tenderness.          Medical Decision Making       35 MINUTES SPENT BY ME on the date of service doing chart review, history, exam, documentation & further activities per the note.      Data     I have personally reviewed the following data over the past 24 hrs:    6.1  \   10.5 (L)   / 68 (L)     135; 136 99; 99 15.0; 14.9 /  117 (H); 117 (H)   4.7; 4.7 27; 26 0.86; 0.88 \     ALT: 46 AST: 78 (H) AP: 173 (H) TBILI: 2.1 (H)   ALB: 2.8 (L) TOT PROTEIN: 6.5 LIPASE: N/A     INR:  1.41 (H) PTT:  N/A   D-dimer:  N/A Fibrinogen:  N/A       Imaging results  reviewed over the past 24 hrs:   No results found for this or any previous visit (from the past 24 hours).

## 2025-05-29 NOTE — PLAN OF CARE
Goal Outcome Evaluation:       Shift Summary [id number]    Admitting Diagnosis: Hypokalemia [E87.6]  Hypomagnesemia [E83.42]  Generalized weakness [R53.1]  Severe obesity (BMI >= 40) (H) [E66.01]  Acute liver failure without hepatic coma [K72.00]   Vitals WNL  Pain 9/10. Taking oxy PRN  A&Ox4  Voiding WNL  Mobility IND in the room  Tele SR  CMS intact  Lung Sounds clear on RA  GI WNL    Orders Placed This Encounter      Combination Diet Low Saturated Fat Na <2400mg Diet, No Caffeine Diet       Plan: discharge to TCU pending insurance auth, pt is medically ready.

## 2025-05-29 NOTE — PROGRESS NOTES
Care Management Follow Up    Length of Stay (days): 6    Expected Discharge Date: 05/29/2025     Concerns to be Addressed: discharge planning     Patient plan of care discussed at interdisciplinary rounds: Yes    Anticipated Discharge Disposition: Transitional Care              Anticipated Discharge Services:    Anticipated Discharge DME:      Patient/family educated on Medicare website which has current facility and service quality ratings:    Education Provided on the Discharge Plan: Yes  Patient/Family in Agreement with the Plan: yes    Referrals Placed by CM/SW: Post Acute Facilities  Private pay costs discussed: transportation costs    Discussed  Partnership in Safe Discharge Planning  document with patient/family: Yes: verbally     Handoff Completed:     Additional Information:  Appears GI has signed off.  Waiting to hear from [facility] that they have received [facility] SN[facility] approval.  Tentative transport set up for the later afternoon and their first available ride for today by [employer] is  1720 to 1800.    Next Steps: Await to hear back from TCU and from Dr Jules that patient is medically ready for discharge.  Will meet with patient this morning to update him.    Update at 1540  [facility] has not heard back from [facility].  Patient will be here overnight.  Writer updated patient and is friend who is here and was going to transport him. His friend will be able to transport him tomorrow afternoon, if insurance authorization is received     RUTH ANN Tompkins

## 2025-05-29 NOTE — PLAN OF CARE
Goal Outcome Evaluation:      Plan of Care Reviewed With: patient    Shift Summary [id number]    Admitting Diagnosis: Hypokalemia [E87.6]  Hypomagnesemia [E83.42]  Generalized weakness [R53.1]  Severe obesity (BMI >= 40) (H) [E66.01]  Acute liver failure without hepatic coma [K72.00]     Mental Status: A/Ox4.   Activity/dangle: S/B/A, refuses chuy alarm.   Diet: low fat/low NA/no caffeine  Pain: 7/10, low back pain, managed with scheduled meds and prn oxy x3.   Infante/Voiding: continent to the BR.   Tele/Restraints/Iso: Tele: SR.   02/LDA: RA. L PIV:YASIR.   D/C Date: TBD.     Other Info: mag, K+ protocol, recheck 6 AM today, result pending. Scattered bruises. Scan to L knee. Pt calls appropriately to make needs known.   Discharge to [facility] likely today pending insurance authorization.     Vital signs:  Temp: 98.2  F (36.8  C) Temp src: Oral BP: (!) 145/82 Pulse: 85   Resp: 18 SpO2: 96 % O2 Device: None (Room air)

## 2025-05-29 NOTE — PROGRESS NOTES
[facility]  Gastroenterology Progress Note     Rakesh Carey MRN# [id number]   YOB: 1970 Age: 55 year old          Assessment and Plan:     Rakesh Carey is a 55 year old male with a past medical history of peptic ulcer disease, GERD, diverticulitis, anxiety, tobacco use, hypertension, and TBI who presents with progressive whole-body weakness/decreased appetite for 2 weeks. GI consulted for EtOH hepatitis.     Generalized weakness  Acute liver failure without hepatic coma  Norovirus  LFT quite consistent with EtOH pattern with AST//52, total bilirubin 3.6, and alkaline phosphatase 226. Ferritin level elevated 2432, iron index 88% so we will need to follow-up as outpatient for hemochromatosis but this is likely due to acute phase reactant at this time   CT noting hepatic steatosis, hepatomegaly, early cirrhosis, cholelithiasis. Urinary infection  Noted to have Norovirus  MDF 32 continue with prednisone 40 mg daily with weekly 10 mg taper   MELD 16  Patient also has underlying liver cirrhosis given ascites around the liver with low platelet count  Has hepatic encephalopathy- ammonia trending down to 74 with lactulose      - Less loose stools with lower dose of lactulose  - continue with prednisolone 40 mg daily with weekly 10 mg taper - continues to have improvement in LFTs and INR.   - daily labs  - continue current treatment  - Alcohol cessation  - consider EGD and colonoscopy as outpatient  - Ok to discharge from GI standpoint  - GI will sign off service. Please contact [facility] if any further GI service needed.   - Follow-up with [facility] in 1-2 weeks after discharge    Rectal bleeding  Hgb stable  H/o hemorrhoids and irritated with diarrhea                Interval History:     no new complaints and has ongoing diarrhea 5 times a day watery. No abdominal pain. Tolerating diet              Review of Systems:     C: NEGATIVE for fever, chills, change in  weight  E/M: NEGATIVE for ear, mouth and throat problems  R: NEGATIVE for significant cough or SOB  CV: NEGATIVE for chest pain, palpitations or peripheral edema             Medications:   I have reviewed this patient's current medications  Current Facility-Administered Medications   Medication Dose Route Frequency Provider Last Rate Last Admin    carvedilol (COREG) tablet 25 mg  25 mg Oral BID w/meals Glenda Márquez MD   25 mg at 05/28/25 1743    cloNIDine (CATAPRES) tablet 0.1 mg  0.1 mg Oral BID Marilyn Jules MD   0.1 mg at 05/28/25 2105    folic acid (FOLVITE) tablet 1 mg  1 mg Oral Daily Glenda Márquez MD   1 mg at 05/28/25 0851    furosemide (LASIX) injection 10 mg  10 mg Intravenous Daily KrisuDot Oliver MD   10 mg at 05/28/25 0852    [START ON 5/30/2025] gabapentin (NEURONTIN) capsule 100 mg  100 mg Oral Q8H Glenda Márquez MD        gabapentin (NEURONTIN) capsule 300 mg  300 mg Oral Q8H Glenda Márquez MD   300 mg at 05/29/25 0011    hydrocortisone (CORTAID) 1 % cream   Topical BID Glenda Márquez MD   Given at 05/28/25 2105    lactulose (CHRONULAC) solution 20 g  20 g Oral Daily Marilyn Jules MD   20 g at 05/28/25 0903    magnesium oxide (MAG-OX) tablet 400 mg  400 mg Oral Q4H Marilyn Jules MD        multivitamin, therapeutic (THERA-VIT) tablet 1 tablet  1 tablet Oral Daily Glenda Márquez MD   1 tablet at 05/28/25 0852    pantoprazole (PROTONIX) EC tablet 40 mg  40 mg Oral BID Glenda Márquez MD   40 mg at 05/28/25 2105    prednisoLONE (ORAPRED) 15 MG/5 ML solution 40 mg  40 mg Oral Daily Glenda Márquez MD   40 mg at 05/28/25 0903    sodium chloride (PF) 0.9% PF flush 3 mL  3 mL Intracatheter Q8H DARLENE Glenda Márquez MD   3 mL at 05/29/25 0629    spironolactone (ALDACTONE) tablet 25 mg  25 mg Oral Daily Glenda Márquez MD   25 mg at 05/28/25 0851    thiamine (B-1) injection 250 mg  250 mg Intravenous Daily New  MD Stewart   250 mg at 05/28/25 0854    Followed by    [START ON 5/30/2025] thiamine (B-1) tablet 100 mg  100 mg Oral Daily Stewart Wolff MD                      Physical Exam:   Vitals were reviewed  Vital Signs with Ranges  Temp:  [97.9  F (36.6  C)-98.4  F (36.9  C)] 97.9  F (36.6  C)  Pulse:  [82-86] 84  Resp:  [18] 18  BP: (121-149)/(77-92) 140/88  SpO2:  [94 %-97 %] 95 %  I/O last 3 completed shifts:  In: 680 [P.O.:680]  Out: -   Constitutional: Alert, oriented to person, place, date, situation.  Cooperative, lying in bed in NAD.   Respiratory:  Lungs CTAB.  No crackles, wheezes, or rhonchi, no labored breathing.  Cardiovascular:  Heart RRR, no MRG, no edema.  GI:  Abdomen soft, NT/ND and with normoactive BS  Skin/Integumen:  Warm, dry, non-diaphoretic.  MSK: CMS x4 intact.             Data:   I reviewed the patient's new clinical lab test results.   Recent Labs   Lab Test 05/29/25 0715 05/28/25 0722 05/27/25 0720   WBC 6.1 6.5 5.3   HGB 10.5* 11.3* 10.9*   * 103* 101*   PLT 68* 75* 69*   INR 1.41* 1.42* 1.48*     Recent Labs   Lab Test 05/29/25 0715 05/28/25 0722 05/27/25  0720   POTASSIUM 4.7  4.7 4.7 4.4   CHLORIDE 99  99 101 100   CO2 26 27 27 29   BUN 14.9  15.0 12.0 9.9   ANIONGAP 11  9 9 7     Recent Labs   Lab Test 05/29/25 0715 05/28/25  0722 05/27/25  0720 05/23/25  0655 05/22/25  2314   ALBUMIN 2.8* 2.9* 2.8*   < >  --    BILITOTAL 2.1* 2.5* 2.6*   < >  --    ALT 46 51 52   < >  --    AST 78* 100* 114*   < >  --    PROTEIN  --   --   --   --  50*    < > = values in this interval not displayed.       I reviewed the patient's new imaging results.    All laboratory data reviewed  All imaging studies reviewed by me.    Argenis Velez PA-C,  5/27/2025  [facility]  Office : [phone]  Cell: [phone] (Dr. Garibay)  Cell: [phone] (Argenis Velez PA-C)

## 2025-05-30 ENCOUNTER — APPOINTMENT (OUTPATIENT)
Dept: PHYSICAL THERAPY | Facility: CLINIC | Age: 55
End: 2025-05-30
Payer: COMMERCIAL

## 2025-05-30 LAB
ANION GAP SERPL CALCULATED.3IONS-SCNC: 8 MMOL/L (ref 7–15)
BUN SERPL-MCNC: 15.1 MG/DL (ref 6–20)
CALCIUM SERPL-MCNC: 9.9 MG/DL (ref 8.8–10.4)
CHLORIDE SERPL-SCNC: 100 MMOL/L (ref 98–107)
CREAT SERPL-MCNC: 0.84 MG/DL (ref 0.67–1.17)
EGFRCR SERPLBLD CKD-EPI 2021: >90 ML/MIN/1.73M2
ERYTHROCYTE [DISTWIDTH] IN BLOOD BY AUTOMATED COUNT: 19.2 % (ref 10–15)
GLUCOSE SERPL-MCNC: 117 MG/DL (ref 70–99)
HCO3 SERPL-SCNC: 29 MMOL/L (ref 22–29)
HCT VFR BLD AUTO: 35.1 % (ref 40–53)
HGB BLD-MCNC: 11.2 G/DL (ref 13.3–17.7)
MAGNESIUM SERPL-MCNC: 1.7 MG/DL (ref 1.7–2.3)
MCH RBC QN AUTO: 33.1 PG (ref 26.5–33)
MCHC RBC AUTO-ENTMCNC: 31.9 G/DL (ref 31.5–36.5)
MCV RBC AUTO: 104 FL (ref 78–100)
PLATELET # BLD AUTO: 77 10E3/UL (ref 150–450)
POTASSIUM SERPL-SCNC: 4.7 MMOL/L (ref 3.4–5.3)
RBC # BLD AUTO: 3.38 10E6/UL (ref 4.4–5.9)
SODIUM SERPL-SCNC: 137 MMOL/L (ref 135–145)
WBC # BLD AUTO: 5.8 10E3/UL (ref 4–11)

## 2025-05-30 PROCEDURE — 99231 SBSQ HOSP IP/OBS SF/LOW 25: CPT | Performed by: STUDENT IN AN ORGANIZED HEALTH CARE EDUCATION/TRAINING PROGRAM

## 2025-05-30 PROCEDURE — 250N000013 HC RX MED GY IP 250 OP 250 PS 637: Performed by: STUDENT IN AN ORGANIZED HEALTH CARE EDUCATION/TRAINING PROGRAM

## 2025-05-30 PROCEDURE — 250N000011 HC RX IP 250 OP 636: Performed by: HOSPITALIST

## 2025-05-30 PROCEDURE — 84132 ASSAY OF SERUM POTASSIUM: CPT | Performed by: STUDENT IN AN ORGANIZED HEALTH CARE EDUCATION/TRAINING PROGRAM

## 2025-05-30 PROCEDURE — 82947 ASSAY GLUCOSE BLOOD QUANT: CPT | Performed by: STUDENT IN AN ORGANIZED HEALTH CARE EDUCATION/TRAINING PROGRAM

## 2025-05-30 PROCEDURE — 85027 COMPLETE CBC AUTOMATED: CPT | Performed by: STUDENT IN AN ORGANIZED HEALTH CARE EDUCATION/TRAINING PROGRAM

## 2025-05-30 PROCEDURE — 36415 COLL VENOUS BLD VENIPUNCTURE: CPT | Performed by: STUDENT IN AN ORGANIZED HEALTH CARE EDUCATION/TRAINING PROGRAM

## 2025-05-30 PROCEDURE — 120N000001 HC R&B MED SURG/OB

## 2025-05-30 PROCEDURE — 97116 GAIT TRAINING THERAPY: CPT | Mod: GP

## 2025-05-30 PROCEDURE — 83735 ASSAY OF MAGNESIUM: CPT | Performed by: INTERNAL MEDICINE

## 2025-05-30 PROCEDURE — 250N000012 HC RX MED GY IP 250 OP 636 PS 637: Performed by: STUDENT IN AN ORGANIZED HEALTH CARE EDUCATION/TRAINING PROGRAM

## 2025-05-30 RX ORDER — CLONIDINE HYDROCHLORIDE 0.1 MG/1
0.1 TABLET ORAL DAILY
Status: DISCONTINUED | OUTPATIENT
Start: 2025-05-31 | End: 2025-06-01

## 2025-05-30 RX ORDER — FUROSEMIDE 20 MG/1
20 TABLET ORAL DAILY
DISCHARGE
Start: 2025-05-30 | End: 2025-06-01

## 2025-05-30 RX ORDER — LACTULOSE 10 G/15ML
20 SOLUTION ORAL DAILY
DISCHARGE
Start: 2025-05-31

## 2025-05-30 RX ORDER — FOLIC ACID 1 MG/1
1 TABLET ORAL DAILY
DISCHARGE
Start: 2025-05-31 | End: 2025-06-01

## 2025-05-30 RX ORDER — LANOLIN ALCOHOL/MO/W.PET/CERES
100 CREAM (GRAM) TOPICAL DAILY
DISCHARGE
Start: 2025-05-31 | End: 2025-06-01

## 2025-05-30 RX ORDER — SPIRONOLACTONE 25 MG/1
25 TABLET ORAL DAILY
DISCHARGE
Start: 2025-05-31 | End: 2025-06-01

## 2025-05-30 RX ORDER — GABAPENTIN 100 MG/1
100 CAPSULE ORAL 3 TIMES DAILY
DISCHARGE
Start: 2025-05-30 | End: 2025-06-01

## 2025-05-30 RX ORDER — PREDNISOLONE SODIUM PHOSPHATE 15 MG/5ML
SOLUTION ORAL
DISCHARGE
Start: 2025-05-31 | End: 2025-06-21

## 2025-05-30 RX ORDER — OXYCODONE HYDROCHLORIDE 5 MG/1
2.5 TABLET ORAL EVERY 8 HOURS PRN
Qty: 9 TABLET | Refills: 0 | Status: SHIPPED | OUTPATIENT
Start: 2025-05-30 | End: 2025-06-06

## 2025-05-30 RX ORDER — FUROSEMIDE 20 MG/1
20 TABLET ORAL DAILY
Status: DISCONTINUED | OUTPATIENT
Start: 2025-05-30 | End: 2025-06-02 | Stop reason: HOSPADM

## 2025-05-30 RX ADMIN — FOLIC ACID 1 MG: 1 TABLET ORAL at 09:02

## 2025-05-30 RX ADMIN — FUROSEMIDE 20 MG: 20 TABLET ORAL at 10:52

## 2025-05-30 RX ADMIN — MULTIVITAMIN TABLET 1 TABLET: TABLET at 09:02

## 2025-05-30 RX ADMIN — CARVEDILOL 25 MG: 25 TABLET, FILM COATED ORAL at 17:00

## 2025-05-30 RX ADMIN — PANTOPRAZOLE SODIUM 40 MG: 40 TABLET, DELAYED RELEASE ORAL at 09:02

## 2025-05-30 RX ADMIN — CARVEDILOL 25 MG: 25 TABLET, FILM COATED ORAL at 09:02

## 2025-05-30 RX ADMIN — THIAMINE HCL TAB 100 MG 100 MG: 100 TAB at 09:02

## 2025-05-30 RX ADMIN — PANTOPRAZOLE SODIUM 40 MG: 40 TABLET, DELAYED RELEASE ORAL at 20:00

## 2025-05-30 RX ADMIN — CLONIDINE HYDROCHLORIDE 0.1 MG: 0.1 TABLET ORAL at 09:02

## 2025-05-30 RX ADMIN — PREDNISOLONE SODIUM PHOSPHATE 40 MG: 15 SOLUTION ORAL at 09:05

## 2025-05-30 RX ADMIN — SPIRONOLACTONE 25 MG: 25 TABLET ORAL at 09:02

## 2025-05-30 RX ADMIN — OXYCODONE HYDROCHLORIDE 2.5 MG: 5 TABLET ORAL at 01:10

## 2025-05-30 RX ADMIN — OXYCODONE HYDROCHLORIDE 2.5 MG: 5 TABLET ORAL at 20:00

## 2025-05-30 RX ADMIN — GABAPENTIN 100 MG: 100 CAPSULE ORAL at 16:55

## 2025-05-30 RX ADMIN — OXYCODONE HYDROCHLORIDE 2.5 MG: 5 TABLET ORAL at 16:56

## 2025-05-30 RX ADMIN — OXYCODONE HYDROCHLORIDE 2.5 MG: 5 TABLET ORAL at 04:45

## 2025-05-30 RX ADMIN — OXYCODONE HYDROCHLORIDE 2.5 MG: 5 TABLET ORAL at 09:02

## 2025-05-30 RX ADMIN — CALCIUM CARBONATE (ANTACID) CHEW TAB 500 MG 1000 MG: 500 CHEW TAB at 09:05

## 2025-05-30 RX ADMIN — GABAPENTIN 100 MG: 100 CAPSULE ORAL at 09:02

## 2025-05-30 RX ADMIN — LACTULOSE 20 G: 20 SOLUTION ORAL at 09:03

## 2025-05-30 RX ADMIN — GABAPENTIN 300 MG: 300 CAPSULE ORAL at 01:10

## 2025-05-30 ASSESSMENT — ACTIVITIES OF DAILY LIVING (ADL)
ADLS_ACUITY_SCORE: 45
ADLS_ACUITY_SCORE: 46
ADLS_ACUITY_SCORE: 45
ADLS_ACUITY_SCORE: 46
ADLS_ACUITY_SCORE: 45
ADLS_ACUITY_SCORE: 46
ADLS_ACUITY_SCORE: 45

## 2025-05-30 NOTE — PROGRESS NOTES
Care Management Follow Up    Length of Stay (days): 7    Expected Discharge Date: 05/30/2025     Concerns to be Addressed: discharge planning     Patient plan of care discussed at interdisciplinary rounds: Yes    Anticipated Discharge Disposition: Transitional Care              Anticipated Discharge Services:    Anticipated Discharge DME:      Patient/family educated on Medicare website which has current facility and service quality ratings:    Education Provided on the Discharge Plan: Yes  Patient/Family in Agreement with the Plan: yes    Referrals Placed by CM/SW: Post Acute Facilities  Private pay costs discussed: Not applicable    Discussed  Partnership in Safe Discharge Planning  document with patient/family: Yes: verbally     Handoff Completed: No, handoff not indicated or clinically appropriate    Additional Information:  [facility] has not heard from patient's insurance. They will place a call this morning.    In relation to residential CD treatment post TCU, writer followed up with [facility] (Lee Zamudio at [phone]).  He reports his peer Aki interviewed patient and patient has been accepted by [facility] once he is independent with ADL's and independent with ambulation.    There are no stairs in their builiding.  The next confirmed vacancy they have is June 9th however likely will have a vacancy before then as some clients leave before they have completed the program and never show up for admission next week.    Writer spoke with [facility] admission at [phone].  They report they called patient to complete the interview however he said he another program he is going to.    Writer met with patient and asked that he complete a phone interview with Lani as his back up plan, in the event [facility] doesn't have a vacancy when he discharged from the TCU.  Patient affirms he wants to go directly to a program from the TCU.  Patient given the number for [facility] admissions  and will call them this morning.   Next Steps: Wait to hear from [facility].    Patient has a friend who will transport.    Update at 1300.   Patient spoke with [facility] and completed the interview. He said they offer him admission as soon as Monday at their [place] program. Writer spoke with César in [facility] admissions [phone].  He confirms this information however their business office now is checking with his insurance for benefits.      [facility] at [facility] liaison, Carmina, shares their insurance staff called [employer] today and [employer] stated they have up to 14 days to review a patient for SNF benefits. [employer] is aware patient is in the hospital and medically ready for discharge. The referral was started by [facility] on 5/28.    Update Dr Jules and patient.  For now writer is requesting nursing walk with patient multiple times dailyi and will ask therapy if they can schedule patient on the w/e.  If we don't hear back from [employer] today likely will not hear anything till Monday.   If patient rehabs here we will want to assist with direct admission to either [facility] or [facility].      Vita Robison, PARVEENSW

## 2025-05-30 NOTE — PLAN OF CARE
Goal Outcome Evaluation:  Summary: Vomiting, Diarrhea, Weakness, ETOH   DATE & TIME: 05/30/35 [id number]  Cognitive Concerns/ Orientation : A&Ox4, calm, cooperative and very pleasant.  BEHAVIOR & AGGRESSION TOOL COLOR: Green  CIWA SCORE: 2, 0, 0  ABNL VS/O2: VSS on RA  MOBILITY: Independent. Encouraged to ambulate in halls.  PAIN MANAGMENT: C/o of chronic low back pain - gave oxycodone x2  DIET: Cardiac, FR 1800 ml   BOWEL/BLADDER: Continent   ABNL LAB/BG: See results  DRAIN/DEVICES: IV removed  TELEMETRY RHYTHM: Discontinued  SKIN: Large bruise on right hip/flank, galindo BLE, scab to L knee  TESTS/PROCEDURES: NA  D/C DATE/GOALS/PLACE: Discharge to [facility] TCU pending insurance authorization. Likely Monday  OTHER IMPORTANT INFO:

## 2025-05-30 NOTE — DISCHARGE SUMMARY
"[facility]    Hospitalist Discharge Summary       Date of Admission:  5/22/2025  Date of Discharge:  No discharge date for patient encounter.  Discharging Provider: Marilyn Jules MD      Discharge Diagnoses   ***  -Transition of care    -Incidental Finding  Follow-ups Needed After Discharge   Follow-up Appointments       Follow Up and recommended labs and tests      Follow up with [facility] physician.  The following labs/tests are recommended: cbc ,  bmp .and LFTS            {Additional follow-up instructions/to-do's for PCP    :***}    Unresulted Labs Ordered in the Past 30 Days of this Admission       No orders found from 4/22/2025 to 5/23/2025.        These results will be followed up by ***    Hospital Course   {OPTIONAL -- use to select A&P section   :253813}    Consultations This Hospital Stay   CARE MANAGEMENT / SOCIAL WORK IP CONSULT  CHEMICAL DEPENDENCY IP CONSULT  GASTROENTEROLOGY IP CONSULT  PHYSICAL THERAPY ADULT IP CONSULT  PHYSICAL THERAPY ADULT IP CONSULT  CARE MANAGEMENT / SOCIAL WORK IP CONSULT  VASCULAR ACCESS ADULT IP CONSULT  CHEMICAL DEPENDENCY IP CONSULT  CARE MANAGEMENT / SOCIAL WORK IP CONSULT  PHYSICAL THERAPY ADULT IP CONSULT  OCCUPATIONAL THERAPY ADULT IP CONSULT    Code Status   Full Code    Time Spent on this Encounter   I,Marilyn Jules, personally saw the patient today and spent approximately *** minutes discharging this patient.       Marilyn Jules MD  [facility]  ______________________________________________________________________    Physical Exam   Vital Signs: Temp: 98.1  F (36.7  C) Temp src: Oral BP: 128/72 Pulse: 85   Resp: 18 SpO2: 93 % O2 Device: None (Room air)    Weight: 326 lbs 8.02 oz    Physical Exam      Primary Care Physician   Carrington Rowe    Discharge Disposition   { :6646129::\"Discharged to home\"}  Condition at discharge: {CONDITION:002593::\"Stable\"}    Significant " Results and Procedures   Most Recent 3 CBC's:  Recent Labs   Lab Test 05/30/25  0704 05/29/25  0715 05/28/25  0722   WBC 5.8 6.1 6.5   HGB 11.2* 10.5* 11.3*   * 101* 103*   PLT 77* 68* 75*     Most Recent 3 BMP's:  Recent Labs   Lab Test 05/30/25  0704 05/29/25  0715 05/28/25  0722    136  135 137   POTASSIUM 4.7 4.7  4.7 4.7   CHLORIDE 100 99  99 101   CO2 29 26  27 27   BUN 15.1 14.9  15.0 12.0   CR 0.84 0.88  0.86 0.88   ANIONGAP 8 11  9 9   CINDA 9.9 9.3  9.4 9.2   * 117*  117* 113*     Most Recent 2 LFT's:  Recent Labs   Lab Test 05/29/25  0715 05/28/25  0722   AST 78* 100*   ALT 46 51   ALKPHOS 173* 204*   BILITOTAL 2.1* 2.5*     Most Recent 3 INR's:  Recent Labs   Lab Test 05/29/25  0715 05/28/25  0722 05/27/25  0720   INR 1.41* 1.42* 1.48*     Most Recent 3 Troponin's:No lab results found.  Most Recent 3 BNP's:No lab results found.  Most Recent D-dimer:No lab results found.  Most Recent Cholesterol Panel:  Recent Labs   Lab Test 08/13/24  1618   CHOL 193   *   HDL 40   TRIG 125       Results for orders placed or performed during the hospital encounter of 05/22/25   CT Abdomen Pelvis w Contrast    Narrative    EXAM: CT ABDOMEN PELVIS W CONTRAST  LOCATION: [facility]  DATE: 5/22/2025    INDICATION: Assess for ascites.  COMPARISON: No prior abdomen or pelvis CT are available for review at time of dictation.  TECHNIQUE: CT scan of the abdomen and pelvis was performed following injection of IV contrast. Multiplanar reformats were obtained. Dose reduction techniques were used.  CONTRAST: 135 mL Isovue 370    FINDINGS:   LOWER CHEST: Normal.    HEPATOBILIARY: Diffuse severe hepatic steatosis and hepatomegaly (24 cm craniocaudal) with widened hepatic fissure, widened periportal space, and recanalized periumbilical veins, suggesting early changes of cirrhosis. Small volume simple density   nonloculated ascites throughout all 4 abdominal quadrants.  Nonthrombosed portal venous system. Gallbladder distention with probable layering gallstones. If the patient has pain in the right upper quadrant, consider right upper quadrant ultrasound to   assess for cholecystitis.    PANCREAS: Normal.    SPLEEN: Normal.    ADRENAL GLANDS: Normal.    KIDNEYS/BLADDER: Several simple appearing bilateral renal cysts, but there is a more complex appearing nearly 5 cm cyst in the mid left kidney (series 3, image 41) with possible internal septation with enhancing septal nodularity (image 40).    BOWEL: No gastric or bowel distention. Normal appendix. Diverticulosis coli without evidence of acute diverticulitis, but the presence of generalized third spacing and ascites will reduce the sensitivity for diverticulitis. No pneumoperitoneum.    LYMPH NODES: Normal.    VASCULATURE: Normal.    PELVIC ORGANS: Possible inflammation surrounding the urinary bladder. Correlate with urinalysis.    MUSCULOSKELETAL: Normal.      Impression    IMPRESSION:   1.  Hepatic steatosis, hepatomegaly, early cirrhosis, and small volume ascites.  2.  Gallbladder distention with probable cholelithiasis. If there is suspicion for acute cholecystitis, consider right upper quadrant ultrasound.  3.  Possible urinary bladder infection. Correlate with urinalysis.  4.  Nearly 5 cm complex left renal cyst. On an elective nonemergent basis, this warrants renal mass protocol CT.   US Abdomen Limited    Narrative    EXAM: US ABDOMEN LIMITED  LOCATION: [facility]  DATE: 5/22/2025    INDICATION: gallbladder thickening on us  COMPARISON: 5/22/2025  TECHNIQUE: Limited abdominal ultrasound.    FINDINGS:    GALLBLADDER: Gallbladder sludge without stones. Borderline wall thickness. Trace pericholecystic/perihepatic fluid. No sonographic Alaniz sign.    BILE DUCTS: Not well visualized. The common duct measures 5 mm.    LIVER: Hepatic steatosis Limited assessment. The portal vein is patent with flow in  the normal direction.    RIGHT KIDNEY: No hydronephrosis.    PANCREAS: The pancreas is largely obscured by overlying gas.    Trace ascites.      Impression    IMPRESSION:  1.  Distended gallbladder with gallbladder sludge and mild wall thickening. Trace ascites. Sonographic findings are nonspecific. Consider MRCP or biliary scintigraphy as warranted clinically.       US Abdomen Limited    Narrative    EXAM: US ABDOMEN LIMITED  LOCATION: [facility]  DATE: 5/28/2025    INDICATION: cirrhosis  COMPARISON: 5/22/2025  TECHNIQUE: Limited abdominal ultrasound.    FINDINGS:    GALLBLADDER: Contracted gallbladder which limits evaluation. No definite shadowing echogenic calculi. No gallbladder wall thickening or pericholecystic fluid. Sonographic Alaniz sign is negative.    BILE DUCTS: No biliary dilatation. The common duct measures 4 mm.    LIVER: Hepatic steatosis which limits visualization of the parenchyma. No definite focal mass. The portal vein is patent with flow in the normal direction.    RIGHT KIDNEY: No hydronephrosis.    PANCREAS: The visualized portions are normal.    Trace perihepatic ascites.      Impression    IMPRESSION:  1.  Hepatic steatosis which limits visualization of the parenchyma. No definite focal liver mass.  2.  Trace perihepatic ascites.           Discharge Orders      Adult Urology  Referral      General info for SNF    Length of Stay Estimate: Short Term Care: Estimated # of Days <30  Condition at Discharge: Improving  Level of care:skilled   Rehabilitation Potential: Fair  Admission H&P remains valid and up-to-date: Yes  Recent Chemotherapy: N/A  Use Nursing Home Standing Orders: Yes     Mantoux instructions    Give two-step Mantoux (PPD) Per Facility Policy Yes     Follow Up and recommended labs and tests    Follow up with alf physician.  The following labs/tests are recommended: cbc ,  bmp .and LFTS     Reason for your hospital stay    Alcoholic  Hepatitis     Activity - Up with nursing assistance     Physical Therapy Adult Consult    Evaluate and treat as clinically indicated.    Reason:  Weakness     Occupational Therapy Adult Consult    Evaluate and treat as clinically indicated.    Reason:  Weakness     Fall precautions     Diet    Follow this diet upon discharge: Current Diet:Orders Placed This Encounter      Fluid restriction 1800 ML FLUID      Snacks/Supplements Adult: Ensure Enlive; With Meals      Combination Diet Low Saturated Fat Na <2400mg Diet,     Discharge Medications   Current Discharge Medication List        START taking these medications    Details   folic acid (FOLVITE) 1 MG tablet Take 1 tablet (1 mg) by mouth daily.    Associated Diagnoses: Alcoholic hepatitis, unspecified whether ascites present (H)      furosemide (LASIX) 20 MG tablet Take 1 tablet (20 mg) by mouth daily.    Associated Diagnoses: Alcoholic hepatitis, unspecified whether ascites present (H); Alcoholic cirrhosis, unspecified whether ascites present (H)      gabapentin (NEURONTIN) 100 MG capsule Take 1 capsule (100 mg) by mouth 3 times daily for 20 days.    Associated Diagnoses: Alcohol abuse      lactulose (CHRONULAC) 10 GM/15ML solution Take 30 mLs (20 g) by mouth daily. Hold after 2 bowel movements    Associated Diagnoses: Alcoholic hepatitis, unspecified whether ascites present (H); Alcohol abuse; Alcoholic cirrhosis, unspecified whether ascites present (H)      melatonin 5 MG tablet Take 1 tablet (5 mg) by mouth every evening as needed for sleep.    Associated Diagnoses: Other insomnia      oxyCODONE (ROXICODONE) 5 MG tablet Take 0.5 tablets (2.5 mg) by mouth every 8 hours as needed for severe pain.  Qty: 9 tablet, Refills: 0    Associated Diagnoses: Lumbar back pain with radiculopathy affecting lower extremity      prednisoLONE (ORAPRED) 15 MG/5 ML solution Take 10 mLs (30 mg) by mouth daily for 7 days, THEN 6.67 mLs (20 mg) daily for 7 days, THEN 3.33 mLs (10 mg)  daily for 7 days.    Associated Diagnoses: Alcoholic hepatitis, unspecified whether ascites present (H)      spironolactone (ALDACTONE) 25 MG tablet Take 1 tablet (25 mg) by mouth daily.    Associated Diagnoses: Alcoholic hepatitis, unspecified whether ascites present (H); Alcoholic cirrhosis, unspecified whether ascites present (H)      thiamine (B-1) 100 MG tablet Take 1 tablet (100 mg) by mouth daily.    Associated Diagnoses: Alcoholic hepatitis, unspecified whether ascites present (H)           CONTINUE these medications which have NOT CHANGED    Details   carvedilol (COREG) 25 MG tablet Take 1 tablet (25 mg) by mouth 2 times daily (with meals).  Qty: 180 tablet, Refills: 3    Associated Diagnoses: Essential hypertension with goal blood pressure less than 140/90      multivitamin, therapeutic (THERA-VIT) TABS tablet Take 1 tablet by mouth daily.      omeprazole (PRILOSEC) 40 MG DR capsule TAKE 1 CAPSULE DAILY  Qty: 90 capsule, Refills: 0    Associated Diagnoses: Gastroesophageal reflux disease with esophagitis without hemorrhage           STOP taking these medications       atorvastatin (LIPITOR) 20 MG tablet Comments:   Reason for Stopping:         hydrochlorothiazide (HYDRODIURIL) 25 MG tablet Comments:   Reason for Stopping:         HYDROcodone-acetaminophen (NORCO) 5-325 MG tablet Comments:   Reason for Stopping:         ibuprofen (ADVIL/MOTRIN) 800 MG tablet Comments:   Reason for Stopping:             Allergies   Allergies   Allergen Reactions     Shellfish Allergy Nausea and Vomiting and Rash

## 2025-05-30 NOTE — PROGRESS NOTES
[facility]    Medicine Progress Note - Hospitalist Service    Date of Admission:  5/22/2025    Assessment & Plan   Alcoholic Hepatitis  Alcoholic Cirrhosis, new diagnosis with Anasarca  Alcoholic Ketoacidosis  Hypoalbuminemia  Obesity  Deconditioning  Hepatic Encephalopathy  MELD 19, Madrey score 32,   CT showed Hepatic steatosis, hepatomegaly, early cirrhosis, and small volume ascites   --GI consult: Input appreciated  --LFTs trending low.  -- Continue to hold statin in setting of liver injury.  -- Continue prednisolone 40mg daily, GI managing.  -- Continue Aldactone 25 mg and Lasix 10mg   --PT eval: Home with home PT  --Fluid restriction  -- lactulose daily   -Repeat Ultrasound shows minimal ascites   -Lfts improving , Bilirubin improving   -Discussed with gastroenterology and to continue Prednisonolone taper on discharge      Norovirus infection Resolved  -- Continue supportive care  - Norovirus infection resolved patient continues to have diarrhea due to lactulose     Distended gallbladder with gallbladder sludge and mild wall thickening   Hemorroidal rectal bleeding  No RUQ tenderness, ALT wnl at 62 while  likely from ETOH use  In regards to Vomiting/diarrhea, could be enteritis due to noro virus  - Ultrasound 5/22/25  Borderline wall thickness. Trace pericholecystic/perihepatic fluid. No sonographic Alaniz sign.  -Continue to monitor        LARISA Resolved  Likely 2/2 renal congestion  -Resolved  -Continue Lasix and aldactone       Alcohol Use w/ active Wtihdrawal  GERD  Last drink 1 day PTA. No hx prior withdrawal  --thiamine/folate/gabapentin/clonidine ordered in conjugction with CIWA protocol  --CD ordered per patient preference. He initially declined services; now agrees to rehab so they have been re-consulted.   --resumed PTA PPI  -Not scoring currently on CIWA   -Clonidine tapered down to 0.1 mg twice daily     Hypomagnesemia  Hypokalemia  --telemetry  --RN based repletion  "ordered  --Hold PTA hydrochlorothiazide     Anemia  Thrombocytopenia  Likely 2/2 liver disease  --trend Hb/plts  -Platelet count stable at 75   -Hemoglobin stable at 11.3      HTN  HLD  --hold statin given hepatitis  --resumed coreg but not hydrochlorothiazide given hypokalemia     5 cm complex left renal cyst  --outpatient follow-up w/ renal mass CT  -Urology referral     Chronic back pain   Lumbar spolndylosis W/ radiculopathy  S/p B/l L5-S1 TFESI 1/2025  --max tylenol 2g  --Low dose oxy only given cirrhosis             Diet: Combination Diet Low Saturated Fat Na <2400mg Diet, No Caffeine Diet  Fluid restriction 1800 ML FLUID  Snacks/Supplements Adult: Ensure Enlive; With Meals  Diet    DVT Prophylaxis: Pneumatic Compression Devices  Infante Catheter: Not present  Lines: None     Cardiac Monitoring: ACTIVE order. Indication: Electrolyte Imbalance (24 hours)- Magnesium <1.3 mg/ml; Potassium < =2.8 or > 5.5 mg/ml  Code Status: Full Code      Clinically Significant Risk Factors            # Hypercalcemia: corrected calcium is >10.1, will monitor as appropriate  # Hypomagnesemia: Lowest Mg = 1.6 mg/dL in last 2 days, will replace as needed   # Hypoalbuminemia: Lowest albumin = 2.6 g/dL at 5/23/2025  6:55 AM, will monitor as appropriate  # Coagulation Defect: INR = 1.41 (Ref range: 0.85 - 1.15) and/or PTT = N/A, will monitor for bleeding  # Thrombocytopenia: Lowest platelets = 68 in last 2 days, will monitor for bleeding   # Hypertension: Noted on problem list            # Morbid Obesity: Estimated body mass index is 46.85 kg/m  as calculated from the following:    Height as of this encounter: 1.778 m (5' 10\").    Weight as of this encounter: 148.1 kg (326 lb 8 oz).             Social Drivers of Health    Tobacco Use: Medium Risk (8/13/2024)    Patient History     Smoking Tobacco Use: Former     Smokeless Tobacco Use: Never   Physical Activity: Inactive (8/13/2024)    Exercise Vital Sign     Days of Exercise per Week: 0 " days     Minutes of Exercise per Session: 0 min   Stress: Stress Concern Present (8/13/2024)    [employer] [place] [employer] - Occupational Stress Questionnaire     Feeling of Stress : Rather much   Social Connections: Unknown (8/13/2024)    Social Connection and Isolation Panel [NHANES]     Frequency of Social Gatherings with Friends and Family: Three times a week          Disposition Plan     Medically Ready for Discharge: Ready Now             Marilyn Jules MD  Hospitalist Service  [facility]  Securely message with [employer] (more info)  Text page via [employer] Paging/Directory   ______________________________________________________________________    Interval History     Patient seen and examined at bedside.  No acute overnight events.  Patient's upset about one of his friends who was very sick and likely terminal    Offered spiritual consult which patient declined.    Physical Exam   Vital Signs: Temp: 98.1  F (36.7  C) Temp src: Oral BP: 128/72 Pulse: 85   Resp: 18 SpO2: 93 % O2 Device: None (Room air)    Weight: 326 lbs 8.02 oz    Physical Exam  Cardiovascular:      Rate and Rhythm: Normal rate and regular rhythm.      Heart sounds: Normal heart sounds.   Pulmonary:      Effort: Pulmonary effort is normal. No respiratory distress.      Breath sounds: Normal breath sounds.   Abdominal:      General: There is no distension.      Palpations: Abdomen is soft.      Tenderness: There is no abdominal tenderness.          Medical Decision Making       30 MINUTES SPENT BY ME on the date of service doing chart review, history, exam, documentation & further activities per the note.      Data     I have personally reviewed the following data over the past 24 hrs:    5.8  \   11.2 (L)   / 77 (L)     137 100 15.1 /  117 (H)   4.7 29 0.84 \       Imaging results reviewed over the past 24 hrs:   No results found for this or any previous visit (from the past 24 hours).

## 2025-05-30 NOTE — PLAN OF CARE
Summary: Vomiting, Diarrhea, Weakness, ETOH     DATE & TIME: 5/29/25, 1930 - 1819  Cognitive Concerns/ Orientation : A&Ox4, calm, cooperative and very pleasant.  BEHAVIOR & AGGRESSION TOOL COLOR: Green  CIWA SCORE: NA  ABNL VS/O2: VSS on room air  MOBILITY: Independent  PAIN MANAGMENT: Oxycodone given x 3 for chronic low back pain  DIET: Cardiac, Fluid restriction 1800 ml   BOWEL/BLADDER: Continent of bowel and bladder  ABNL LAB/BG: AM labs pending  DRAIN/DEVICES: PIV SL  TELEMETRY RHYTHM: NSR  SKIN: Large bruise on right hip/flank, galindo BLE, scab to L knee  TESTS/PROCEDURES: NA  D/C DATE/GOALS/PLACE: Discharge to [facility] likely today pending insurance authorization.  OTHER IMPORTANT INFO: GI signed off    Goal Outcome Evaluation:      Plan of Care Reviewed With: patient    Overall Patient Progress: improvingOverall Patient Progress: improving

## 2025-05-31 ENCOUNTER — APPOINTMENT (OUTPATIENT)
Dept: PHYSICAL THERAPY | Facility: CLINIC | Age: 55
End: 2025-05-31
Payer: COMMERCIAL

## 2025-05-31 LAB
ANION GAP SERPL CALCULATED.3IONS-SCNC: 7 MMOL/L (ref 7–15)
BUN SERPL-MCNC: 14.3 MG/DL (ref 6–20)
CALCIUM SERPL-MCNC: 9.5 MG/DL (ref 8.8–10.4)
CHLORIDE SERPL-SCNC: 100 MMOL/L (ref 98–107)
CREAT SERPL-MCNC: 0.82 MG/DL (ref 0.67–1.17)
EGFRCR SERPLBLD CKD-EPI 2021: >90 ML/MIN/1.73M2
ERYTHROCYTE [DISTWIDTH] IN BLOOD BY AUTOMATED COUNT: 18.9 % (ref 10–15)
GLUCOSE SERPL-MCNC: 115 MG/DL (ref 70–99)
HCO3 SERPL-SCNC: 28 MMOL/L (ref 22–29)
HCT VFR BLD AUTO: 31.9 % (ref 40–53)
HGB BLD-MCNC: 10.3 G/DL (ref 13.3–17.7)
MAGNESIUM SERPL-MCNC: 1.6 MG/DL (ref 1.7–2.3)
MCH RBC QN AUTO: 33 PG (ref 26.5–33)
MCHC RBC AUTO-ENTMCNC: 32.3 G/DL (ref 31.5–36.5)
MCV RBC AUTO: 102 FL (ref 78–100)
PLATELET # BLD AUTO: 73 10E3/UL (ref 150–450)
POTASSIUM SERPL-SCNC: 4.3 MMOL/L (ref 3.4–5.3)
RBC # BLD AUTO: 3.12 10E6/UL (ref 4.4–5.9)
SODIUM SERPL-SCNC: 135 MMOL/L (ref 135–145)
WBC # BLD AUTO: 5.7 10E3/UL (ref 4–11)

## 2025-05-31 PROCEDURE — 36415 COLL VENOUS BLD VENIPUNCTURE: CPT | Performed by: STUDENT IN AN ORGANIZED HEALTH CARE EDUCATION/TRAINING PROGRAM

## 2025-05-31 PROCEDURE — 250N000013 HC RX MED GY IP 250 OP 250 PS 637: Performed by: STUDENT IN AN ORGANIZED HEALTH CARE EDUCATION/TRAINING PROGRAM

## 2025-05-31 PROCEDURE — 120N000001 HC R&B MED SURG/OB

## 2025-05-31 PROCEDURE — 83735 ASSAY OF MAGNESIUM: CPT | Performed by: INTERNAL MEDICINE

## 2025-05-31 PROCEDURE — 85027 COMPLETE CBC AUTOMATED: CPT | Performed by: STUDENT IN AN ORGANIZED HEALTH CARE EDUCATION/TRAINING PROGRAM

## 2025-05-31 PROCEDURE — 80048 BASIC METABOLIC PNL TOTAL CA: CPT | Performed by: STUDENT IN AN ORGANIZED HEALTH CARE EDUCATION/TRAINING PROGRAM

## 2025-05-31 PROCEDURE — 250N000012 HC RX MED GY IP 250 OP 636 PS 637: Performed by: STUDENT IN AN ORGANIZED HEALTH CARE EDUCATION/TRAINING PROGRAM

## 2025-05-31 PROCEDURE — 99232 SBSQ HOSP IP/OBS MODERATE 35: CPT | Performed by: STUDENT IN AN ORGANIZED HEALTH CARE EDUCATION/TRAINING PROGRAM

## 2025-05-31 PROCEDURE — 97116 GAIT TRAINING THERAPY: CPT | Mod: GP | Performed by: PHYSICAL THERAPY ASSISTANT

## 2025-05-31 RX ORDER — LIDOCAINE 4 G/G
2 PATCH TOPICAL
Status: DISCONTINUED | OUTPATIENT
Start: 2025-05-31 | End: 2025-06-02 | Stop reason: HOSPADM

## 2025-05-31 RX ORDER — ACETAMINOPHEN 325 MG/1
325 TABLET ORAL EVERY 8 HOURS
Status: DISCONTINUED | OUTPATIENT
Start: 2025-05-31 | End: 2025-06-02 | Stop reason: HOSPADM

## 2025-05-31 RX ORDER — MAGNESIUM OXIDE 400 MG/1
400 TABLET ORAL EVERY 4 HOURS
Status: COMPLETED | OUTPATIENT
Start: 2025-05-31 | End: 2025-05-31

## 2025-05-31 RX ADMIN — SPIRONOLACTONE 25 MG: 25 TABLET ORAL at 08:58

## 2025-05-31 RX ADMIN — PANTOPRAZOLE SODIUM 40 MG: 40 TABLET, DELAYED RELEASE ORAL at 08:58

## 2025-05-31 RX ADMIN — OXYCODONE HYDROCHLORIDE 2.5 MG: 5 TABLET ORAL at 03:20

## 2025-05-31 RX ADMIN — CARVEDILOL 25 MG: 25 TABLET, FILM COATED ORAL at 17:58

## 2025-05-31 RX ADMIN — OXYCODONE HYDROCHLORIDE 2.5 MG: 5 TABLET ORAL at 06:37

## 2025-05-31 RX ADMIN — GABAPENTIN 100 MG: 100 CAPSULE ORAL at 17:58

## 2025-05-31 RX ADMIN — CLONIDINE HYDROCHLORIDE 0.1 MG: 0.1 TABLET ORAL at 08:58

## 2025-05-31 RX ADMIN — PANTOPRAZOLE SODIUM 40 MG: 40 TABLET, DELAYED RELEASE ORAL at 20:14

## 2025-05-31 RX ADMIN — LACTULOSE 20 G: 20 SOLUTION ORAL at 08:59

## 2025-05-31 RX ADMIN — CALCIUM CARBONATE (ANTACID) CHEW TAB 500 MG 1000 MG: 500 CHEW TAB at 09:06

## 2025-05-31 RX ADMIN — MULTIVITAMIN TABLET 1 TABLET: TABLET at 08:58

## 2025-05-31 RX ADMIN — FUROSEMIDE 20 MG: 20 TABLET ORAL at 08:58

## 2025-05-31 RX ADMIN — PREDNISOLONE SODIUM PHOSPHATE 40 MG: 15 SOLUTION ORAL at 08:58

## 2025-05-31 RX ADMIN — CARVEDILOL 25 MG: 25 TABLET, FILM COATED ORAL at 08:58

## 2025-05-31 RX ADMIN — Medication 400 MG: at 06:57

## 2025-05-31 RX ADMIN — OXYCODONE HYDROCHLORIDE 2.5 MG: 5 TABLET ORAL at 00:04

## 2025-05-31 RX ADMIN — FOLIC ACID 1 MG: 1 TABLET ORAL at 08:58

## 2025-05-31 RX ADMIN — GABAPENTIN 100 MG: 100 CAPSULE ORAL at 00:04

## 2025-05-31 RX ADMIN — Medication 400 MG: at 13:29

## 2025-05-31 RX ADMIN — GABAPENTIN 100 MG: 100 CAPSULE ORAL at 08:58

## 2025-05-31 RX ADMIN — THIAMINE HCL TAB 100 MG 100 MG: 100 TAB at 08:58

## 2025-05-31 RX ADMIN — LIDOCAINE 2 PATCH: 4 PATCH TOPICAL at 13:29

## 2025-05-31 ASSESSMENT — ACTIVITIES OF DAILY LIVING (ADL)
ADLS_ACUITY_SCORE: 45

## 2025-05-31 NOTE — PLAN OF CARE
Goal Outcome Evaluation:                      5/30/25 - 5/31/25  [id number]  Cognitive Concerns/ Orientation : A&Ox4, calm, cooperative and very pleasant.  BEHAVIOR & AGGRESSION TOOL COLOR: Green  CIWA SCORE: N/A   ABNL VS/O2: VSS on RA  MOBILITY: Independent  PAIN MANAGMENT: C/o of chronic low back pain, Oxycodone PRN   DIET: Cardiac, Fluid restriction 1800 ml   BOWEL/BLADDER: Continent of bowel and bladder  ABNL LAB/BG: K+ 4.3, Mg 1.6, replacement started, on high Mg protocol  DRAIN/DEVICES: No IV access  TELEMETRY RHYTHM: NSR  SKIN: Large bruise on right hip/flank, galindo BLE, scab to L knee  TESTS/PROCEDURES: NA  D/C DATE/GOALS/PLACE: Discharge to [facility] on Monday.   OTHER IMPORTANT INFO:

## 2025-05-31 NOTE — PLAN OF CARE
Goal Outcome Evaluation:      Plan of Care Reviewed With: patient          Outcome Evaluation: No longer needs TCU, patient requesting direct transfer to [facility].

## 2025-05-31 NOTE — PLAN OF CARE
Goal Outcome Evaluation:  Summary: Vomiting, Diarrhea, Weakness, ETOH   DATE & TIME: 05/31/25 0119-1295  Cognitive Concerns/ Orientation : A&Ox4  BEHAVIOR & AGGRESSION TOOL COLOR: Green  CIWA SCORE: NA   ABNL VS/O2: VSS on RA ex HTN  MOBILITY: Independent  PAIN MANAGMENT: C/o of chronic low back pain - encouraged to use tylenol (refused) and lidocaine patch. Informed patient he cannot manage with oxycodone per SW note  DIET: Cardiac, FR 1800 ml   BOWEL/BLADDER: Continent. BM this shift.  ABNL LAB/BG: K 4.3, Mg 1.6 (replaced)  DRAIN/DEVICES: No IV access - MD aware  TELEMETRY RHYTHM: NSR  SKIN: Large bruise on right hip/flank, galindo BLE, scab to L knee  TESTS/PROCEDURES: NA  D/C DATE/GOALS/PLACE: Discharge to [facility] on Monday.   OTHER IMPORTANT INFO: C/o of heartburn - gave PRN tums x1

## 2025-05-31 NOTE — PROGRESS NOTES
[facility]    Medicine Progress Note - Hospitalist Service    Date of Admission:  5/22/2025    Assessment & Plan   Alcoholic Hepatitis  Alcoholic Cirrhosis, new diagnosis with Anasarca  Alcoholic Ketoacidosis  Hypoalbuminemia  Obesity  Deconditioning  Hepatic Encephalopathy  MELD 19, Madrey score 32,   CT showed Hepatic steatosis, hepatomegaly, early cirrhosis, and small volume ascites   --GI consult: Input appreciated  --LFTs trending low.  -- Continue to hold statin in setting of liver injury.  -- Continue prednisolone 40mg daily, GI managing.  -- Continue Aldactone 25 mg and Lasix 10mg   --PT eval: Home with home PT  --Fluid restriction  -- lactulose daily   -Repeat Ultrasound shows minimal ascites   -Lfts improving , Bilirubin improving   -Discussed with gastroenterology and to continue Prednisonolone taper on discharge      Norovirus infection Resolved  -- Continue supportive care  - Norovirus infection resolved patient continues to have diarrhea due to lactulose     Distended gallbladder with gallbladder sludge and mild wall thickening   Hemorroidal rectal bleeding  No RUQ tenderness, ALT wnl at 62 while  likely from ETOH use  In regards to Vomiting/diarrhea, could be enteritis due to noro virus  - Ultrasound 5/22/25  Borderline wall thickness. Trace pericholecystic/perihepatic fluid. No sonographic Alaniz sign.  -Continue to monitor        LARISA Resolved  Likely 2/2 renal congestion  -Resolved  -Continue Lasix and aldactone       Alcohol Use w/ active Wtihdrawal  GERD  Last drink 1 day PTA. No hx prior withdrawal  --thiamine/folate/gabapentin/clonidine ordered in conjugction with CIWA protocol  --CD ordered per patient preference. He initially declined services; now agrees to rehab so they have been re-consulted.   --resumed PTA PPI  -Not scoring currently on CIWA   -Clonidine tapered down to 0.1 mg twice daily     Hypomagnesemia  Hypokalemia  --telemetry  --RN based repletion  "ordered  --Hold PTA hydrochlorothiazide     Anemia  Thrombocytopenia  Likely 2/2 liver disease  --trend Hb/plts  -Platelet count stable at 75   -Hemoglobin stable at 11.3      HTN  HLD  --hold statin given hepatitis  --resumed coreg but not hydrochlorothiazide given hypokalemia     5 cm complex left renal cyst  --outpatient follow-up w/ renal mass CT  -Urology referral     Chronic back pain   Lumbar spolndylosis W/ radiculopathy  S/p B/l L5-S1 TFESI 1/2025  --max tylenol 2g  --Since Oxycodone not allowed at Detox program   Patient can use tyelonol upto 2g and also lidocaine patches              Diet: Combination Diet Low Saturated Fat Na <2400mg Diet, No Caffeine Diet  Fluid restriction 1800 ML FLUID  Snacks/Supplements Adult: Ensure Enlive; With Meals  Diet    DVT Prophylaxis: Pneumatic Compression Devices  Infante Catheter: Not present  Lines: None     Cardiac Monitoring: None  Code Status: Full Code      Clinically Significant Risk Factors             # Hypomagnesemia: Lowest Mg = 1.6 mg/dL in last 2 days, will replace as needed   # Hypoalbuminemia: Lowest albumin = 2.6 g/dL at 5/23/2025  6:55 AM, will monitor as appropriate  # Coagulation Defect: INR = 1.41 (Ref range: 0.85 - 1.15) and/or PTT = N/A, will monitor for bleeding  # Thrombocytopenia: Lowest platelets = 73 in last 2 days, will monitor for bleeding   # Hypertension: Noted on problem list            # Morbid Obesity: Estimated body mass index is 46.85 kg/m  as calculated from the following:    Height as of this encounter: 1.778 m (5' 10\").    Weight as of this encounter: 148.1 kg (326 lb 8 oz).             Social Drivers of Health    Tobacco Use: Medium Risk (8/13/2024)    Patient History     Smoking Tobacco Use: Former     Smokeless Tobacco Use: Never   Physical Activity: Inactive (8/13/2024)    Exercise Vital Sign     Days of Exercise per Week: 0 days     Minutes of Exercise per Session: 0 min   Stress: Stress Concern Present (8/13/2024)    [employer] [place] [employer] - Occupational Stress Questionnaire     Feeling of Stress : Rather much   Social Connections: Unknown (8/13/2024)    Social Connection and Isolation Panel [NHANES]     Frequency of Social Gatherings with Friends and Family: Three times a week          Disposition Plan     Medically Ready for Discharge: Ready Now  Awaiting placement to detox on Monday           Marilyn Jules MD  Hospitalist Service  [facility]  Securely message with [employer] (more info)  Text page via [employer] Paging/Directory   ______________________________________________________________________    Interval History   Patient complained of headache  Discussed with him that the detox center would not allow any prescriptions for oxycodone    Patient okay to use Tylenol I discussed with the patient that he can still use Tylenol up to 2 g/day    Will also start patient on lidocaine patches.      Physical Exam   Vital Signs: Temp: 98.7  F (37.1  C) Temp src: Oral BP: (!) 150/93 Pulse: 91   Resp: 20 SpO2: 97 % O2 Device: None (Room air)    Weight: 326 lbs 8.02 oz    Physical Exam  Cardiovascular:      Rate and Rhythm: Normal rate and regular rhythm.   Pulmonary:      Effort: Pulmonary effort is normal. No respiratory distress.      Breath sounds: Normal breath sounds.   Abdominal:      General: There is no distension.      Palpations: Abdomen is soft.      Tenderness: There is no abdominal tenderness.          Medical Decision Making       35 MINUTES SPENT BY ME on the date of service doing chart review, history, exam, documentation & further activities per the note.      Data     I have personally reviewed the following data over the past 24 hrs:    5.7  \   10.3 (L)   / 73 (L)     135 100 14.3 /  115 (H)   4.3 28 0.82 \       Imaging results reviewed over the past 24 hrs:   No results found for this or any previous visit (from the past 24 hours).

## 2025-06-01 LAB
ANION GAP SERPL CALCULATED.3IONS-SCNC: 10 MMOL/L (ref 7–15)
BUN SERPL-MCNC: 15.6 MG/DL (ref 6–20)
CALCIUM SERPL-MCNC: 9.4 MG/DL (ref 8.8–10.4)
CHLORIDE SERPL-SCNC: 100 MMOL/L (ref 98–107)
CREAT SERPL-MCNC: 0.76 MG/DL (ref 0.67–1.17)
EGFRCR SERPLBLD CKD-EPI 2021: >90 ML/MIN/1.73M2
ERYTHROCYTE [DISTWIDTH] IN BLOOD BY AUTOMATED COUNT: 18.7 % (ref 10–15)
GLUCOSE SERPL-MCNC: 130 MG/DL (ref 70–99)
HCO3 SERPL-SCNC: 27 MMOL/L (ref 22–29)
HCT VFR BLD AUTO: 33.3 % (ref 40–53)
HGB BLD-MCNC: 10.6 G/DL (ref 13.3–17.7)
MAGNESIUM SERPL-MCNC: 1.7 MG/DL (ref 1.7–2.3)
MCH RBC QN AUTO: 33.2 PG (ref 26.5–33)
MCHC RBC AUTO-ENTMCNC: 31.8 G/DL (ref 31.5–36.5)
MCV RBC AUTO: 104 FL (ref 78–100)
PLATELET # BLD AUTO: 79 10E3/UL (ref 150–450)
POTASSIUM SERPL-SCNC: 4.1 MMOL/L (ref 3.4–5.3)
RBC # BLD AUTO: 3.19 10E6/UL (ref 4.4–5.9)
SODIUM SERPL-SCNC: 137 MMOL/L (ref 135–145)
WBC # BLD AUTO: 6 10E3/UL (ref 4–11)

## 2025-06-01 PROCEDURE — 120N000001 HC R&B MED SURG/OB

## 2025-06-01 PROCEDURE — 82435 ASSAY OF BLOOD CHLORIDE: CPT | Performed by: STUDENT IN AN ORGANIZED HEALTH CARE EDUCATION/TRAINING PROGRAM

## 2025-06-01 PROCEDURE — 99232 SBSQ HOSP IP/OBS MODERATE 35: CPT | Performed by: STUDENT IN AN ORGANIZED HEALTH CARE EDUCATION/TRAINING PROGRAM

## 2025-06-01 PROCEDURE — 250N000013 HC RX MED GY IP 250 OP 250 PS 637: Performed by: STUDENT IN AN ORGANIZED HEALTH CARE EDUCATION/TRAINING PROGRAM

## 2025-06-01 PROCEDURE — 85048 AUTOMATED LEUKOCYTE COUNT: CPT | Performed by: STUDENT IN AN ORGANIZED HEALTH CARE EDUCATION/TRAINING PROGRAM

## 2025-06-01 PROCEDURE — 250N000012 HC RX MED GY IP 250 OP 636 PS 637: Performed by: STUDENT IN AN ORGANIZED HEALTH CARE EDUCATION/TRAINING PROGRAM

## 2025-06-01 PROCEDURE — 83735 ASSAY OF MAGNESIUM: CPT | Performed by: INTERNAL MEDICINE

## 2025-06-01 PROCEDURE — 36415 COLL VENOUS BLD VENIPUNCTURE: CPT | Performed by: STUDENT IN AN ORGANIZED HEALTH CARE EDUCATION/TRAINING PROGRAM

## 2025-06-01 PROCEDURE — 85041 AUTOMATED RBC COUNT: CPT | Performed by: STUDENT IN AN ORGANIZED HEALTH CARE EDUCATION/TRAINING PROGRAM

## 2025-06-01 RX ORDER — MAGNESIUM OXIDE 400 MG/1
400 TABLET ORAL EVERY 4 HOURS
Status: COMPLETED | OUTPATIENT
Start: 2025-06-01 | End: 2025-06-01

## 2025-06-01 RX ORDER — LANOLIN ALCOHOL/MO/W.PET/CERES
100 CREAM (GRAM) TOPICAL DAILY
Qty: 30 TABLET | Refills: 0 | Status: SHIPPED | OUTPATIENT
Start: 2025-06-01 | End: 2025-07-01

## 2025-06-01 RX ORDER — SPIRONOLACTONE 25 MG/1
25 TABLET ORAL DAILY
Qty: 30 TABLET | Refills: 0 | Status: SHIPPED | OUTPATIENT
Start: 2025-06-01 | End: 2025-07-01

## 2025-06-01 RX ORDER — ACETAMINOPHEN 325 MG/1
325 TABLET ORAL EVERY 8 HOURS PRN
Qty: 30 TABLET | Refills: 0 | Status: SHIPPED | OUTPATIENT
Start: 2025-06-01 | End: 2025-06-06

## 2025-06-01 RX ORDER — FUROSEMIDE 20 MG/1
20 TABLET ORAL DAILY
Qty: 30 TABLET | Refills: 0 | Status: SHIPPED | OUTPATIENT
Start: 2025-06-01 | End: 2025-07-01

## 2025-06-01 RX ORDER — CARVEDILOL 25 MG/1
25 TABLET ORAL 2 TIMES DAILY WITH MEALS
Qty: 60 TABLET | Refills: 0 | Status: SHIPPED | OUTPATIENT
Start: 2025-06-01 | End: 2025-07-01

## 2025-06-01 RX ORDER — PREDNISOLONE SODIUM PHOSPHATE 15 MG/5ML
30 SOLUTION ORAL DAILY
Status: DISCONTINUED | OUTPATIENT
Start: 2025-06-02 | End: 2025-06-02 | Stop reason: HOSPADM

## 2025-06-01 RX ORDER — FOLIC ACID 1 MG/1
1 TABLET ORAL DAILY
Qty: 30 TABLET | Refills: 0 | Status: SHIPPED | OUTPATIENT
Start: 2025-06-01 | End: 2025-07-01

## 2025-06-01 RX ORDER — OMEPRAZOLE 40 MG/1
40 CAPSULE, DELAYED RELEASE ORAL DAILY
Qty: 30 CAPSULE | Refills: 0 | Status: SHIPPED | OUTPATIENT
Start: 2025-06-01 | End: 2025-06-01

## 2025-06-01 RX ORDER — OMEPRAZOLE 40 MG/1
40 CAPSULE, DELAYED RELEASE ORAL DAILY
Qty: 30 CAPSULE | Refills: 0 | Status: SHIPPED | OUTPATIENT
Start: 2025-06-01

## 2025-06-01 RX ORDER — GABAPENTIN 100 MG/1
100 CAPSULE ORAL 3 TIMES DAILY
Qty: 90 CAPSULE | Refills: 0 | Status: SHIPPED | OUTPATIENT
Start: 2025-06-01 | End: 2025-07-01

## 2025-06-01 RX ORDER — LIDOCAINE 4 G/G
2 PATCH TOPICAL EVERY 24 HOURS
Qty: 60 PATCH | Refills: 0 | Status: SHIPPED | OUTPATIENT
Start: 2025-06-01 | End: 2025-07-01

## 2025-06-01 RX ADMIN — CARVEDILOL 25 MG: 25 TABLET, FILM COATED ORAL at 08:54

## 2025-06-01 RX ADMIN — LACTULOSE 20 G: 20 SOLUTION ORAL at 08:54

## 2025-06-01 RX ADMIN — LIDOCAINE 2 PATCH: 4 PATCH TOPICAL at 08:54

## 2025-06-01 RX ADMIN — PREDNISOLONE SODIUM PHOSPHATE 40 MG: 15 SOLUTION ORAL at 08:57

## 2025-06-01 RX ADMIN — CLONIDINE HYDROCHLORIDE 0.1 MG: 0.1 TABLET ORAL at 08:54

## 2025-06-01 RX ADMIN — PANTOPRAZOLE SODIUM 40 MG: 40 TABLET, DELAYED RELEASE ORAL at 21:25

## 2025-06-01 RX ADMIN — PANTOPRAZOLE SODIUM 40 MG: 40 TABLET, DELAYED RELEASE ORAL at 08:54

## 2025-06-01 RX ADMIN — GABAPENTIN 100 MG: 100 CAPSULE ORAL at 01:08

## 2025-06-01 RX ADMIN — FUROSEMIDE 20 MG: 20 TABLET ORAL at 08:54

## 2025-06-01 RX ADMIN — THIAMINE HCL TAB 100 MG 100 MG: 100 TAB at 08:54

## 2025-06-01 RX ADMIN — CARVEDILOL 25 MG: 25 TABLET, FILM COATED ORAL at 17:16

## 2025-06-01 RX ADMIN — Medication 400 MG: at 08:54

## 2025-06-01 RX ADMIN — MULTIVITAMIN TABLET 1 TABLET: TABLET at 08:55

## 2025-06-01 RX ADMIN — GABAPENTIN 100 MG: 100 CAPSULE ORAL at 17:16

## 2025-06-01 RX ADMIN — Medication 400 MG: at 11:44

## 2025-06-01 RX ADMIN — ACETAMINOPHEN 325 MG: 325 TABLET, FILM COATED ORAL at 11:44

## 2025-06-01 RX ADMIN — CALCIUM CARBONATE (ANTACID) CHEW TAB 500 MG 1000 MG: 500 CHEW TAB at 08:54

## 2025-06-01 RX ADMIN — GABAPENTIN 100 MG: 100 CAPSULE ORAL at 08:55

## 2025-06-01 RX ADMIN — FOLIC ACID 1 MG: 1 TABLET ORAL at 08:55

## 2025-06-01 RX ADMIN — SPIRONOLACTONE 25 MG: 25 TABLET ORAL at 08:54

## 2025-06-01 ASSESSMENT — ACTIVITIES OF DAILY LIVING (ADL)
ADLS_ACUITY_SCORE: 45

## 2025-06-01 NOTE — PROGRESS NOTES
Care Management Follow Up    Length of Stay (days): 9    Expected Discharge Date: 06/02/2025     Concerns to be Addressed: discharge planning     Patient plan of care discussed at interdisciplinary rounds: Yes    Anticipated Discharge Disposition: Inpatient Chemical Dependency ([facility] at their [place] location.)     Anticipated Discharge Services: None  Anticipated Discharge DME:  (possibly a walker, up to PT)    Patient/family educated on Medicare website which has current facility and service quality ratings:    Education Provided on the Discharge Plan: Yes  Patient/Family in Agreement with the Plan: yes    Referrals Placed by CM/SW: Post Acute Facilities  Private pay costs discussed: Not applicable    Discussed  Partnership in Safe Discharge Planning  document with patient/family: No     Handoff Completed: No, handoff not indicated or clinically appropriate    Additional Information:  Writer sent a message to Dr Jules requesting that patients oxy be discontinued as the treatment center will not accept patient on a narcotic. Dr Jules confirmed that Oxy discontinued yesterday (Saturday)     Next Steps: Follow for safe discharge planning     DILIP Contreras

## 2025-06-01 NOTE — PROGRESS NOTES
[facility]    Medicine Progress Note - Hospitalist Service    Date of Admission:  5/22/2025    Assessment & Plan     Patient is a 54-year-old male who presented to the ER with the past medical history hypertension, alcohol abuse with progressive whole body weakness and decreased appetite for 2 weeks.  Found to have alcohol hepatitis with likely alcoholic cirrhosis      Alcoholic Hepatitis  Alcoholic Cirrhosis, new diagnosis with Anasarca  Alcoholic Ketoacidosis  Hypoalbuminemia  Obesity  Deconditioning  Hepatic Encephalopathy  MELD 19, Madrey score 32,   CT showed Hepatic steatosis, hepatomegaly, early cirrhosis, and small volume ascites   --GI consult: Input appreciated  --LFTs trending down.  -- Continue to hold statin in setting of liver injury.  -- Continue prednisolone 40mg daily, -- Continue Aldactone 25 mg and Lasix 10mg   ---Fluid restriction  -- lactulose daily   -Repeat Ultrasound shows minimal ascites   -Lfts improving , Bilirubin improving   -Discussed with gastroenterology and to continue Prednisonolone taper on discharge   - Patient does not want to take lactulose because of multiple episodes diarrhea and we will switch him over to rifaximin  -Outpatient follow-up with gastroenterology with Dr. Phoenix EASTON.  This was discussed with the patient and his ex-wife  -Naltrexone, currently contraindicated due to his elevated LFTs and will discharge on gabapentin 100 mg every 8 hours to decrease cravings.     Norovirus infection Resolved  -- Continue supportive care  - Norovirus infection resolved patient continues to have diarrhea due to lactulose     Distended gallbladder with gallbladder sludge and mild wall thickening   Hemorroidal rectal bleeding  No RUQ tenderness, ALT wnl at 62 while  likely from ETOH use  In regards to Vomiting/diarrhea, could be enteritis due to noro virus  - Ultrasound 5/22/25  Borderline wall thickness. Trace pericholecystic/perihepatic fluid. No  sonographic Alaniz sign.  -Continue to monitor        LARISA Resolved  Likely 2/2 renal congestion  -Resolved  -Continue Lasix and aldactone      # Anxiety   - Will consult psychiatry       Alcohol Use w/ active Wtihdrawal  GERD  Last drink 1 day PTA. No hx prior withdrawal  --thiamine/folate/gabapentin/clonidine ordered in conjugction with [facility] protocol  --CD ordered per patient preference. He initially declined services; now agrees to rehab so they have been re-consulted.   --resumed PTA PPI  -Not scoring currently on CIWA   -Clonidine tapered down to 0.1 mg daily and will stop clonidine today     Hypomagnesemia  Hypokalemia  --telemetry  --RN based repletion ordered  --Hold PTA hydrochlorothiazide     Anemia  Thrombocytopenia  Likely 2/2 liver disease  --trend Hb/plts  -Platelet count stable at 75   -Hemoglobin stable at 11.3      HTN  HLD  --hold statin given hepatitis  --resumed coreg but not hydrochlorothiazide given hypokalemia     5 cm complex left renal cyst  --outpatient follow-up w/ renal mass CT  -Urology referral  -Discussed with the patient about complex renal cyst follow-up     Chronic back pain   Lumbar spolndylosis W/ radiculopathy  S/p B/l L5-S1 TFESI 1/2025  --max tylenol 2g  --Since Oxycodone not allowed at Detox program   Patient can use tyelonol upto 2g and also lidocaine patches              Diet: Combination Diet Low Saturated Fat Na <2400mg Diet, No Caffeine Diet  Fluid restriction 1800 ML FLUID  Snacks/Supplements Adult: Ensure Enlive; With Meals    DVT Prophylaxis: Pneumatic Compression Devices  Infante Catheter: Not present  Lines: None     Cardiac Monitoring: None  Code Status: Full Code      Clinically Significant Risk Factors             # Hypomagnesemia: Lowest Mg = 1.6 mg/dL in last 2 days, will replace as needed   # Hypoalbuminemia: Lowest albumin = 2.6 g/dL at 5/23/2025  6:55 AM, will monitor as appropriate   # Thrombocytopenia: Lowest platelets = 73 in last 2 days, will monitor for  "bleeding   # Hypertension: Noted on problem list            # Morbid Obesity: Estimated body mass index is 46.85 kg/m  as calculated from the following:    Height as of this encounter: 1.778 m (5' 10\").    Weight as of this encounter: 148.1 kg (326 lb 8 oz).             Social Drivers of Health    Tobacco Use: Medium Risk (8/13/2024)    Patient History     Smoking Tobacco Use: Former     Smokeless Tobacco Use: Never   Physical Activity: Inactive (8/13/2024)    Exercise Vital Sign     Days of Exercise per Week: 0 days     Minutes of Exercise per Session: 0 min   Stress: Stress Concern Present (8/13/2024)    [employer] [place] [employer]ational [employer] - Occupational Stress Questionnaire     Feeling of Stress : Rather much   Social Connections: Unknown (8/13/2024)    Social Connection and Isolation Panel [NHANES]     Frequency of Social Gatherings with Friends and Family: Three times a week          Disposition Plan     Medically Ready for Discharge: Anticipated Tomorrow             Marilyn Jules MD  Hospitalist Service  [facility]  Securely message with [employer] (more info)  Text page via [email or web address] Paging/Directory   ______________________________________________________________________    Interval History   No acute overnight events  Patient seen and examined at bedside.    Patient said that he is not feeling very well because of multiple episodes of diarrhea.    Discussed with patient's nurse    Feels anxious    Motivated to go to the detox    Physical Exam   Vital Signs: Temp: 98.4  F (36.9  C) Temp src: Oral BP: 108/65 Pulse: 67   Resp: 17 SpO2: 94 % O2 Device: None (Room air)    Weight: 326 lbs 8.02 oz    Physical Exam  Cardiovascular:      Rate and Rhythm: Normal rate and regular rhythm.   Pulmonary:      Effort: Pulmonary effort is normal. No respiratory distress.      Breath sounds: Normal breath sounds.   Abdominal:      General: There is no distension.      Palpations: Abdomen " is soft.      Tenderness: There is no abdominal tenderness.          Medical Decision Making       35 MINUTES SPENT BY ME on the date of service doing chart review, history, exam, documentation & further activities per the note.      Data     I have personally reviewed the following data over the past 24 hrs:    6.0  \   10.6 (L)   / 79 (L)     137 100 15.6 /  130 (H)   4.1 27 0.76 \       Imaging results reviewed over the past 24 hrs:   No results found for this or any previous visit (from the past 24 hours).

## 2025-06-01 NOTE — PLAN OF CARE
Goal Outcome Evaluation:  Summary: Vomiting, Diarrhea, Weakness, ETOH   DATE & TIME: 06/01/25 3507-7056  Cognitive Concerns/ Orientation : A&Ox4  BEHAVIOR & AGGRESSION TOOL COLOR: Green  CIWA SCORE: NA   ABNL VS/O2: VSS on RA ex HTN  MOBILITY: Independent  PAIN MANAGMENT: C/o of chronic low back pain - encouraged to use tylenol (refused) and placed lidocaine patches x2  DIET: Cardiac, FR 1800 ml   BOWEL/BLADDER: Continent. Pt stated 8 BM this shift.   ABNL LAB/BG: K 4.1, Mg 1.7 (replaced)  DRAIN/DEVICES: No IV access - MD aware  TELEMETRY RHYTHM: NA  SKIN: Large bruise on right hip/flank, galindo BLE, scab to L knee  TESTS/PROCEDURES: NA  D/C DATE/GOALS/PLACE: Discharge to [facility] on Monday.   OTHER IMPORTANT INFO: Discontinued lactulose d/t frequency. Will start rifaximin tomorrow morning. C/o of heartburn - gave TUMS x1. Psych consulted for anxiety.

## 2025-06-02 ENCOUNTER — PATIENT OUTREACH (OUTPATIENT)
Dept: CARE COORDINATION | Facility: CLINIC | Age: 55
End: 2025-06-02
Payer: COMMERCIAL

## 2025-06-02 VITALS
DIASTOLIC BLOOD PRESSURE: 86 MMHG | HEIGHT: 70 IN | HEART RATE: 73 BPM | BODY MASS INDEX: 45.1 KG/M2 | RESPIRATION RATE: 18 BRPM | OXYGEN SATURATION: 96 % | WEIGHT: 315 LBS | TEMPERATURE: 99.3 F | SYSTOLIC BLOOD PRESSURE: 150 MMHG

## 2025-06-02 LAB
ANION GAP SERPL CALCULATED.3IONS-SCNC: 8 MMOL/L (ref 7–15)
BUN SERPL-MCNC: 17.9 MG/DL (ref 6–20)
CALCIUM SERPL-MCNC: 9.4 MG/DL (ref 8.8–10.4)
CHLORIDE SERPL-SCNC: 103 MMOL/L (ref 98–107)
CREAT SERPL-MCNC: 0.82 MG/DL (ref 0.67–1.17)
EGFRCR SERPLBLD CKD-EPI 2021: >90 ML/MIN/1.73M2
ERYTHROCYTE [DISTWIDTH] IN BLOOD BY AUTOMATED COUNT: 18.6 % (ref 10–15)
GLUCOSE SERPL-MCNC: 104 MG/DL (ref 70–99)
HCO3 SERPL-SCNC: 27 MMOL/L (ref 22–29)
HCT VFR BLD AUTO: 32.9 % (ref 40–53)
HGB BLD-MCNC: 11 G/DL (ref 13.3–17.7)
MAGNESIUM SERPL-MCNC: 1.8 MG/DL (ref 1.7–2.3)
MCH RBC QN AUTO: 33.8 PG (ref 26.5–33)
MCHC RBC AUTO-ENTMCNC: 33.4 G/DL (ref 31.5–36.5)
MCV RBC AUTO: 101 FL (ref 78–100)
PLATELET # BLD AUTO: 86 10E3/UL (ref 150–450)
POTASSIUM SERPL-SCNC: 4.2 MMOL/L (ref 3.4–5.3)
RBC # BLD AUTO: 3.25 10E6/UL (ref 4.4–5.9)
SODIUM SERPL-SCNC: 138 MMOL/L (ref 135–145)
WBC # BLD AUTO: 7.3 10E3/UL (ref 4–11)

## 2025-06-02 PROCEDURE — 99254 IP/OBS CNSLTJ NEW/EST MOD 60: CPT | Performed by: PSYCHIATRY & NEUROLOGY

## 2025-06-02 PROCEDURE — 36415 COLL VENOUS BLD VENIPUNCTURE: CPT | Performed by: STUDENT IN AN ORGANIZED HEALTH CARE EDUCATION/TRAINING PROGRAM

## 2025-06-02 PROCEDURE — 85048 AUTOMATED LEUKOCYTE COUNT: CPT | Performed by: STUDENT IN AN ORGANIZED HEALTH CARE EDUCATION/TRAINING PROGRAM

## 2025-06-02 PROCEDURE — 250N000013 HC RX MED GY IP 250 OP 250 PS 637: Performed by: STUDENT IN AN ORGANIZED HEALTH CARE EDUCATION/TRAINING PROGRAM

## 2025-06-02 PROCEDURE — 250N000013 HC RX MED GY IP 250 OP 250 PS 637: Performed by: INTERNAL MEDICINE

## 2025-06-02 PROCEDURE — 83735 ASSAY OF MAGNESIUM: CPT | Performed by: INTERNAL MEDICINE

## 2025-06-02 PROCEDURE — 99239 HOSP IP/OBS DSCHRG MGMT >30: CPT | Performed by: INTERNAL MEDICINE

## 2025-06-02 PROCEDURE — 250N000012 HC RX MED GY IP 250 OP 636 PS 637: Performed by: STUDENT IN AN ORGANIZED HEALTH CARE EDUCATION/TRAINING PROGRAM

## 2025-06-02 PROCEDURE — 82310 ASSAY OF CALCIUM: CPT | Performed by: STUDENT IN AN ORGANIZED HEALTH CARE EDUCATION/TRAINING PROGRAM

## 2025-06-02 RX ORDER — MAGNESIUM OXIDE 400 MG/1
400 TABLET ORAL EVERY 4 HOURS
Status: DISCONTINUED | OUTPATIENT
Start: 2025-06-02 | End: 2025-06-02 | Stop reason: HOSPADM

## 2025-06-02 RX ADMIN — GABAPENTIN 100 MG: 100 CAPSULE ORAL at 01:55

## 2025-06-02 RX ADMIN — LIDOCAINE 2 PATCH: 4 PATCH TOPICAL at 08:34

## 2025-06-02 RX ADMIN — SPIRONOLACTONE 25 MG: 25 TABLET ORAL at 08:35

## 2025-06-02 RX ADMIN — THIAMINE HCL TAB 100 MG 100 MG: 100 TAB at 08:35

## 2025-06-02 RX ADMIN — Medication 400 MG: at 08:35

## 2025-06-02 RX ADMIN — FUROSEMIDE 20 MG: 20 TABLET ORAL at 08:35

## 2025-06-02 RX ADMIN — PREDNISOLONE SODIUM PHOSPHATE 30 MG: 15 SOLUTION ORAL at 08:46

## 2025-06-02 RX ADMIN — CARVEDILOL 25 MG: 25 TABLET, FILM COATED ORAL at 08:35

## 2025-06-02 RX ADMIN — MULTIVITAMIN TABLET 1 TABLET: TABLET at 08:35

## 2025-06-02 RX ADMIN — RIFAXIMIN 550 MG: 550 TABLET ORAL at 08:35

## 2025-06-02 RX ADMIN — FOLIC ACID 1 MG: 1 TABLET ORAL at 08:35

## 2025-06-02 RX ADMIN — PANTOPRAZOLE SODIUM 40 MG: 40 TABLET, DELAYED RELEASE ORAL at 08:35

## 2025-06-02 ASSESSMENT — ACTIVITIES OF DAILY LIVING (ADL)
ADLS_ACUITY_SCORE: 45

## 2025-06-02 NOTE — PLAN OF CARE
Physical Therapy Discharge Summary    Reason for therapy discharge:    Discharged to IP CD treatment program    Progress towards therapy goal(s). See goals on Care Plan in [facility] electronic health record for goal details.  Goals partially met.  Barriers to achieving goals:   discharge from facility.    Therapy recommendation(s):    Pt progressing well with IP therapy. Anticipate he will be able to discharge directly to CD treatment program rather than previously recommended TCU. May benefit from use of FWW for balance and activity tolerance benefits, would need one issued at time of discharge.

## 2025-06-02 NOTE — PROGRESS NOTES
Care Management Discharge Note    Discharge Date: 06/02/2025       Discharge Disposition: Inpatient Chemical Dependency ([facility] at their [place] location.)    Discharge Services: None    Discharge DME:  (possibly a walker, up to PT)    [facility]:  ([facility] or [facility])    Private pay costs discussed: Not applicable    Does the patient's insurance plan have a 3 day qualifying hospital stay waiver?  No    PAS Confirmation Code:    Patient/family educated on Medicare website which has current facility and service quality ratings:      Education Provided on the Discharge Plan: Yes  Persons Notified of Discharge Plans: yes  Patient/Family in Agreement with the Plan: yes    Handoff Referral Completed: No, handoff not indicated or clinically appropriate    Additional Information:  Pt is discharging today at 10:30 am to [facility]  program in [place].     [facility] is providing transport from [facility] to the facility in [place].     JIMBO faxed over discharge orders to [phone] and discharge packet and medications were sent with pt.         ANUPAMA CoyW

## 2025-06-02 NOTE — PLAN OF CARE
Goal Outcome Evaluation:                  6/1/25 - 6/2/25  [id number]  Cognitive Concerns/ Orientation : Alert and oriented x 4   BEHAVIOR & AGGRESSION TOOL COLOR: Green  CIWA SCORE: NA   ABNL VS/O2: VSS on room air   MOBILITY: Independent  PAIN MANAGMENT: C/o of chronic low back pain - encouraged to use tylenol (refused) lidocaine patches during the day, refused offers of hot/cold packs    DIET: Cardiac, FR 1800 ml   BOWEL/BLADDER: Continent. Pt stated 1 BM during the night   ABNL LAB/BG: K 4.1, Mg 1.7 (replaced)  DRAIN/DEVICES: No IV access - MD aware  TELEMETRY RHYTHM: NA  SKIN: Large bruise on right hip/flank, galindo BLE, scab to L knee  TESTS/PROCEDURES: NA  D/C DATE/GOALS/PLACE: Discharge to [facility] on Monday.   OTHER IMPORTANT INFO: Discontinued lactulose d/t frequency. Will start rifaximin this morning.  Psych consulted for anxiety.

## 2025-06-02 NOTE — PLAN OF CARE
Goal Outcome Evaluation:       Summary: Vomiting, Diarrhea, Weakness, ETOH   DATE & TIME: 6/2/25 [id number]  Cognitive Concerns/ Orientation : Alert and oriented x 4   BEHAVIOR & AGGRESSION TOOL COLOR: Green  CIWA SCORE: NA   ABNL VS/O2: VSS on room air   MOBILITY: Independent  PAIN MANAGMENT: C/o of chronic low back pain - Lidocaine patches applied   DIET: Cardiac, FR 1800 ml   BOWEL/BLADDER: Continent  ABNL LAB/BG: Hemoglobin 11  DRAIN/DEVICES: No IV access - MD aware  TELEMETRY RHYTHM: NA  SKIN: Large bruise on right hip/flank, galindo BLE, scab to L knee  TESTS/PROCEDURES: NA  D/C DATE/GOALS/PLACE: Discharge to [facility] on Monday at 10:30   OTHER IMPORTANT INFO: Discontinued lactulose d/t frequency.  Started rifaximin this morning.  Psych consulted for anxiety- no new medications.    .Discharge    Patient discharged to [place] via transport with transport  Care plan note complete    Listed belongings gathered and given to patient (including from security/pharmacy). Yes  Care Plan and Patient education resolved: Yes  Prescriptions if needed, hard copies sent with patient  Yes  Medication Bin checked and emptied on discharge Yes  SW/care coordinator/charge RN aware of discharge: Yes

## 2025-06-02 NOTE — PROGRESS NOTES
Care Management Follow Up    Length of Stay (days): 10    Expected Discharge Date: 06/02/2025     Concerns to be Addressed: discharge planning     Patient plan of care discussed at interdisciplinary rounds: Yes    Anticipated Discharge Disposition: Inpatient Chemical Dependency ([facility] at their [place] location.)              Anticipated Discharge Services: None  Anticipated Discharge DME:  (possibly a walker, up to PT)    Patient/family educated on Medicare website which has current facility and service quality ratings:    Education Provided on the Discharge Plan: Yes  Patient/Family in Agreement with the Plan: yes    Referrals Placed by CM/SW: Post Acute Facilities  Private pay costs discussed: Not applicable    Discussed  Partnership in Safe Discharge Planning  document with patient/family: No     Handoff Completed: No, handoff not indicated or clinically appropriate    Additional Information:  JIMBO called [facility] admissions regarding possible intake today. [facility] is able to take today. [facility] is providing transport.   Will be at door 6 at about 10:30am  JIMBO updated Charge, Charge requested discharge orders.   JIMBO updated HUC and Bedside nurse, Nurse is updating pt on discharge timing and plan.          Next Steps: Fax discharge orders to [phone]    Addendum 1026: Discharge orders faxed to [phone].    KEN Coy
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
## 2025-06-02 NOTE — DISCHARGE SUMMARY
[facility]    Discharge Summary  Hospitalist    Date of Admission:  5/22/2025  Date of Discharge:  6/2/2025 10:29 AM  Discharging Provider: Cherelle Singletary MD    Discharge Diagnoses   Acute liver failure without hepatic coma    History of Present Illness   Please review admission history and physical.    Hospital Course   Rakesh Carey was admitted on 5/22/2025.  The following problems were addressed during his hospitalization:    Active Problems:    Severe obesity (BMI >= 40) (H)    Hypokalemia    Hypomagnesemia    Generalized weakness    Acute liver failure without hepatic coma  Patient is a 54-year-old male who presented to the ER with the past medical history hypertension, alcohol abuse with progressive whole body weakness and decreased appetite for 2 weeks.  Found to have alcohol hepatitis with likely alcoholic cirrhosis        Alcoholic Hepatitis  Alcoholic Cirrhosis, new diagnosis with Anasarca  Alcoholic Ketoacidosis  Hypoalbuminemia  Obesity  Deconditioning  Hepatic Encephalopathy  MELD 19, Madrey score 32,   CT showed Hepatic steatosis, hepatomegaly, early cirrhosis, and small volume ascites, patient was seen by GI team, LFTs are trending down, statin was on hold in setting of liver injury, patient was started on prednisolone 40 mg daily with plan to taper it down to 10 mg each, it did not make a big difference in the LFTs, patient to continue lactulose but he refused so it was transitioned to rifaximin.  Patient needs to have outpatient follow-up with Dr. Phoenix EASTON.  Naltrexone was not given due to elevated LFTs.  Patient was transferred to inpatient addiction treatment center.       Norovirus infection Resolved  -- Continue supportive care  - Norovirus infection resolved patient continues to have diarrhea due to lactulose     Distended gallbladder with gallbladder sludge and mild wall thickening   Hemorroidal rectal bleeding  No RUQ tenderness, ALT wnl at 62 while   likely from ETOH use  In regards to Vomiting/diarrhea, could be enteritis due to noro virus  - Ultrasound 5/22/25  Borderline wall thickness. Trace pericholecystic/perihepatic fluid. No sonographic Alaniz sign.  -Continue to monitor        LARISA Resolved  Likely 2/2 renal congestion  -Resolved  -Continue Lasix and aldactone        # Anxiety   - Will consult psychiatry        Alcohol Use w/ active Wtihdrawal  GERD  Last drink 1 day PTA. No hx prior withdrawal  --thiamine/folate/gabapentin/clonidine ordered in conjugction with [facility] protocol  --CD ordered per patient preference. He initially declined services; now agrees to rehab so they have been re-consulted.   --resumed PTA PPI  -Not scoring currently on CIWA   -Clonidine tapered down to 0.1 mg daily and will stop clonidine today     Hypomagnesemia  Hypokalemia  --telemetry  --RN based repletion ordered  --Hold PTA hydrochlorothiazide     Anemia  Thrombocytopenia  Likely 2/2 liver disease  --trend Hb/plts  -Platelet count stable at 75   -Hemoglobin stable at 11.3      HTN  HLD  --hold statin given hepatitis  --resumed coreg but not hydrochlorothiazide given hypokalemia     5 cm complex left renal cyst  --outpatient follow-up w/ renal mass CT  -Urology referral  -Discussed with the patient about complex renal cyst follow-up     Chronic back pain   Lumbar spolndylosis W/ radiculopathy  S/p B/l L5-S1 TFESI 1/2025  --max tylenol 2g  --Since Oxycodone not allowed at Detox program   Patient can use tyelonol upto 2g and also lidocaine patches           Cherelle Singletary MD    Significant Results and Procedures       Pending Results     Unresulted Labs Ordered in the Past 30 Days of this Admission       No orders found from 4/22/2025 to 5/23/2025.            Code Status   Full Code       Primary Care Physician   Carrington Rowe    Physical Exam   Temp: 99.3  F (37.4  C) Temp src: Axillary BP: (!) 150/86 Pulse: 73   Resp: 18 SpO2: 96 % O2 Device: None (Room air)    Vitals:     05/23/25 1008 05/26/25 0408 05/26/25 2347   Weight: (!) 146.6 kg (323 lb 3.1 oz) (!) 148.6 kg (327 lb 9.7 oz) (!) 148.1 kg (326 lb 8 oz)     Vital Signs with Ranges  Temp:  [97.8  F (36.6  C)-99.3  F (37.4  C)] 99.3  F (37.4  C)  Pulse:  [67-73] 73  Resp:  [17-18] 18  BP: (108-150)/(65-86) 150/86  SpO2:  [94 %-96 %] 96 %  I/O last 3 completed shifts:  In: 600 [P.O.:600]  Out: -     The patient was examined on the day of discharge.    Discharge Disposition   Discharged to inpatient addiction treatment center  Condition at discharge: Stable    Consultations This Hospital Stay   CARE MANAGEMENT / SOCIAL WORK IP CONSULT  CHEMICAL DEPENDENCY IP CONSULT  GASTROENTEROLOGY IP CONSULT  PHYSICAL THERAPY ADULT IP CONSULT  PHYSICAL THERAPY ADULT IP CONSULT  CARE MANAGEMENT / SOCIAL WORK IP CONSULT  VASCULAR ACCESS ADULT IP CONSULT  CHEMICAL DEPENDENCY IP CONSULT  CARE MANAGEMENT / SOCIAL WORK IP CONSULT  PHYSICAL THERAPY ADULT IP CONSULT  OCCUPATIONAL THERAPY ADULT IP CONSULT  PSYCHIATRY IP CONSULT    Time Spent on this Encounter   I, Cherelle Singletary MD, personally saw the patient today and spent greater than 30 minutes discharging this patient.    Discharge Orders      Adult Urology  Referral      Activity - Up with nursing assistance     Discharge Medications   Current Discharge Medication List        START taking these medications    Details   acetaminophen (TYLENOL) 325 MG tablet Take 1 tablet (325 mg) by mouth every 8 hours as needed for mild pain.  Qty: 30 tablet, Refills: 0    Associated Diagnoses: Lumbar back pain with radiculopathy affecting lower extremity      folic acid (FOLVITE) 1 MG tablet Take 1 tablet (1 mg) by mouth daily.  Qty: 30 tablet, Refills: 0    Associated Diagnoses: Alcoholic hepatitis, unspecified whether ascites present (H)      furosemide (LASIX) 20 MG tablet Take 1 tablet (20 mg) by mouth daily.  Qty: 30 tablet, Refills: 0    Associated Diagnoses: Alcoholic hepatitis, unspecified whether  ascites present (H); Alcoholic cirrhosis, unspecified whether ascites present (H)      gabapentin (NEURONTIN) 100 MG capsule Take 1 capsule (100 mg) by mouth 3 times daily.  Qty: 90 capsule, Refills: 0    Associated Diagnoses: Alcohol abuse      lactulose (CHRONULAC) 10 GM/15ML solution Take 30 mLs (20 g) by mouth daily. Hold after 2 bowel movements    Associated Diagnoses: Alcoholic hepatitis, unspecified whether ascites present (H); Alcohol abuse; Alcoholic cirrhosis, unspecified whether ascites present (H)      Lidocaine (LIDOCARE) 4 % Patch Place 2 patches over 12 hours onto the skin every 24 hours. To prevent lidocaine toxicity, patient should be patch free for 12 hrs daily.  Qty: 60 patch, Refills: 0    Associated Diagnoses: Lumbar back pain with radiculopathy affecting lower extremity      melatonin 5 MG tablet Take 1 tablet (5 mg) by mouth every evening as needed for sleep.    Associated Diagnoses: Other insomnia      oxyCODONE (ROXICODONE) 5 MG tablet Take 0.5 tablets (2.5 mg) by mouth every 8 hours as needed for severe pain.  Qty: 9 tablet, Refills: 0    Associated Diagnoses: Lumbar back pain with radiculopathy affecting lower extremity      prednisoLONE (ORAPRED) 15 MG/5 ML solution Take 10 mLs (30 mg) by mouth daily for 7 days, THEN 6.67 mLs (20 mg) daily for 7 days, THEN 3.33 mLs (10 mg) daily for 7 days.    Associated Diagnoses: Alcoholic hepatitis, unspecified whether ascites present (H)      rifaximin (XIFAXAN) 550 MG TABS tablet Take 1 tablet (550 mg) by mouth 2 times daily.    Associated Diagnoses: Alcoholic cirrhosis, unspecified whether ascites present (H)      spironolactone (ALDACTONE) 25 MG tablet Take 1 tablet (25 mg) by mouth daily.  Qty: 30 tablet, Refills: 0    Associated Diagnoses: Alcoholic hepatitis, unspecified whether ascites present (H); Alcoholic cirrhosis, unspecified whether ascites present (H)      thiamine (B-1) 100 MG tablet Take 1 tablet (100 mg) by mouth daily.  Qty: 30  tablet, Refills: 0    Associated Diagnoses: Alcoholic hepatitis, unspecified whether ascites present (H)           CONTINUE these medications which have CHANGED    Details   carvedilol (COREG) 25 MG tablet Take 1 tablet (25 mg) by mouth 2 times daily (with meals).  Qty: 60 tablet, Refills: 0    Associated Diagnoses: Essential hypertension with goal blood pressure less than 140/90      omeprazole (PRILOSEC) 40 MG DR capsule Take 1 capsule (40 mg) by mouth daily.  Qty: 30 capsule, Refills: 0    Associated Diagnoses: Gastroesophageal reflux disease with esophagitis without hemorrhage           CONTINUE these medications which have NOT CHANGED    Details   multivitamin, therapeutic (THERA-VIT) TABS tablet Take 1 tablet by mouth daily.           STOP taking these medications       atorvastatin (LIPITOR) 20 MG tablet Comments:   Reason for Stopping:         hydrochlorothiazide (HYDRODIURIL) 25 MG tablet Comments:   Reason for Stopping:         HYDROcodone-acetaminophen (NORCO) 5-325 MG tablet Comments:   Reason for Stopping:         ibuprofen (ADVIL/MOTRIN) 800 MG tablet Comments:   Reason for Stopping:             Allergies   Allergies   Allergen Reactions    Shellfish Allergy Nausea and Vomiting and Rash     Data   Most Recent 3 CBC's:  Recent Labs   Lab Test 06/02/25  0642 06/01/25  0534 05/31/25  0551   WBC 7.3 6.0 5.7   HGB 11.0* 10.6* 10.3*   * 104* 102*   PLT 86* 79* 73*      Most Recent 3 BMP's:  Recent Labs   Lab Test 06/02/25  0642 06/01/25  0534 05/31/25  0551    137 135   POTASSIUM 4.2 4.1 4.3   CHLORIDE 103 100 100   CO2 27 27 28   BUN 17.9 15.6 14.3   CR 0.82 0.76 0.82   ANIONGAP 8 10 7   CINDA 9.4 9.4 9.5   * 130* 115*     Most Recent 2 LFT's:  Recent Labs   Lab Test 05/29/25  0715 05/28/25  0722   AST 78* 100*   ALT 46 51   ALKPHOS 173* 204*   BILITOTAL 2.1* 2.5*     Most Recent INR's and Anticoagulation Dosing History:  Anticoagulation Dose History  More data may exist         Latest Ref  Rng & Units 5/22/2025 5/24/2025 5/25/2025 5/26/2025 5/27/2025 5/28/2025 5/29/2025   Recent Dosing and Labs   INR 0.85 - 1.15 1.56  1.58  1.61  1.56  1.48  1.42  1.41      Most Recent 3 Troponin's:No lab results found.  Most Recent Cholesterol Panel:  Recent Labs   Lab Test 08/13/24  1618   CHOL 193   *   HDL 40   TRIG 125     Most Recent 6 Bacteria Isolates From Any Culture (See EPIC Reports for Culture Details):No lab results found.  Most Recent TSH, T4 and A1c Labs:  Recent Labs   Lab Test 06/19/23  1743 06/13/22  1639 06/10/21  1652   TSH  --   --  1.92   A1C 6.0*   < > 5.6    < > = values in this interval not displayed.     Results for orders placed or performed during the hospital encounter of 05/22/25   CT Abdomen Pelvis w Contrast    Narrative    EXAM: CT ABDOMEN PELVIS W CONTRAST  LOCATION: [facility]  DATE: 5/22/2025    INDICATION: Assess for ascites.  COMPARISON: No prior abdomen or pelvis CT are available for review at time of dictation.  TECHNIQUE: CT scan of the abdomen and pelvis was performed following injection of IV contrast. Multiplanar reformats were obtained. Dose reduction techniques were used.  CONTRAST: 135 mL Isovue 370    FINDINGS:   LOWER CHEST: Normal.    HEPATOBILIARY: Diffuse severe hepatic steatosis and hepatomegaly (24 cm craniocaudal) with widened hepatic fissure, widened periportal space, and recanalized periumbilical veins, suggesting early changes of cirrhosis. Small volume simple density   nonloculated ascites throughout all 4 abdominal quadrants. Nonthrombosed portal venous system. Gallbladder distention with probable layering gallstones. If the patient has pain in the right upper quadrant, consider right upper quadrant ultrasound to   assess for cholecystitis.    PANCREAS: Normal.    SPLEEN: Normal.    ADRENAL GLANDS: Normal.    KIDNEYS/BLADDER: Several simple appearing bilateral renal cysts, but there is a more complex appearing nearly 5 cm cyst  in the mid left kidney (series 3, image 41) with possible internal septation with enhancing septal nodularity (image 40).    BOWEL: No gastric or bowel distention. Normal appendix. Diverticulosis coli without evidence of acute diverticulitis, but the presence of generalized third spacing and ascites will reduce the sensitivity for diverticulitis. No pneumoperitoneum.    LYMPH NODES: Normal.    VASCULATURE: Normal.    PELVIC ORGANS: Possible inflammation surrounding the urinary bladder. Correlate with urinalysis.    MUSCULOSKELETAL: Normal.      Impression    IMPRESSION:   1.  Hepatic steatosis, hepatomegaly, early cirrhosis, and small volume ascites.  2.  Gallbladder distention with probable cholelithiasis. If there is suspicion for acute cholecystitis, consider right upper quadrant ultrasound.  3.  Possible urinary bladder infection. Correlate with urinalysis.  4.  Nearly 5 cm complex left renal cyst. On an elective nonemergent basis, this warrants renal mass protocol CT.   US Abdomen Limited    Narrative    EXAM: US ABDOMEN LIMITED  LOCATION: [facility]  DATE: 5/22/2025    INDICATION: gallbladder thickening on us  COMPARISON: 5/22/2025  TECHNIQUE: Limited abdominal ultrasound.    FINDINGS:    GALLBLADDER: Gallbladder sludge without stones. Borderline wall thickness. Trace pericholecystic/perihepatic fluid. No sonographic Alaniz sign.    BILE DUCTS: Not well visualized. The common duct measures 5 mm.    LIVER: Hepatic steatosis Limited assessment. The portal vein is patent with flow in the normal direction.    RIGHT KIDNEY: No hydronephrosis.    PANCREAS: The pancreas is largely obscured by overlying gas.    Trace ascites.      Impression    IMPRESSION:  1.  Distended gallbladder with gallbladder sludge and mild wall thickening. Trace ascites. Sonographic findings are nonspecific. Consider MRCP or biliary scintigraphy as warranted clinically.       US Abdomen Limited    Narrative    EXAM:  US ABDOMEN LIMITED  LOCATION: [facility]  DATE: 5/28/2025    INDICATION: cirrhosis  COMPARISON: 5/22/2025  TECHNIQUE: Limited abdominal ultrasound.    FINDINGS:    GALLBLADDER: Contracted gallbladder which limits evaluation. No definite shadowing echogenic calculi. No gallbladder wall thickening or pericholecystic fluid. Sonographic Alaniz sign is negative.    BILE DUCTS: No biliary dilatation. The common duct measures 4 mm.    LIVER: Hepatic steatosis which limits visualization of the parenchyma. No definite focal mass. The portal vein is patent with flow in the normal direction.    RIGHT KIDNEY: No hydronephrosis.    PANCREAS: The visualized portions are normal.    Trace perihepatic ascites.      Impression    IMPRESSION:  1.  Hepatic steatosis which limits visualization of the parenchyma. No definite focal liver mass.  2.  Trace perihepatic ascites.

## 2025-06-03 ENCOUNTER — PATIENT OUTREACH (OUTPATIENT)
Dept: FAMILY MEDICINE | Facility: CLINIC | Age: 55
End: 2025-06-03
Payer: COMMERCIAL

## 2025-06-03 NOTE — TELEPHONE ENCOUNTER
Called #   Telephone Information:   Mobile [phone]       Transitions of Care Outreach  Chief Complaint   Patient presents with    Hospital F/U       Most Recent Admission Date: 5/22/2025   Most Recent Admission Diagnosis: Hypokalemia - E87.6  Hypomagnesemia - E83.42  Generalized weakness - R53.1  Severe obesity (BMI >= 40) (H) - E66.01  Acute liver failure without hepatic coma - K72.00     Most Recent Discharge Date: 6/2/2025   Most Recent Discharge Diagnosis: Acute liver failure without hepatic coma - K72.00  Severe obesity (BMI >= 40) (H) - E66.01  Generalized weakness - R53.1  Hypomagnesemia - E83.42  Hypokalemia - E87.6  Alcoholic hepatitis, unspecified whether ascites present (H) - K70.10  Alcohol abuse - F10.10  Lumbar back pain with radiculopathy affecting lower extremity - M54.16  Other insomnia - G47.09  Alcoholic cirrhosis, unspecified whether ascites present (H) - K70.30  Renal cyst - N28.1  Essential hypertension with goal blood pressure less than 140/90 - I10  Gastroesophageal reflux disease with esophagitis without hemorrhage - K21.00     Transitions of Care Assessment    Discharge Assessment  How are you doing now that you are home?: [facility] am a treatment center  How are your symptoms? (Red Flag symptoms escalate to triage hotline per guidelines): Improved  Do you know how to contact your clinic care team if you have future questions or changes to your health status? : Yes  Does the patient have their discharge instructions? : Yes  Does the patient have questions regarding their discharge instructions? : Yes (see comment) (he needs to get his [facility] paper work done)  Were you started on any new medications or were there changes to any of your previous medications? : No  Does the patient have all of their medications?: Yes  Do you have questions regarding any of your medications? : No  Do you have all of your needed medical supplies or equipment (DME)?  (i.e. oxygen tank, CPAP, cane, etc.): Yes    Follow  up Plan     Discharge Follow-Up  Discharge follow up appointment scheduled in alignment with recommended follow up timeframe or Transitions of Risk Category? (Low = within 30 days; Moderate= within 14 days; High= within 7 days): Yes  Discharge Follow Up Appointment Date: 06/06/25  Discharge Follow Up Appointment Scheduled with?: Primary Care Provider    Future Appointments   Date Time Provider Department [place]   6/6/2025  2:30 PM Carrington Rowe, DO [facility]       Outpatient Plan as outlined on AVS reviewed with patient.    For any urgent concerns, please contact our 24 hour nurse triage line: [phone] ([phone])       Carmina Quezada RN

## 2025-06-04 ENCOUNTER — PATIENT OUTREACH (OUTPATIENT)
Dept: CARE COORDINATION | Facility: CLINIC | Age: 55
End: 2025-06-04
Payer: COMMERCIAL

## 2025-06-06 ENCOUNTER — HOSPITAL ENCOUNTER (EMERGENCY)
Facility: CLINIC | Age: 55
Discharge: HOME | End: 2025-06-06
Attending: EMERGENCY MEDICINE | Admitting: EMERGENCY MEDICINE
Payer: COMMERCIAL

## 2025-06-06 VITALS
TEMPERATURE: 99.5 F | RESPIRATION RATE: 11 BRPM | OXYGEN SATURATION: 94 % | SYSTOLIC BLOOD PRESSURE: 106 MMHG | DIASTOLIC BLOOD PRESSURE: 85 MMHG | HEART RATE: 83 BPM
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
## 2025-06-06 DIAGNOSIS — F11.90 OPIOID USE: ICD-10-CM

## 2025-06-06 LAB
ALBUMIN SERPL BCG-MCNC: 3.4 G/DL (ref 3.5–5.2)
ALP SERPL-CCNC: 104 U/L (ref 40–150)
ALT SERPL W P-5'-P-CCNC: 42 U/L (ref 0–70)
ANION GAP SERPL CALCULATED.3IONS-SCNC: 12 MMOL/L (ref 7–15)
AST SERPL W P-5'-P-CCNC: 46 U/L (ref 0–45)
ATRIAL RATE - MUSE: 96 BPM
BASOPHILS # BLD AUTO: 0.1 10E3/UL (ref 0–0.2)
BASOPHILS NFR BLD AUTO: 1 %
BILIRUB SERPL-MCNC: 2.2 MG/DL
BUN SERPL-MCNC: 17.5 MG/DL (ref 6–20)
CALCIUM SERPL-MCNC: 9.2 MG/DL (ref 8.8–10.4)
CHLORIDE SERPL-SCNC: 101 MMOL/L (ref 98–107)
CREAT SERPL-MCNC: 0.86 MG/DL (ref 0.67–1.17)
DIASTOLIC BLOOD PRESSURE - MUSE: NORMAL MMHG
EGFRCR SERPLBLD CKD-EPI 2021: >90 ML/MIN/1.73M2
EOSINOPHIL # BLD AUTO: 0.3 10E3/UL (ref 0–0.7)
EOSINOPHIL NFR BLD AUTO: 3 %
ERYTHROCYTE [DISTWIDTH] IN BLOOD BY AUTOMATED COUNT: 16.2 % (ref 10–15)
GLUCOSE SERPL-MCNC: 120 MG/DL (ref 70–99)
HCO3 SERPL-SCNC: 21 MMOL/L (ref 22–29)
HCT VFR BLD AUTO: 35.6 % (ref 40–53)
HGB BLD-MCNC: 11.3 G/DL (ref 13.3–17.7)
HOLD SPECIMEN: NORMAL
IMM GRANULOCYTES # BLD: 0.1 10E3/UL
IMM GRANULOCYTES NFR BLD: 1 %
INTERPRETATION ECG - MUSE: NORMAL
LYMPHOCYTES # BLD AUTO: 0.5 10E3/UL (ref 0.8–5.3)
LYMPHOCYTES NFR BLD AUTO: 5 %
MCH RBC QN AUTO: 33 PG (ref 26.5–33)
MCHC RBC AUTO-ENTMCNC: 31.7 G/DL (ref 31.5–36.5)
MCV RBC AUTO: 104 FL (ref 78–100)
MONOCYTES # BLD AUTO: 0.8 10E3/UL (ref 0–1.3)
MONOCYTES NFR BLD AUTO: 7 %
NEUTROPHILS # BLD AUTO: 8.8 10E3/UL (ref 1.6–8.3)
NEUTROPHILS NFR BLD AUTO: 84 %
NRBC # BLD AUTO: 0 10E3/UL
NRBC BLD AUTO-RTO: 0 /100
P AXIS - MUSE: 20 DEGREES
PLATELET # BLD AUTO: 123 10E3/UL (ref 150–450)
POTASSIUM SERPL-SCNC: 4.4 MMOL/L (ref 3.4–5.3)
PR INTERVAL - MUSE: 158 MS
PROT SERPL-MCNC: 7.1 G/DL (ref 6.4–8.3)
QRS DURATION - MUSE: 84 MS
QT - MUSE: 346 MS
QTC - MUSE: 437 MS
R AXIS - MUSE: 49 DEGREES
RBC # BLD AUTO: 3.42 10E6/UL (ref 4.4–5.9)
SODIUM SERPL-SCNC: 134 MMOL/L (ref 135–145)
SYSTOLIC BLOOD PRESSURE - MUSE: NORMAL MMHG
T AXIS - MUSE: 18 DEGREES
VENTRICULAR RATE- MUSE: 96 BPM
WBC # BLD AUTO: 10.5 10E3/UL (ref 4–11)

## 2025-06-06 PROCEDURE — 80053 COMPREHEN METABOLIC PANEL: CPT | Performed by: EMERGENCY MEDICINE

## 2025-06-06 PROCEDURE — 85025 COMPLETE CBC W/AUTO DIFF WBC: CPT | Performed by: EMERGENCY MEDICINE

## 2025-06-06 PROCEDURE — 93005 ELECTROCARDIOGRAM TRACING: CPT

## 2025-06-06 PROCEDURE — 36415 COLL VENOUS BLD VENIPUNCTURE: CPT | Performed by: EMERGENCY MEDICINE

## 2025-06-06 PROCEDURE — 99284 EMERGENCY DEPT VISIT MOD MDM: CPT

## 2025-06-06 ASSESSMENT — COLUMBIA-SUICIDE SEVERITY RATING SCALE - C-SSRS
2. HAVE YOU ACTUALLY HAD ANY THOUGHTS OF KILLING YOURSELF IN THE PAST MONTH?: NO
6. HAVE YOU EVER DONE ANYTHING, STARTED TO DO ANYTHING, OR PREPARED TO DO ANYTHING TO END YOUR LIFE?: NO
1. IN THE PAST MONTH, HAVE YOU WISHED YOU WERE DEAD OR WISHED YOU COULD GO TO SLEEP AND NOT WAKE UP?: NO

## 2025-06-06 ASSESSMENT — ACTIVITIES OF DAILY LIVING (ADL)
ADLS_ACUITY_SCORE: 54

## 2025-06-06 NOTE — ED PROVIDER NOTES
Emergency Department Note      History of Present Illness     Chief Complaint   Dizziness      HPI   Rakesh Carey is a 55 year old male who presents to the emergency department from a residential substance use treatment program after he was felt to be more lethargic than normal during the day after recently starting Suboxone.  The patient reports that he feels completely back to normal now and denies any concerns.  No headache, chest pain, shortness of breath, abdominal pain, nausea, vomiting, diarrhea.  He states that he slid down off the edge of the bed earlier and because of his weight was unable to stand back up.  He denies any injuries.  No head injury or loss of consciousness.  He denies any injuries to the extremities.  He is in treatment for alcohol use disorder as well as chronic opioid use for chronic back pain.  He states he has been sober from alcohol for the last 4 weeks and recently started the Suboxone.  Is not had any other opiates.  He denies any fevers.  He denies feeling ill in any other way.  No numbness tingling or weakness.  He denies any other symptoms at this time.    Independent Historian   None    Review of External Notes   None    Past Medical History     Medical History and Problem List   Past Medical History:   Diagnosis Date    Anxiety     BMI 40.0-44.9, adult (H)     Diverticulitis 8/2010    Esophageal ulcer     GERD (gastroesophageal reflux disease)     Head injury 1995    Hyperlipidemia LDL goal < 130     Hypertension goal BP (blood pressure) < 140/90 7/20/2011    Stomach ulcer     Tobacco abuse     Vitamin D deficiencies 3/2009       Medications   buprenorphine HCl-naloxone HCl (SUBOXONE) 8-2 MG per film  carvedilol (COREG) 25 MG tablet  folic acid (FOLVITE) 1 MG tablet  furosemide (LASIX) 20 MG tablet  gabapentin (NEURONTIN) 100 MG capsule  lactulose (CHRONULAC) 10 GM/15ML solution  Lidocaine (LIDOCARE) 4 % Patch  melatonin 5 MG tablet  multivitamin, therapeutic (THERA-VIT)  TABS tablet  omeprazole (PRILOSEC) 40 MG DR capsule  prednisoLONE (ORAPRED) 15 MG/5 ML solution  rifaximin (XIFAXAN) 550 MG TABS tablet  spironolactone (ALDACTONE) 25 MG tablet  thiamine (B-1) 100 MG tablet        Surgical History   Past Surgical History:   Procedure Laterality Date    SURGICAL HISTORY OF -       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 Resting comfortably  Head:  Scalp, face, and head appear normal, atraumatic   Eyes:  Pupils are equal, round, reactive to light EOMI, no nystagmus     Conjunctivae non-injected and sclerae white  ENT:    The external nose is normal    Pinnae are normal  Neck:  Normal range of motion. Cervical spine nontender, no stepoffs     There is no rigidity noted    Trachea is in the midline  CV:  Regular rate and rhythm     Normal S1/S2, no S3/S4    No murmur or rub. Radial pulses 2+ bilaterally.  Resp:  Lungs are clear and equal bilaterally  There is no tachypnea    No increased work of breathing    No rales, wheezing, or rhonchi  GI:  Abdomen is soft, obese, no rigidity or guarding    No distension, or mass    No tenderness or rebound tenderness   MS:  Normal muscular tone. Full painless ROM of all extremities. Extremities non tender to palpation and atraumatic.     Symmetric motor strength    Chronic nonpitting bilateral lower extremity edema  Skin:  No rash or acute skin lesions noted  Neuro:  A&Ox3, GCS 15    CN II - XII intact    Speech clear, fluent, and normal    Strength 5/5 and symmetric in bilateral upper and lower extremities.    No pronator drift. No leg drift. SILT throughout.    No ankle clonus    No tremor.     No meningismus   Psych: Normal affect. Appropriate  interactions.      Diagnostics     Lab Results   Labs Ordered and Resulted from Time of ED Arrival to Time of ED Departure   COMPREHENSIVE METABOLIC PANEL - Abnormal       Result Value    Sodium 134 (*)     Potassium 4.4      Carbon Dioxide (CO2) 21 (*)     Anion Gap 12      Urea Nitrogen 17.5      Creatinine 0.86      GFR Estimate >90      Calcium 9.2      Chloride 101      Glucose 120 (*)     Alkaline Phosphatase 104      AST 46 (*)     ALT 42      Protein Total 7.1      Albumin 3.4 (*)     Bilirubin Total 2.2 (*)    CBC WITH PLATELETS AND DIFFERENTIAL - Abnormal    WBC Count 10.5      RBC Count 3.42 (*)     Hemoglobin 11.3 (*)     Hematocrit 35.6 (*)      (*)     MCH 33.0      MCHC 31.7      RDW 16.2 (*)     Platelet Count 123 (*)     % Neutrophils 84      % Lymphocytes 5      % Monocytes 7      % Eosinophils 3      % Basophils 1      % Immature Granulocytes 1      NRBCs per 100 WBC 0      Absolute Neutrophils 8.8 (*)     Absolute Lymphocytes 0.5 (*)     Absolute Monocytes 0.8      Absolute Eosinophils 0.3      Absolute Basophils 0.1      Absolute Immature Granulocytes 0.1      Absolute NRBCs 0.0         Imaging   No orders to display       EKG   ECG results from 06/06/25   EKG 12 lead     Value    Systolic Blood Pressure     Diastolic Blood Pressure     Ventricular Rate 96    Atrial Rate 96    KY Interval 158    QRS Duration 84        QTc 437    P Axis 20    R AXIS 49    T Axis 18    Interpretation ECG      Sinus rhythm  Normal ECG  When compared with ECG of 22-May-2025 20:00,  No significant change was found  Confirmed by Kayden Lyman MD           Independent Interpretation   None    ED Course      Medications Administered   Medications - No data to display    Procedures   Procedures     Discussion of Management   None    ED Course   ED Course as of 06/06/25 1648   Fri Jun 06, 2025   0356 Patient seen and evaluated        Additional Documentation  None    Medical Decision Making / Diagnosis  "    CMS Diagnoses: None    MIPS   None               [facility]   Rakesh Carey is a 55 year old male presents to the emergency department after staff at his treatment facility reported that he seemed to be more \"lethargic\" than normal after recently starting Suboxone for treatment of chronic opioid use related to pain medications for chronic back pain.  On my evaluation in the emergency department patient is awake alert and interacting appropriately.  There is no evidence of any somnolence or lethargy.  No evidence for any intoxication or sedation.  Patient has no specific concerns or complaints at this time and feels normal.  Neurologic exam is normal with no evidence of stroke or primary central neurologic process.  Laboratory studies are reassuring and consistent with his baseline.  He also has a history of alcohol use disorder but denies drinking recently.  Chronic anemia and thrombocytopenia are slightly improved.  No leukocytosis.  Renal function is normal.  Remainder of the CMP is consistent with his history of recovering significant alcohol use disorder.  He has no abdominal pain or tenderness on examination.  EKG shows normal sinus rhythm without evidence of ischemia or dysrhythmia.  No acute cardiopulmonary process at this time.  ED evaluation is reassuring.  Patient is stable for discharge with close outpatient follow-up.  If he continues to become somnolent after his medication then he will need to work with his outpatient care team to reduce or change his medicines.  Patient is agreeable to plan of care.  Return precautions provided and he was discharged in stable condition.    Disposition   The patient was discharged.     Diagnosis     ICD-10-CM    1. Fall at home, initial encounter  W19.XXXA     Y92.009       2. Opioid use  F11.90            Discharge Medications   Discharge Medication List as of 6/6/2025  4:28 AM            MD Esmer Wilson Ryan Clay, MD  06/06/25 9760    "

## 2025-06-06 NOTE — ED TRIAGE NOTES
Pt bill from [facility] for etoh abuse. Pt was recently started on subaxone - staff reports pt slid out of bed earlier today and has been acting slower than usual and more altered after subaxone administration at 0630 this am. Patient reports feeling lightheaded. Reports last alcohol use over 1 month ago, denies drug use.     Triage Assessment (Adult)       Row Name 06/06/25 0023          Triage Assessment    Airway WDL WDL        Respiratory WDL    Respiratory WDL WDL        Skin Circulation/Temperature WDL    Skin Circulation/Temperature WDL WDL        Cardiac WDL    Cardiac WDL WDL        Peripheral/Neurovascular WDL    Peripheral Neurovascular WDL WDL        Cognitive/Neuro/Behavioral WDL    Cognitive/Neuro/Behavioral WDL X;arousability     Level of Consciousness somnolent         Bj Coma Scale    Best Eye Response 3-->(E3) to speech     Best Motor Response 6-->(M6) obeys commands     Best Verbal Response 5-->(V5) oriented     Jaroso Coma Scale Score 14

## 2025-06-06 NOTE — ED NOTES
Pt states that he feels better now and asking when he can go home. Denies any dizziness and was able to walk to bathroom and back to room. States that he is thirsty and asking for water.    Reanna Dee RN,.......................................... 6/6/2025   3:08 AM

## 2025-06-06 NOTE — ED NOTES
[facility] contacted at this time to discuss patient's discharge - no  till 8am - requesting that patient be sent back to [facility] via cab

## 2025-06-09 NOTE — TELEPHONE ENCOUNTER
Patient called and notified formed has been mailed. and   Forms/Letter ready for     Patient called and notified form/letter is ready for .  Current location of form: at the   Rosalia Parson

## 2025-07-02 ENCOUNTER — OFFICE VISIT (OUTPATIENT)
Dept: FAMILY MEDICINE | Facility: CLINIC | Age: 55
End: 2025-07-02
Payer: COMMERCIAL

## 2025-07-02 ENCOUNTER — TELEPHONE (OUTPATIENT)
Dept: FAMILY MEDICINE | Facility: CLINIC | Age: 55
End: 2025-07-02

## 2025-07-02 VITALS
HEART RATE: 91 BPM | WEIGHT: 307 LBS | OXYGEN SATURATION: 96 % | DIASTOLIC BLOOD PRESSURE: 66 MMHG | RESPIRATION RATE: 16 BRPM | SYSTOLIC BLOOD PRESSURE: 112 MMHG | TEMPERATURE: 98.1 F | BODY MASS INDEX: 43.95 KG/M2 | HEIGHT: 70 IN

## 2025-07-02 DIAGNOSIS — K70.30 ALCOHOLIC CIRRHOSIS, UNSPECIFIED WHETHER ASCITES PRESENT (H): ICD-10-CM

## 2025-07-02 DIAGNOSIS — M54.16 LUMBAR BACK PAIN WITH RADICULOPATHY AFFECTING LOWER EXTREMITY: ICD-10-CM

## 2025-07-02 DIAGNOSIS — K76.82 HEPATIC ENCEPHALOPATHY (H): Primary | ICD-10-CM

## 2025-07-02 LAB
ERYTHROCYTE [DISTWIDTH] IN BLOOD BY AUTOMATED COUNT: 13.8 % (ref 10–15)
HCT VFR BLD AUTO: 33.3 % (ref 40–53)
HGB BLD-MCNC: 10.9 G/DL (ref 13.3–17.7)
MCH RBC QN AUTO: 31.9 PG (ref 26.5–33)
MCHC RBC AUTO-ENTMCNC: 32.7 G/DL (ref 31.5–36.5)
MCV RBC AUTO: 97 FL (ref 78–100)
PLATELET # BLD AUTO: 144 10E3/UL (ref 150–450)
RBC # BLD AUTO: 3.42 10E6/UL (ref 4.4–5.9)
WBC # BLD AUTO: 5.4 10E3/UL (ref 4–11)

## 2025-07-02 PROCEDURE — 3074F SYST BP LT 130 MM HG: CPT | Performed by: FAMILY MEDICINE

## 2025-07-02 PROCEDURE — 3078F DIAST BP <80 MM HG: CPT | Performed by: FAMILY MEDICINE

## 2025-07-02 PROCEDURE — 85027 COMPLETE CBC AUTOMATED: CPT | Performed by: FAMILY MEDICINE

## 2025-07-02 PROCEDURE — 83735 ASSAY OF MAGNESIUM: CPT | Performed by: FAMILY MEDICINE

## 2025-07-02 PROCEDURE — 80053 COMPREHEN METABOLIC PANEL: CPT | Performed by: FAMILY MEDICINE

## 2025-07-02 PROCEDURE — 99214 OFFICE O/P EST MOD 30 MIN: CPT | Performed by: FAMILY MEDICINE

## 2025-07-02 PROCEDURE — 36415 COLL VENOUS BLD VENIPUNCTURE: CPT | Performed by: FAMILY MEDICINE

## 2025-07-02 RX ORDER — FOLIC ACID 1 MG/1
TABLET ORAL
COMMUNITY
Start: 2025-06-24 | End: 2025-07-02

## 2025-07-02 RX ORDER — MULTIVITAMIN/IRON/FOLIC ACID 18MG-0.4MG
TABLET ORAL
COMMUNITY
Start: 2025-06-24

## 2025-07-02 RX ORDER — LIDOCAINE 4 G/G
2 PATCH TOPICAL EVERY 24 HOURS
Qty: 60 PATCH | Refills: 3 | Status: SHIPPED | OUTPATIENT
Start: 2025-07-02

## 2025-07-02 RX ORDER — ATORVASTATIN CALCIUM 20 MG/1
20 TABLET, FILM COATED ORAL DAILY
COMMUNITY

## 2025-07-02 RX ORDER — CLONIDINE HYDROCHLORIDE 0.1 MG/1
0.1 TABLET ORAL 3 TIMES DAILY
COMMUNITY

## 2025-07-02 NOTE — PROGRESS NOTES
"  {PROVIDER CHARTING PREFERENCE:461408}    Subjective   Rakesh is a 55 year old, presenting for the following health issues:  Hospital F/U        7/2/2025    10:22 AM   Additional Questions   Roomed by Orin HUFF CMA     [facility]        Hospital Follow-up Visit:    Hospital/Nursing Home/IP Rehab Facility: [facility]  Date of Admission: 05/22/2025  Date of Discharge: 06/02/2025  Reason(s) for Admission:     Severe obesity (BMI >= 40) (H)    Hypokalemia    Hypomagnesemia    Generalized weakness    Acute liver failure without hepatic coma    Do you have any other stressors you would like to discuss with your provider? { :924079}    Problems taking medications regularly:  None  Medication changes since discharge: {:576197}  Problems adhering to non-medication therapy:  None    Summary of hospitalization:  {:643308}  Diagnostic Tests/Treatments reviewed.  Follow up needed: {:048172:}  Other Healthcare Providers Involved in Patient s Care:         {those currently involved after discharge:789940::\"None\"}  Update since discharge: {:219552} {TIP  Include information from family/caregivers, SNF, Care Coordination :705445}      Sober,    Following up with [facility]    Has pain clinic appointment at  Pain clinic.        Plan of care communicated with {:116679}     {Reference  Coding guidelines- Moderate Complexity F2F/Video within 7 - 14 days of discharge [id number], High Complexity F2F/Video within 7 days [id number] or [id number] days [id number] :606033}      ED/UC Followup:    Facility:  [facility]   Date of visit: 06/06/2025  Reason for visit:     Fall at home, initial encounter    Opioid use    Current Status: Feels fine-went there for precaution after a fall.     {additonal problems for provider to add (Optional):195828}    {ROS Picklists (Optional):073027}      Objective    /66   Pulse 91   Temp 98.1  F (36.7  C) (Tympanic)   Resp 16   Ht 1.778 m (5' 10\")   Wt (!) 139.3 kg (307 lb)  "  SpO2 96%   BMI 44.05 kg/m    Body mass index is 44.05 kg/m .  Physical Exam   {Exam List (Optional):488437}    {Diagnostic Test Results (Optional):538555}        Signed Electronically by: Carrington Rowe DO  {Email feedback regarding this note to [email or web address]   :225700}

## 2025-07-02 NOTE — TELEPHONE ENCOUNTER
Forms/Letter Request    Type of form/letter: FMLA - Unknown       Do we have the form/letter: Yes: Saw pcp today, 7/2/2025    Who is the form from? Patient    Where did/will the form come from? N/a    When is form/letter needed by: as soon as possible    How would you like the form/letter returned:     Patient Notified form requests are processed in 5-7 business days:Yes    Could we send this information to you in [employer] or would you prefer to receive a phone call?:   Patient would prefer a phone call   Okay to leave a detailed message?: Yes at Home number on file [phone] (home)

## 2025-07-02 NOTE — PATIENT INSTRUCTIONS
[facility]; [facility]  [phone] (Work)  [address] [place] [address]  [place] [address]       Call and make sure you have follow up appointment -- ?July 9, 2025

## 2025-07-03 LAB
ALBUMIN SERPL BCG-MCNC: 3.1 G/DL (ref 3.5–5.2)
ALP SERPL-CCNC: 65 U/L (ref 40–150)
ALT SERPL W P-5'-P-CCNC: 11 U/L (ref 0–70)
ANION GAP SERPL CALCULATED.3IONS-SCNC: 10 MMOL/L (ref 7–15)
AST SERPL W P-5'-P-CCNC: 29 U/L (ref 0–45)
BILIRUB SERPL-MCNC: 1 MG/DL
BUN SERPL-MCNC: 6.6 MG/DL (ref 6–20)
CALCIUM SERPL-MCNC: 9.1 MG/DL (ref 8.8–10.4)
CHLORIDE SERPL-SCNC: 103 MMOL/L (ref 98–107)
CREAT SERPL-MCNC: 0.69 MG/DL (ref 0.67–1.17)
EGFRCR SERPLBLD CKD-EPI 2021: >90 ML/MIN/1.73M2
GLUCOSE SERPL-MCNC: 104 MG/DL (ref 70–99)
HCO3 SERPL-SCNC: 23 MMOL/L (ref 22–29)
MAGNESIUM SERPL-MCNC: 1.6 MG/DL (ref 1.7–2.3)
POTASSIUM SERPL-SCNC: 3.9 MMOL/L (ref 3.4–5.3)
PROT SERPL-MCNC: 6.8 G/DL (ref 6.4–8.3)
SODIUM SERPL-SCNC: 136 MMOL/L (ref 135–145)

## 2025-07-08 ENCOUNTER — HOSPITAL ENCOUNTER (OUTPATIENT)
Dept: CT IMAGING | Facility: CLINIC | Age: 55
Discharge: HOME | End: 2025-07-08
Attending: FAMILY MEDICINE
Payer: COMMERCIAL

## 2025-07-08 DIAGNOSIS — N28.1 RENAL CYST, LEFT: ICD-10-CM

## 2025-07-08 PROCEDURE — 74178 CT ABD&PLV WO CNTR FLWD CNTR: CPT

## 2025-07-08 PROCEDURE — 250N000011 HC RX IP 250 OP 636: Performed by: FAMILY MEDICINE

## 2025-07-08 PROCEDURE — 250N000009 HC RX 250: Performed by: FAMILY MEDICINE

## 2025-07-08 RX ORDER — IOPAMIDOL 755 MG/ML
135 INJECTION, SOLUTION INTRAVASCULAR ONCE
Status: COMPLETED | OUTPATIENT
Start: 2025-07-08 | End: 2025-07-08

## 2025-07-08 RX ADMIN — SODIUM CHLORIDE 85 ML: 9 INJECTION, SOLUTION INTRAVENOUS at 06:56

## 2025-07-08 RX ADMIN — IOPAMIDOL 135 ML: 755 INJECTION, SOLUTION INTRAVENOUS at 06:56

## 2025-07-09 ENCOUNTER — MYC MEDICAL ADVICE (OUTPATIENT)
Dept: FAMILY MEDICINE | Facility: CLINIC | Age: 55
End: 2025-07-09
Payer: COMMERCIAL

## 2025-07-09 ENCOUNTER — TELEPHONE (OUTPATIENT)
Dept: FAMILY MEDICINE | Facility: CLINIC | Age: 55
End: 2025-07-09
Payer: COMMERCIAL

## 2025-07-09 NOTE — TELEPHONE ENCOUNTER
Forms/Letter Request    Type of form/letter: FMLA - Unknown       Do we have the form/letter: No    Who is the form from? Patient    Where did/will the form come from? form was faxed in    When is form/letter needed by: asap. Pt needs return to work paperwork asap. Just finished Liver appt. Please call to discuss. Wants to go back to work asap.     How would you like the form/letter returned:     Patient Notified form requests are processed in 5-7 business days:Yes    Could we send this information to you in [employer] or would you prefer to receive a phone call?:   Patient would prefer a phone call   Okay to leave a detailed message?: Yes at Cell number on file:    Telephone Information:   Mobile [phone]

## 2025-07-10 NOTE — TELEPHONE ENCOUNTER
Patient called is requesting a letter to return to work.  Nothing to do with FMLA.  No restrictions.    Can be left at the  and he will come get it.

## 2025-07-10 NOTE — TELEPHONE ENCOUNTER
Patient returns call     Form was dropped off last week in an envelope, dropped off at the  with name was on the envelope per patient     It was dropped off 6/28/2025    If we don't have this form, please call and request another drop off     Tcs do we have any documentation of this or know where the form would be?

## 2025-07-10 NOTE — TELEPHONE ENCOUNTER
Does he need a particular form filled out or just a letter allowing him to return to work?    Carrington Rowe DO  7/10/2025 2:05 PM

## 2025-07-10 NOTE — TELEPHONE ENCOUNTER
Lm to come pu if this is the right dates. Incapacitated 5/22/25 - 8/1/25 are here.  I gave them back to Sarah.

## 2025-07-10 NOTE — TELEPHONE ENCOUNTER
I don't have any particular form. Does he have a copy of it?    Carrington Rowe DO  7/10/2025 2:06 PM

## 2025-07-15 ENCOUNTER — TRANSFERRED RECORDS (OUTPATIENT)
Dept: HEALTH INFORMATION MANAGEMENT | Facility: CLINIC | Age: 55
End: 2025-07-15
Payer: COMMERCIAL

## 2025-08-13 SDOH — HEALTH STABILITY: PHYSICAL HEALTH: ON AVERAGE, HOW MANY DAYS PER WEEK DO YOU ENGAGE IN MODERATE TO STRENUOUS EXERCISE (LIKE A BRISK WALK)?: 0 DAYS

## 2025-08-13 SDOH — HEALTH STABILITY: PHYSICAL HEALTH: ON AVERAGE, HOW MANY MINUTES DO YOU ENGAGE IN EXERCISE AT THIS LEVEL?: 0 MIN

## 2025-08-13 ASSESSMENT — SOCIAL DETERMINANTS OF HEALTH (SDOH): HOW OFTEN DO YOU GET TOGETHER WITH FRIENDS OR RELATIVES?: MORE THAN THREE TIMES A WEEK

## 2025-08-14 ENCOUNTER — OFFICE VISIT (OUTPATIENT)
Dept: FAMILY MEDICINE | Facility: CLINIC | Age: 55
End: 2025-08-14
Payer: COMMERCIAL

## 2025-08-14 VITALS
DIASTOLIC BLOOD PRESSURE: 66 MMHG | BODY MASS INDEX: 44.52 KG/M2 | HEART RATE: 74 BPM | RESPIRATION RATE: 18 BRPM | SYSTOLIC BLOOD PRESSURE: 118 MMHG | OXYGEN SATURATION: 94 % | TEMPERATURE: 97.9 F | WEIGHT: 311 LBS | HEIGHT: 70 IN

## 2025-08-14 DIAGNOSIS — R40.0 DAYTIME SLEEPINESS: ICD-10-CM

## 2025-08-14 DIAGNOSIS — Z12.11 SCREENING FOR COLON CANCER: ICD-10-CM

## 2025-08-14 DIAGNOSIS — E78.5 HYPERLIPIDEMIA WITH TARGET LDL LESS THAN 130: ICD-10-CM

## 2025-08-14 DIAGNOSIS — E87.1 HYPONATREMIA: ICD-10-CM

## 2025-08-14 DIAGNOSIS — D64.9 ANEMIA, UNSPECIFIED TYPE: ICD-10-CM

## 2025-08-14 DIAGNOSIS — K70.30 ALCOHOLIC CIRRHOSIS, UNSPECIFIED WHETHER ASCITES PRESENT (H): ICD-10-CM

## 2025-08-14 DIAGNOSIS — Z23 NEED FOR HEPATITIS A VACCINATION: ICD-10-CM

## 2025-08-14 DIAGNOSIS — Z13.1 SCREENING FOR DIABETES MELLITUS: ICD-10-CM

## 2025-08-14 DIAGNOSIS — R73.09 ELEVATED GLUCOSE: ICD-10-CM

## 2025-08-14 DIAGNOSIS — Z12.5 SCREENING FOR PROSTATE CANCER: ICD-10-CM

## 2025-08-14 DIAGNOSIS — Z00.00 ROUTINE GENERAL MEDICAL EXAMINATION AT A HEALTH CARE FACILITY: Primary | ICD-10-CM

## 2025-08-14 DIAGNOSIS — G47.00 INSOMNIA, UNSPECIFIED TYPE: ICD-10-CM

## 2025-08-14 DIAGNOSIS — R06.83 SNORING: ICD-10-CM

## 2025-08-14 LAB
AFP SERPL-MCNC: 2.5 NG/ML
ALBUMIN SERPL BCG-MCNC: 3.7 G/DL (ref 3.5–5.2)
ALP SERPL-CCNC: 55 U/L (ref 40–150)
ALT SERPL W P-5'-P-CCNC: 11 U/L (ref 0–70)
ANION GAP SERPL CALCULATED.3IONS-SCNC: 12 MMOL/L (ref 7–15)
AST SERPL W P-5'-P-CCNC: 28 U/L (ref 0–45)
BILIRUB SERPL-MCNC: 1.6 MG/DL
BUN SERPL-MCNC: 7.7 MG/DL (ref 6–20)
CALCIUM SERPL-MCNC: 9.8 MG/DL (ref 8.8–10.4)
CHLORIDE SERPL-SCNC: 107 MMOL/L (ref 98–107)
CHOLEST SERPL-MCNC: 161 MG/DL
CREAT SERPL-MCNC: 0.68 MG/DL (ref 0.67–1.17)
CRP SERPL-MCNC: 4.96 MG/L
EGFRCR SERPLBLD CKD-EPI 2021: >90 ML/MIN/1.73M2
ERYTHROCYTE [DISTWIDTH] IN BLOOD BY AUTOMATED COUNT: 14.5 % (ref 10–15)
EST. AVERAGE GLUCOSE BLD GHB EST-MCNC: 105 MG/DL
FASTING STATUS PATIENT QL REPORTED: YES
FASTING STATUS PATIENT QL REPORTED: YES
FERRITIN SERPL-MCNC: 206 NG/ML (ref 31–409)
FOLATE SERPL-MCNC: 29 NG/ML (ref 4.6–34.8)
GLUCOSE SERPL-MCNC: 91 MG/DL (ref 70–99)
HBA1C MFR BLD: 5.3 % (ref 0–5.6)
HCO3 SERPL-SCNC: 23 MMOL/L (ref 22–29)
HCT VFR BLD AUTO: 34.5 % (ref 40–53)
HDLC SERPL-MCNC: 42 MG/DL
HGB BLD-MCNC: 11.5 G/DL (ref 13.3–17.7)
INR PPP: 1.27 (ref 0.85–1.15)
IRON BINDING CAPACITY (ROCHE): 263 UG/DL (ref 240–430)
IRON SATN MFR SERPL: 28 % (ref 15–46)
IRON SERPL-MCNC: 74 UG/DL (ref 61–157)
LDLC SERPL CALC-MCNC: 102 MG/DL
MCH RBC QN AUTO: 30.2 PG (ref 26.5–33)
MCHC RBC AUTO-ENTMCNC: 33.3 G/DL (ref 31.5–36.5)
MCV RBC AUTO: 90.6 FL (ref 78–100)
NONHDLC SERPL-MCNC: 119 MG/DL
PLATELET # BLD AUTO: 137 10E3/UL (ref 150–450)
POTASSIUM SERPL-SCNC: 4.1 MMOL/L (ref 3.4–5.3)
PROT SERPL-MCNC: 6.7 G/DL (ref 6.4–8.3)
PROTHROMBIN TIME: 16.3 SECONDS (ref 11.8–14.8)
PSA SERPL DL<=0.01 NG/ML-MCNC: 0.26 NG/ML (ref 0–3.5)
RBC # BLD AUTO: 3.81 10E6/UL (ref 4.4–5.9)
SODIUM SERPL-SCNC: 142 MMOL/L (ref 135–145)
TRANSFERRIN SERPL-MCNC: 228 MG/DL (ref 200–360)
TRIGL SERPL-MCNC: 86 MG/DL
VIT B12 SERPL-MCNC: 512 PG/ML (ref 232–1245)
WBC # BLD AUTO: 4.23 10E3/UL (ref 4–11)

## 2025-08-14 PROCEDURE — 80053 COMPREHEN METABOLIC PANEL: CPT | Performed by: FAMILY MEDICINE

## 2025-08-14 PROCEDURE — 85610 PROTHROMBIN TIME: CPT | Performed by: FAMILY MEDICINE

## 2025-08-14 PROCEDURE — 90632 HEPA VACCINE ADULT IM: CPT | Performed by: FAMILY MEDICINE

## 2025-08-14 PROCEDURE — 82728 ASSAY OF FERRITIN: CPT | Performed by: FAMILY MEDICINE

## 2025-08-14 PROCEDURE — 85027 COMPLETE CBC AUTOMATED: CPT | Performed by: FAMILY MEDICINE

## 2025-08-14 PROCEDURE — 3078F DIAST BP <80 MM HG: CPT | Performed by: FAMILY MEDICINE

## 2025-08-14 PROCEDURE — 82105 ALPHA-FETOPROTEIN SERUM: CPT | Performed by: FAMILY MEDICINE

## 2025-08-14 PROCEDURE — 80061 LIPID PANEL: CPT | Performed by: FAMILY MEDICINE

## 2025-08-14 PROCEDURE — 1126F AMNT PAIN NOTED NONE PRSNT: CPT | Performed by: FAMILY MEDICINE

## 2025-08-14 PROCEDURE — G0103 PSA SCREENING: HCPCS | Performed by: FAMILY MEDICINE

## 2025-08-14 PROCEDURE — 82607 VITAMIN B-12: CPT | Performed by: FAMILY MEDICINE

## 2025-08-14 PROCEDURE — 99396 PREV VISIT EST AGE 40-64: CPT | Mod: 25 | Performed by: FAMILY MEDICINE

## 2025-08-14 PROCEDURE — 82746 ASSAY OF FOLIC ACID SERUM: CPT | Performed by: FAMILY MEDICINE

## 2025-08-14 PROCEDURE — 99213 OFFICE O/P EST LOW 20 MIN: CPT | Mod: 25 | Performed by: FAMILY MEDICINE

## 2025-08-14 PROCEDURE — G2211 COMPLEX E/M VISIT ADD ON: HCPCS | Performed by: FAMILY MEDICINE

## 2025-08-14 PROCEDURE — 83540 ASSAY OF IRON: CPT | Performed by: FAMILY MEDICINE

## 2025-08-14 PROCEDURE — 3074F SYST BP LT 130 MM HG: CPT | Performed by: FAMILY MEDICINE

## 2025-08-14 PROCEDURE — 84466 ASSAY OF TRANSFERRIN: CPT | Performed by: FAMILY MEDICINE

## 2025-08-14 PROCEDURE — 86140 C-REACTIVE PROTEIN: CPT | Performed by: FAMILY MEDICINE

## 2025-08-14 PROCEDURE — 36415 COLL VENOUS BLD VENIPUNCTURE: CPT | Performed by: FAMILY MEDICINE

## 2025-08-14 PROCEDURE — 90471 IMMUNIZATION ADMIN: CPT | Performed by: FAMILY MEDICINE

## 2025-08-14 PROCEDURE — 83036 HEMOGLOBIN GLYCOSYLATED A1C: CPT | Performed by: FAMILY MEDICINE

## 2025-08-14 RX ORDER — TRAZODONE HYDROCHLORIDE 100 MG/1
100 TABLET ORAL AT BEDTIME
Qty: 90 TABLET | Refills: 1 | Status: SHIPPED | OUTPATIENT
Start: 2025-08-14

## 2025-08-14 ASSESSMENT — PAIN SCALES - GENERAL: PAINLEVEL_OUTOF10: NO PAIN (0)

## 2025-08-18 ENCOUNTER — PATIENT OUTREACH (OUTPATIENT)
Dept: CARE COORDINATION | Facility: CLINIC | Age: 55
End: 2025-08-18
Payer: COMMERCIAL